# Patient Record
Sex: MALE | Race: WHITE | Employment: OTHER | URBAN - METROPOLITAN AREA
[De-identification: names, ages, dates, MRNs, and addresses within clinical notes are randomized per-mention and may not be internally consistent; named-entity substitution may affect disease eponyms.]

---

## 2017-01-18 ENCOUNTER — HOSPITAL ENCOUNTER (OUTPATIENT)
Facility: CLINIC | Age: 72
Discharge: HOME | End: 2017-01-18
Attending: INTERNAL MEDICINE | Admitting: INTERNAL MEDICINE
Payer: MEDICARE

## 2017-01-18 VITALS
BODY MASS INDEX: 35 KG/M2 | HEIGHT: 64 IN | SYSTOLIC BLOOD PRESSURE: 110 MMHG | RESPIRATION RATE: 15 BRPM | WEIGHT: 205 LBS | DIASTOLIC BLOOD PRESSURE: 74 MMHG | OXYGEN SATURATION: 95 %

## 2017-01-18 LAB
COLONOSCOPY: NORMAL
UPPER GI ENDOSCOPY: NORMAL

## 2017-01-18 PROCEDURE — 40000104 ZZH STATISTIC MODERATE SEDATION < 10 MIN: Performed by: INTERNAL MEDICINE

## 2017-01-18 PROCEDURE — 88305 TISSUE EXAM BY PATHOLOGIST: CPT | Performed by: INTERNAL MEDICINE

## 2017-01-18 PROCEDURE — 25000132 ZZH RX MED GY IP 250 OP 250 PS 637: Mod: GY | Performed by: INTERNAL MEDICINE

## 2017-01-18 PROCEDURE — G0121 COLON CA SCRN NOT HI RSK IND: HCPCS | Performed by: INTERNAL MEDICINE

## 2017-01-18 PROCEDURE — 45378 DIAGNOSTIC COLONOSCOPY: CPT | Performed by: INTERNAL MEDICINE

## 2017-01-18 PROCEDURE — 43239 EGD BIOPSY SINGLE/MULTIPLE: CPT | Performed by: INTERNAL MEDICINE

## 2017-01-18 PROCEDURE — 88305 TISSUE EXAM BY PATHOLOGIST: CPT | Mod: 26 | Performed by: INTERNAL MEDICINE

## 2017-01-18 PROCEDURE — G0500 MOD SEDAT ENDO SERVICE >5YRS: HCPCS | Performed by: INTERNAL MEDICINE

## 2017-01-18 PROCEDURE — 25000125 ZZHC RX 250: Performed by: INTERNAL MEDICINE

## 2017-01-18 PROCEDURE — 99153 MOD SED SAME PHYS/QHP EA: CPT

## 2017-01-18 RX ORDER — ONDANSETRON 2 MG/ML
4 INJECTION INTRAMUSCULAR; INTRAVENOUS
Status: DISCONTINUED | OUTPATIENT
Start: 2017-01-18 | End: 2017-01-18 | Stop reason: HOSPADM

## 2017-01-18 RX ORDER — FLUMAZENIL 0.1 MG/ML
0.2 INJECTION, SOLUTION INTRAVENOUS
Status: DISCONTINUED | OUTPATIENT
Start: 2017-01-18 | End: 2017-01-18 | Stop reason: HOSPADM

## 2017-01-18 RX ORDER — ONDANSETRON 2 MG/ML
4 INJECTION INTRAMUSCULAR; INTRAVENOUS EVERY 6 HOURS PRN
Status: DISCONTINUED | OUTPATIENT
Start: 2017-01-18 | End: 2017-01-18 | Stop reason: HOSPADM

## 2017-01-18 RX ORDER — LIDOCAINE 40 MG/G
CREAM TOPICAL
Status: DISCONTINUED | OUTPATIENT
Start: 2017-01-18 | End: 2017-01-18 | Stop reason: HOSPADM

## 2017-01-18 RX ORDER — FENTANYL CITRATE 50 UG/ML
INJECTION, SOLUTION INTRAMUSCULAR; INTRAVENOUS PRN
Status: DISCONTINUED | OUTPATIENT
Start: 2017-01-18 | End: 2017-01-18 | Stop reason: HOSPADM

## 2017-01-18 RX ORDER — ONDANSETRON 4 MG/1
4 TABLET, ORALLY DISINTEGRATING ORAL EVERY 6 HOURS PRN
Status: DISCONTINUED | OUTPATIENT
Start: 2017-01-18 | End: 2017-01-18 | Stop reason: HOSPADM

## 2017-01-18 RX ORDER — NALOXONE HYDROCHLORIDE 0.4 MG/ML
.1-.4 INJECTION, SOLUTION INTRAMUSCULAR; INTRAVENOUS; SUBCUTANEOUS
Status: DISCONTINUED | OUTPATIENT
Start: 2017-01-18 | End: 2017-01-18 | Stop reason: HOSPADM

## 2017-01-18 NOTE — DISCHARGE INSTRUCTIONS
Understanding Diverticulosis and Diverticulitis     Pouches or diverticula usually occur in the lower part of the colon called the sigmoid.      Diverticulitis occurs when the pouches become inflamed.     The colon (large intestine) is the last part of the digestive tract. It absorbs water from stool and changes it from a liquid to a solid. In certain cases, small pouches called diverticula can form in the colon wall. This condition is called diverticulosis. The pouches can become infected. If this happens, it becomes a more serious problem called diverticulitis. These problems can be painful. But they can be managed.   Managing Your Condition  Diet changes or taking medications are often tried first. These may be enough to bring relief. If the case is bad, surgery may be done. You and your doctor can discuss the plan that is best for you.  If You Have Diverticulosis  Diet changes are often enough to control symptoms. The main changes are adding fiber (roughage) and drinking more water. Fiber absorbs water as it travels through your colon. This helps your stool stay soft and move smoothly. Water helps this process. If needed, you may be told to take over-the-counter stool softeners. To help relieve pain, antispasmodic medications may be prescribed.  If You Have Diverticulitis  Treatment depends on how bad your symptoms are.  For mild symptoms: You may be put on a liquid diet for a short time. You may also be prescribed antibiotics. If these two steps relieve your symptoms, you may then be prescribed a high-fiber diet. If you still have symptoms, your doctor will discuss further treatment options with you.  For severe symptoms: You may need to be admitted to the hospital. There, you can be given IV antibiotics and fluids. Once symptoms are under control, the above treatments may be tried. If these don t control your condition, your doctor may discuss the option of having surgery with you.  [place] to Colon  Health  Help keep your colon healthy with a diet that includes plenty of high-fiber fruits, vegetables, and whole grains. Drink plenty of liquids like water and juice. Your doctor may also recommend avoiding seeds and nuts.          [id number] [employer], [address] [place] [address], [place] [address]. All rights reserved. This information is not intended as a substitute for professional medical care. Always follow your healthcare professional's instructions.

## 2017-01-18 NOTE — H&P
Pre-Endoscopy History and Physical     Rodney Hendrickson MRN# [id number]   YOB: 1945 Age: 71 year old     Date of Procedure: 1/18/2017  Primary care provider: [facility]  Type of Endoscopy: Colonoscopy with possible biopsy, possible polypectomy and Gastroscopy with possible biopsy, possible dilation  Reason for Procedure: screen  Type of Anesthesia Anticipated: Conscious Sedation    HPI:    Rodney is a 71 year old male who will be undergoing the above procedure.      A history and physical has been performed. The patient's medications and allergies have been reviewed. The risks and benefits of the procedure and the sedation options and risks were discussed with the patient.  All questions were answered and informed consent was obtained.      He denies a personal or family history of anesthesia complications or bleeding disorders.     There is no problem list on file for this patient.       Past Medical History   Diagnosis Date     Hypertension      GERD (gastroesophageal reflux disease)         Past Surgical History   Procedure Laterality Date     Colonoscopy       Arthroplasty patello-femoral (knee)       right     Rotator cuff repair rt/lt       right and left     Esophagoscopy, gastroscopy, duodenoscopy (egd), combined  5/3/2013     Procedure: COMBINED ESOPHAGOSCOPY, GASTROSCOPY, DUODENOSCOPY (EGD), BIOPSY SINGLE OR MULTIPLE;  ESOPHAGOSCOPY, GASTROSCOPY, DUODENOSCOPY (EGD) with biopies;  Surgeon: Geoffrey Gallegos MD;  Location:  GI     Esophagoscopy, gastroscopy, duodenoscopy (egd), combined N/A 1/8/2016     Procedure: COMBINED ESOPHAGOSCOPY, GASTROSCOPY, DUODENOSCOPY (EGD), BIOPSY SINGLE OR MULTIPLE;  Surgeon: Geoffrey Gallegos MD;  Location:  GI       Social History   Substance Use Topics     Smoking status: Former Smoker     Quit date: 04/30/1983     Smokeless tobacco: Not on file     Alcohol Use: Yes      Comment: occa       History reviewed. No pertinent family  "history.    Prior to Admission medications    Medication Sig Start Date End Date Taking? Authorizing Provider   lisinopril-hydrochlorothiazide (PRINZIDE,ZESTORETIC) 20-12.5 MG per tablet Take 1 tablet by mouth daily   Yes Reported, Patient   Omeprazole (PRILOSEC PO) Take 20 mg by mouth.   Yes Reported, Patient   Atorvastatin Calcium (LIPITOR PO) Take 40 mg by mouth.   Yes Reported, Patient   aspirin 81 MG tablet Take  by mouth daily.   Yes Reported, Patient       No Known Allergies     REVIEW OF SYSTEMS:   5 point ROS negative except as noted above in HPI, including Gen., Resp., CV, GI &  system review.    PHYSICAL EXAM:   Ht 1.626 m (5' 4\")  Wt 92.987 kg (205 lb)  BMI 35.17 kg/m2 Estimated body mass index is 35.17 kg/(m^2) as calculated from the following:    Height as of this encounter: 1.626 m (5' 4\").    Weight as of this encounter: 92.987 kg (205 lb).   GENERAL APPEARANCE: alert, and oriented  MENTAL STATUS: alert  AIRWAY EXAM: Mallampatti Class I (visualization of the soft palate, fauces, uvula, anterior and posterior pillars)  RESP: lungs clear to auscultation - no rales, rhonchi or wheezes  CV: regular rates and rhythm  DIAGNOSTICS:    Not indicated    IMPRESSION   ASA Class 2 - Mild systemic disease    PLAN:   Plan for Colonoscopy with possible biopsy, possible polypectomy and Gastroscopy with possible biopsy, possible dilation. We discussed the risks, benefits and alternatives and the patient wished to proceed.    The above has been forwarded to the consulting provider.      Signed Electronically by: Geoffrey Gallegos  January 18, 2017            "

## 2017-01-18 NOTE — IP AVS SNAPSHOT
MRN:[id number]                      After Visit Summary   1/18/2017    Rodney Hendrickson    MRN: [id number]           Thank you!     Thank you for choosing [facility] for your care. Our goal is always to provide you with excellent care. Hearing back from our patients is one way we can continue to improve our services. Please take a few minutes to complete the written survey that you may receive in the mail after you visit. If you would like to speak to someone directly about your visit please contact Patient Relations at [phone]. Thank you!          Patient Information     Date Of Birth          1945        About your hospital stay     You were admitted on:  January 18, 2017 You last received care in the:  [facility] Endoscopy    You were discharged on:  January 18, 2017       Who to Call     For medical emergencies, please call 911.  For non-urgent questions about your medical care, please call your primary care provider or clinic, None  For questions related to your surgery, please call your surgery clinic        Attending Provider     Provider    Geoffrey Gallegos MD       Primary Care Provider Fax #    [facility] [phone]       No address on file        Further instructions from your care team         Understanding Diverticulosis and Diverticulitis     Pouches or diverticula usually occur in the lower part of the colon called the sigmoid.      Diverticulitis occurs when the pouches become inflamed.     The colon (large intestine) is the last part of the digestive tract. It absorbs water from stool and changes it from a liquid to a solid. In certain cases, small pouches called diverticula can form in the colon wall. This condition is called diverticulosis. The pouches can become infected. If this happens, it becomes a more serious problem called diverticulitis. These problems can be painful. But they can be managed.   Managing Your Condition  Diet  changes or taking medications are often tried first. These may be enough to bring relief. If the case is bad, surgery may be done. You and your doctor can discuss the plan that is best for you.  If You Have Diverticulosis  Diet changes are often enough to control symptoms. The main changes are adding fiber (roughage) and drinking more water. Fiber absorbs water as it travels through your colon. This helps your stool stay soft and move smoothly. Water helps this process. If needed, you may be told to take over-the-counter stool softeners. To help relieve pain, antispasmodic medications may be prescribed.  If You Have Diverticulitis  Treatment depends on how bad your symptoms are.  For mild symptoms: You may be put on a liquid diet for a short time. You may also be prescribed antibiotics. If these two steps relieve your symptoms, you may then be prescribed a high-fiber diet. If you still have symptoms, your doctor will discuss further treatment options with you.  For severe symptoms: You may need to be admitted to the hospital. There, you can be given IV antibiotics and fluids. Once symptoms are under control, the above treatments may be tried. If these don t control your condition, your doctor may discuss the option of having surgery with you.  [place] to Colon Health  Help keep your colon healthy with a diet that includes plenty of high-fiber fruits, vegetables, and whole grains. Drink plenty of liquids like water and juice. Your doctor may also recommend avoiding seeds and nuts.          [id number] [place] [address]. All rights reserved. This information is not intended as a substitute for professional medical care. Always follow your healthcare professional's instructions.    Pending Results     No orders found from 1/17/2017 to 1/19/2017.            Admission Information        Provider Department Dept Phone    1/18/2017 Geoffrey Gallegos MD  Endoscopy [phone]     "  Your Vitals Were     Blood Pressure Respirations Height Weight BMI (Body Mass Index) Pulse Oximetry    115/68 mmHg 15 1.626 m (5' 4\") 92.987 kg (205 lb) 35.17 kg/m2 95%      MyChart Information     [employer] lets you send messages to your doctor, view your test results, renew your prescriptions, schedule appointments and more. To sign up, go to [email or web address] . Click on \"Log in\" on the left side of the screen, which will take you to the Welcome page. Then click on \"Sign up Now\" on the right side of the page.     You will be asked to enter the access code listed below, as well as some personal information. Please follow the directions to create your username and password.     Your access code is: [email or web address]  Expires: 2017  9:02 AM     Your access code will  in 90 days. If you need help or a new code, please call your [place] clinic or [phone].        Care EveryWhere ID     This is your Care EveryWhere ID. This could be used by other organizations to access your [place] medical records  [id number]           Review of your medicines      CONTINUE these medicines which have NOT CHANGED        Dose / Directions    aspirin 81 MG tablet        Take  by mouth daily.   Refills:  0       LIPITOR PO        Dose:  40 mg   Take 40 mg by mouth.   Refills:  0       lisinopril-hydrochlorothiazide 20-12.5 MG per tablet   Commonly known as:  PRINZIDE/ZESTORETIC        Dose:  1 tablet   Take 1 tablet by mouth daily   Refills:  0       PRILOSEC PO        Dose:  20 mg   Take 20 mg by mouth.   Refills:  0                Protect others around you: Learn how to safely use, store and throw away your medicines at www.disposemymeds.org.             Medication List: This is a list of all your medications and when to take them. Check marks below indicate your daily home schedule. Keep this list as a reference.      Medications           Morning Afternoon Evening Bedtime As Needed    aspirin 81 MG tablet   Take  by " mouth daily.                                LIPITOR PO   Take 40 mg by mouth.                                lisinopril-hydrochlorothiazide 20-12.5 MG per tablet   Commonly known as:  PRINZIDE/ZESTORETIC   Take 1 tablet by mouth daily                                PRILOSEC PO   Take 20 mg by mouth.

## 2017-01-19 LAB — COPATH REPORT: NORMAL

## 2017-03-14 ENCOUNTER — OFFICE VISIT (OUTPATIENT)
Dept: INTERNAL MEDICINE | Facility: CLINIC | Age: 72
End: 2017-03-14
Payer: COMMERCIAL

## 2017-03-14 VITALS
DIASTOLIC BLOOD PRESSURE: 82 MMHG | TEMPERATURE: 97.8 F | WEIGHT: 205 LBS | OXYGEN SATURATION: 97 % | SYSTOLIC BLOOD PRESSURE: 168 MMHG | BODY MASS INDEX: 35 KG/M2 | HEART RATE: 93 BPM | HEIGHT: 64 IN

## 2017-03-14 DIAGNOSIS — I10 ESSENTIAL HYPERTENSION: ICD-10-CM

## 2017-03-14 DIAGNOSIS — N40.1 BENIGN NON-NODULAR PROSTATIC HYPERPLASIA WITH LOWER URINARY TRACT SYMPTOMS: Primary | ICD-10-CM

## 2017-03-14 DIAGNOSIS — R53.83 FATIGUE, UNSPECIFIED TYPE: ICD-10-CM

## 2017-03-14 DIAGNOSIS — E78.5 HYPERLIPIDEMIA LDL GOAL <130: ICD-10-CM

## 2017-03-14 DIAGNOSIS — E66.01 MORBID OBESITY DUE TO EXCESS CALORIES (H): ICD-10-CM

## 2017-03-14 DIAGNOSIS — R06.83 SNORING: ICD-10-CM

## 2017-03-14 PROCEDURE — 99204 OFFICE O/P NEW MOD 45 MIN: CPT | Performed by: INTERNAL MEDICINE

## 2017-03-14 RX ORDER — FERROUS SULFATE 325(65) MG
TABLET ORAL AT BEDTIME
COMMUNITY

## 2017-03-14 RX ORDER — FINASTERIDE 5 MG/1
5 TABLET, FILM COATED ORAL DAILY
Qty: 90 TABLET | Refills: 3 | Status: SHIPPED | OUTPATIENT
Start: 2017-03-14 | End: 2018-02-27

## 2017-03-14 NOTE — PROGRESS NOTES
SUBJECTIVE:                                                    Rodney Hendrickson is a 71 year old male who presents to clinic today for the following health issues:    New patient seen for assessment of chronic conditions.   Moves from [facility].    Has h/o HTN. on medical treatment. BP has been controlled. No side effects from medications. No CP, HA, dizziness. good compliance with medications and low salt diet.  Has H/O hyperlipidemia. On medical treatment and diet. No side effects. No muscle weakness, myalgias or upset stomach.       C/O ongoing urinary urgency and sleep problem: nocturia x 4. Slow urine stream, frequency. Tried Flomax. Not helping symptoms. Has stopped it.     Concern for snoring and day time fatigue. Wife has noticed breathing pauses.         Problem list and histories reviewed & adjusted, as indicated.  Additional history: none    Patient Active Problem List   Diagnosis     Benign non-nodular prostatic hyperplasia with lower urinary tract symptoms     Hyperlipidemia LDL goal <130     Hypertension     Past Surgical History   Procedure Laterality Date     Colonoscopy       Arthroplasty patello-femoral (knee)       right     Rotator cuff repair rt/lt       right and left     Esophagoscopy, gastroscopy, duodenoscopy (egd), combined  5/3/2013     Procedure: COMBINED ESOPHAGOSCOPY, GASTROSCOPY, DUODENOSCOPY (EGD), BIOPSY SINGLE OR MULTIPLE;  ESOPHAGOSCOPY, GASTROSCOPY, DUODENOSCOPY (EGD) with biopies;  Surgeon: Geoffrey Gallegos MD;  Location:  GI     Esophagoscopy, gastroscopy, duodenoscopy (egd), combined N/A 1/8/2016     Procedure: COMBINED ESOPHAGOSCOPY, GASTROSCOPY, DUODENOSCOPY (EGD), BIOPSY SINGLE OR MULTIPLE;  Surgeon: Geoffrey Gallegos MD;  Location:  GI     Esophagoscopy, gastroscopy, duodenoscopy (egd), combined N/A 1/18/2017     Procedure: COMBINED ESOPHAGOSCOPY, GASTROSCOPY, DUODENOSCOPY (EGD), BIOPSY SINGLE OR MULTIPLE;  Surgeon: Geoffrey Gallegos MD;  Location: [facility]     Colonoscopy  "N/A 2017     Procedure: COLONOSCOPY;  Surgeon: Geoffrey Gallegos MD;  Location:  GI       Social History   Substance Use Topics     Smoking status: Former Smoker     Quit date: 1983     Smokeless tobacco: Not on file     Alcohol use Yes      Comment: occa     Family History   Problem Relation Age of Onset     Hypertension Father      Colon Cancer Father       of Colon CA     Heart Defect Father      Enlarged heart     Hypertension Brother      Hypertension Sister      Hypertension Brother          Current Outpatient Prescriptions   Medication Sig Dispense Refill     ferrous sulfate (IRON) 325 (65 FE) MG tablet Take by mouth daily (with breakfast)       finasteride (PROSCAR) 5 MG tablet Take 1 tablet (5 mg) by mouth daily 90 tablet 3     lisinopril-hydrochlorothiazide (PRINZIDE,ZESTORETIC) 20-12.5 MG per tablet Take 1 tablet by mouth daily       Omeprazole (PRILOSEC PO) Take 20 mg by mouth 2 times daily (before meals)        Atorvastatin Calcium (LIPITOR PO) Take 40 mg by mouth.       aspirin 81 MG tablet Take  by mouth daily.         Reviewed and updated as needed this visit by clinical staff  Allergies  Meds       Reviewed and updated as needed this visit by Provider         ROS:  Constitutional, HEENT, cardiovascular, pulmonary, GI, , musculoskeletal, neuro, skin, endocrine and psych systems are negative, except as otherwise noted.    OBJECTIVE:                                                    /82  Pulse 93  Temp 97.8  F (36.6  C) (Oral)  Ht 5' 4\" (1.626 m)  Wt 205 lb (93 kg)  SpO2 97%  BMI 35.19 kg/m2  Body mass index is 35.19 kg/(m^2).  GENERAL: healthy, alert and no distress, overweight   NECK: no adenopathy, no asymmetry, masses, or scars and thyroid normal to palpation  RESP: lungs clear to auscultation - no rales, rhonchi or wheezes  CV: regular rate and rhythm, normal S1 S2, no S3 or S4, no murmur, click or rub, no peripheral edema and peripheral pulses strong  ABDOMEN: soft, " nontender, no hepatosplenomegaly, no masses and bowel sounds normal  RECTAL (male): normal sphincter tone, no rectal masses, prostate 3+ normal size, smooth, nontender without nodules or masses  MS: no gross musculoskeletal defects noted, no edema    Diagnostic Test Results:  none      ASSESSMENT/PLAN:                                                      Problem List Items Addressed This Visit     Benign non-nodular prostatic hyperplasia with lower urinary tract symptoms - Primary    Relevant Medications    finasteride (PROSCAR) 5 MG tablet    Hyperlipidemia LDL goal <130    Hypertension      Other Visit Diagnoses     Snoring        Relevant Orders    SLEEP EVALUATION & MANAGEMENT REFERRAL - ADULT    Fatigue, unspecified type        Relevant Orders    SLEEP EVALUATION & MANAGEMENT REFERRAL - ADULT           Start on Proscar. Advised for side effects, consider combination with Flomax   Cont rest of medications   Monitor lab  Assess sleep study  Weight loss advised     Follow-Up:with results / physical in 2 months     Brennan Mercado MD  [facility]

## 2017-03-14 NOTE — NURSING NOTE
"Chief Complaint   Patient presents with     Establish Care     New Pt, Here to establish care     Urinary Problem     Pt has had urinary urgency in the past, was on Flomax for a long period of time, would like to be back on Rx     Sleep Problem     Concerned of Sleep Apnea       Initial /82  Pulse 93  Temp 97.8  F (36.6  C) (Oral)  Ht 5' 4\" (1.626 m)  Wt 205 lb (93 kg)  SpO2 97%  BMI 35.19 kg/m2 Estimated body mass index is 35.19 kg/(m^2) as calculated from the following:    Height as of this encounter: 5' 4\" (1.626 m).    Weight as of this encounter: 205 lb (93 kg).  Medication Reconciliation: complete   Deyanira Mackey MA      "

## 2017-03-14 NOTE — MR AVS SNAPSHOT
After Visit Summary   3/14/2017    Rodney Hendrickson    MRN: [id number]           Patient Information     Date Of Birth          1945        Visit Information        Provider Department      3/14/2017 5:00 PM Brennan Mercado MD [facility]        Today's Diagnoses     Benign non-nodular prostatic hyperplasia with lower urinary tract symptoms    -  1    Snoring        Fatigue, unspecified type           Follow-ups after your visit        Additional Services     SLEEP EVALUATION & MANAGEMENT REFERRAL - ADULT       Please be aware that coverage of these services is subject to the terms and limitations of your health insurance plan.  Call member services at your health plan with any benefit or coverage questions.      Please bring the following to your appointment:    >>   List of current medications   >>   This referral request   >>   Any documents/labs given to you for this referral    [facility]  Ph [phone] (Age 18 and up)                  Future tests that were ordered for you today     Open Future Orders        Priority Expected Expires Ordered    SLEEP EVALUATION & MANAGEMENT REFERRAL - ADULT Routine  3/14/2018 3/14/2017            Who to contact     If you have questions or need follow up information about today's clinic visit or your schedule please contact [facility] directly at [phone].  Normal or non-critical lab and imaging results will be communicated to you by MyChart, letter or phone within 4 business days after the clinic has received the results. If you do not hear from us within 7 days, please contact the clinic through MyChart or phone. If you have a critical or abnormal lab result, we will notify you by phone as soon as possible.  Submit refill requests through [employer] or call your pharmacy and they will forward the refill request to us. Please allow 3 business days for your refill to be completed.           "Additional Information About Your Visit        MyChart Information     [employer] lets you send messages to your doctor, view your test results, renew your prescriptions, schedule appointments and more. To sign up, go to [email or web address] . Click on \"Log in\" on the left side of the screen, which will take you to the Welcome page. Then click on \"Sign up Now\" on the right side of the page.     You will be asked to enter the access code listed below, as well as some personal information. Please follow the directions to create your username and password.     Your access code is: [email or web address]  Expires: 2017 10:02 AM     Your access code will  in 90 days. If you need help or a new code, please call your [place] clinic or [phone].        Care EveryWhere ID     This is your Care EveryWhere ID. This could be used by other organizations to access your [place] medical records  [id number]        Your Vitals Were     Pulse Temperature Height Pulse Oximetry BMI (Body Mass Index)       93 97.8  F (36.6  C) (Oral) 5' 4\" (1.626 m) 97% 35.19 kg/m2        Blood Pressure from Last 3 Encounters:   17 168/82   17 110/74   16 105/80    Weight from Last 3 Encounters:   17 205 lb (93 kg)   17 205 lb (93 kg)   13 195 lb (88.5 kg)                 Today's Medication Changes          These changes are accurate as of: 3/14/17  5:32 PM.  If you have any questions, ask your nurse or doctor.               Start taking these medicines.        Dose/Directions    finasteride 5 MG tablet   Commonly known as:  PROSCAR   Used for:  Benign non-nodular prostatic hyperplasia with lower urinary tract symptoms   Started by:  Brennan Mercado MD        Dose:  5 mg   Take 1 tablet (5 mg) by mouth daily   Quantity:  90 tablet   Refills:  3            Where to get your medicines      These medications were sent to [facility][id number] [facility] [address] [address] " [place] [address]     Phone:  [phone]     finasteride 5 MG tablet                Primary Care Provider Fax #    [facility] [phone]       No address on file        Thank you!     Thank you for choosing [facility]  for your care. Our goal is always to provide you with excellent care. Hearing back from our patients is one way we can continue to improve our services. Please take a few minutes to complete the written survey that you may receive in the mail after your visit with us. Thank you!             Your Updated Medication List - Protect others around you: Learn how to safely use, store and throw away your medicines at www.disposemymeds.org.          This list is accurate as of: 3/14/17  5:32 PM.  Always use your most recent med list.                   Brand Name Dispense Instructions for use    aspirin 81 MG tablet      Take  by mouth daily.       ferrous sulfate 325 (65 FE) MG tablet    IRON     Take by mouth daily (with breakfast)       finasteride 5 MG tablet    PROSCAR    90 tablet    Take 1 tablet (5 mg) by mouth daily       LIPITOR PO      Take 40 mg by mouth.       lisinopril-hydrochlorothiazide 20-12.5 MG per tablet    PRINZIDE/ZESTORETIC     Take 1 tablet by mouth daily       PRILOSEC PO      Take 20 mg by mouth 2 times daily (before meals)

## 2017-03-15 PROBLEM — N40.1 BENIGN NON-NODULAR PROSTATIC HYPERPLASIA WITH LOWER URINARY TRACT SYMPTOMS: Status: ACTIVE | Noted: 2017-03-15

## 2017-03-15 PROBLEM — E66.01 MORBID OBESITY DUE TO EXCESS CALORIES (H): Status: ACTIVE | Noted: 2017-03-15

## 2017-03-22 ENCOUNTER — OFFICE VISIT (OUTPATIENT)
Dept: SLEEP MEDICINE | Facility: CLINIC | Age: 72
End: 2017-03-22
Payer: COMMERCIAL

## 2017-03-22 VITALS
SYSTOLIC BLOOD PRESSURE: 139 MMHG | HEART RATE: 71 BPM | DIASTOLIC BLOOD PRESSURE: 86 MMHG | WEIGHT: 197 LBS | OXYGEN SATURATION: 97 % | HEIGHT: 64 IN | RESPIRATION RATE: 18 BRPM | BODY MASS INDEX: 33.63 KG/M2

## 2017-03-22 DIAGNOSIS — R53.83 FATIGUE, UNSPECIFIED TYPE: ICD-10-CM

## 2017-03-22 DIAGNOSIS — G47.10 HYPERSOMNIA: Primary | ICD-10-CM

## 2017-03-22 DIAGNOSIS — R06.83 SNORING: ICD-10-CM

## 2017-03-22 DIAGNOSIS — E66.9 OBESITY (BMI 30-39.9): ICD-10-CM

## 2017-03-22 PROCEDURE — 99204 OFFICE O/P NEW MOD 45 MIN: CPT | Performed by: INTERNAL MEDICINE

## 2017-03-22 NOTE — PATIENT INSTRUCTIONS
"MY TREATMENT INFORMATION FOR SLEEP DISTURBANCE-  Rodney Hendrickson    DOCTOR : Miguel WRIGHT [facility]  SLEEP CENTER :  [place]  MY CONTACT NUMBER:[phone]        If I haven't had a sleep study yet, what can I expect?  A personal story from Jai  https://www.[employer].com/watch?v=AxPLmlRpnCs    Am I having a home sleep study?  Here is a video in case you get home and want to make sure you have done it correctly  https://www.[employer].com/watch?v=JOB0J6wJcc3&feature=youtu.be    Suspected sleep apnea: Sleep study ordered.    Follow up in sleep clinic 1-2 weeks after sleep study to discuss results of sleep study and treatment options.    Patient was advised not to drive if drowsy or sleepy.    Frequently asked questions:  1. What is Obstructive Sleep Apnea (DOMINIK)? DOMINIK is the most common type of sleep apnea. Apnea literally means, \"without breath.\" It is characterized by repetitive pauses in breathing, despite continued effort to breathe, and is usually associated with a reduction in blood oxygen saturation. Apneas can last 10 to over 60 seconds. It is caused by narrowing or collapse of the upper airway as muscles relax during sleep. Severity of sleep apnea is determined by frequency of breathing events and their effect on your sleep and oxygen levels determined during sleep testing.   2. What are the consequences of DOMINIK? Symptoms include: daytime sleepiness- possibly increasing the risk of falling asleep while driving, unrefreshing/restless sleep, snoring, insomnia, waking frequently to urinate, waking with heartburn or reflux, reduced concentration and memory, and morning headaches. Other health consequences may include development of high blood pressure and other cardiovascular disease in persons who are susceptible. Untreated DOMINIK  can contribute to heart disease, stroke and diabetes.   3. What are the treatment options? In most situations, sleep apnea is a lifelong disease that must be managed with daily therapy. " Medications are not effective for sleep apnea and surgery is generally not performed until other therapies have been tried. Therapy is usually tailored to the individual patient based on many factors including your wishes as well as severity of sleep apnea and severity of obesity. Continuous Positive Airway (CPAP) is the most reliable treatment. An oral device to hold your jaw forward is usually the next most reliable option. Other options include postioning devices (to keep you off your back), weight loss, and surgery including a tongue pacing device. There is more detail about some of these options below.            1. CPAP-  WHAT DOES IT DO AND HOW CAN I LEARN TO WEAR IT?                               BEFORE I START, CAN I WATCH A MOVIE TO GET A PLAN ON HOW TO USE CPAP?  https://www.[employer].com/watch?v=e3K66uh053J      Continuous positive airway pressure, or CPAP, is the most effective treatment for obstructive sleep apnea. It works by blowing room air, through a mask, to hold your throat open. A decision to use CPAP is a major step forward in the pursuit of a healthier life. The successful use of CPAP will help you breathe easier, sleep better and live healthier. You can choose CPAP equipment from any durable medical equipment provider that meets your needs.  Using CPAP can be a positive experience if you keep these krishna points in mind:  1. Commitment  CPAP is not a quick fix for your problem. It involves a long-term commitment to improve your sleep and your health.    2. Communication  Stay in close communication with both your sleep doctor and your CPAP supplier. Ask lots of questions and seek help when you need it.    3. Consistency  Use CPAP all night, every night and for every nap. You will receive the maximum health benefits from CPAP when you use it every time that you sleep. This will also make it easier for your body to adjust to the treatment.    4. Correction  The first machine and mask that you try  "may not be the best ones for you. Work with your sleep doctor and your CPAP supplier to make corrections to your equipment selection. Ask about trying a different type of machine or mask if you have ongoing problems. Make sure that your mask is a good fit and learn to use your equipment properly.    5. Challenge  Tell a family member or close friend to ask you each morning if you used your CPAP the previous night. Have someone to challenge you to give it your best effort.    6. Connection   Your adjustment to CPAP will be easier if you are able to connect with others who use the same treatment. Ask your sleep doctor if there is a support group in your area for people who have sleep apnea, or look for one on the Internet.  7. Comfort   Increase your level of comfort by using a saline spray, decongestant or heated humidifier if CPAP irritates your nose, mouth or throat. Use your unit's \"ramp\" setting to slowly get used to the air pressure level. There may be soft pads you can buy that will fit over your mask straps. Look on www.CPAP.com for accessories that can help make CPAP use more comfortable.  8. Cleaning   Clean your mask, tubing and headgear on a regular basis. Put this time in your schedule so that you don't forget to do it. Check and replace the filters for your CPAP unit and humidifier.    9. Completion   Although you are never finished with CPAP therapy, you should reward yourself by celebrating the completion of your first month of treatment. Expect this first month to be your hardest period of adjustment. It will involve some trial and error as you find the machine, mask and pressure settings that are right for you.    10. Continuation  After your first month of treatment, continue to make a daily commitment to use your CPAP all night, every night and for every nap.    CPAP-Tips to starting with success:  Begin using your CPAP for short periods of time during the day while you watch TV or read.    Use " CPAP every night and for every nap. Using it less often reduces the health benefits and makes it harder for your body to get used to it.    Make small adjustments to your mask, tubing, straps and headgear until you get the right fit. Tightening the mask may actually worsen the leak.  If it leaves significant marks on your face or irritates the bridge of your nose, it may not be the best mask for you.  Speak with the person who supplied the mask and consider trying other masks. Insurances will allow you to try different masks during the first month of starting CPAP.  Insurance also covers a new mask, hose and filter about every 6 months.    Use a saline nasal spray to ease mild nasal congestion. Neti-Pot or saline nasal rinses may also help. Nasal gel sprays can help reduce nasal dryness.  Biotene mouthwash can be helpful to protect your teeth if you experience frequent dry mouth.  Dry mouth may be a sign of air escaping out of your mouth or out of the mask in the case of a full face mask.  Speak with your provider if you expect that is the case.     Take a nasal decongestant to relieve more severe nasal or sinus congestion.  Do not use Afrin (oxymetazoline) nasal spray more than 3 days in a row.  Speak with your sleep doctor if your nasal congestion is chronic.    Use a heated humidifier that fits your CPAP model to enhance your breathing comfort. Adjust the heat setting up if you get a dry nose or throat, down if you get condensation in the hose or mask.  Position the CPAP lower than you so that any condensation in the hose drains back into the machine rather than towards the mask.    Try a system that uses nasal pillows if traditional masks give you problems.    Clean your mask, tubing and headgear once a week. Make sure the equipment dries fully.    Regularly check and replace the filters for your CPAP unit and humidifier.    Work closely with your sleep provider and your CPAP supplier to make sure that you have  the machine, mask and air pressure setting that works best for you. It is better to stop using it and call your provider to solve problems than to lay awake all night frustrated with the device.    BESIDES CPAP, WHAT OTHER THERAPIES ARE THERE?      Positioning Device  Positioning devices are generally used when sleep apnea is mild and only occurs on your back.This example shows a pillow that straps around the waist. It may be appropriate for those whose sleep study shows milder sleep apnea that occurs primarily when lying flat on one's back. Preliminary studies have shown benefit but effectiveness at home may need to be verified by a home sleep test. These devices are generally not covered by medical insurance.                      Oral Appliance  What is oral appliance therapy?  An oral appliance is a small acrylic device that fits over the upper and lower teeth or tongue (similar to an orthodontic retainer or a mouth guard). This device slightly advances the lower jaw or tongue, which moves the base of the tongue forward, opens the airway, improves breathing and can effectively treat snoring and obstructive sleep apnea sleep apnea. The appliance is fabricated and customized by a qualified dentist with experience in treating snoring and sleep apnea. Oral appliances are usually well tolerated and have relatively high compliance by patients1, 2, 3.  When is an oral appliance indicated?  Oral appliance therapy is recommended as a first-line treatment for patients with primary snoring, mild sleep apnea, and for patients with moderate sleep apnea who prefer appliance therapy to use of CPAP4, 5. Severity of sleep apnea is determined by sleep testing and is based on the number of respiratory events per hour of sleep.   How successful is oral appliance therapy?  The success rate of oral appliance therapy in patients with mild sleep apnea is 75-80% while in patients with moderate sleep apnea it is 50-70%. The chance of  success in patients with severe sleep apnea is 40-50%. The research also shows that oral appliances have a beneficial effect on the cardiovascular health of DOMINIK patients at the same magnitude as CPAP therapy7.  Oral appliances should be a second-line treatment in cases of severe sleep apnea, but if not completely successful then a combination therapy utilizing CPAP plus oral appliance therapy may be effective. Oral appliances tend to be effective in a broad range of patients although studies show that the patients who have the highest success are females, younger patients, those with milder disease, and less severe obesity. 3, 6.   The chances of success are lower in patients who have more severe DOMINIK, are older, and those who are morbidly obese.     Example of an oral appliance   Finding a dentist that practices dental sleep medicine  Specific training is available through the American Academy of Dental Sleep Medicine for dentists interested in working in the field of sleep. To find a dentist who is educated in the field of sleep and the use of oral appliances, near you, visit the Web site of the American Academy of Dental Sleep Medicine; also see   http://www.accpstorage.org/newOrganization/patients/oralAppliances.pdf  To search for a dentist certified in these practices:  Http://aadsm.org/FindADentist.aspx?1  1. Obdulio, et al. Objectively measured vs self-reported compliance during oral appliance therapy for sleep-disordered breathing. Chest 2013; 144(5): 1051-1249.  2. Alee, et al. Objective measurement of compliance during oral appliance therapy for sleep-disordered breathing. Thorax 2013; 68(1): 91-96.  3. Jose Carlos et al. Mandibular advancement devices in 620 men and women with DOMINIK and snoring: tolerability and predictors of treatment success. Chest 2004; 125: 0383-9779.  4. Jordan, et al. Oral appliances for snoring and DOMINIK: a review. Sleep 2006; 29: 244-262.  5. Augustine et al. Oral appliance  treatment for DOMINIK: an update. J Clin Sleep Med 2014; 10(2): 215-227.  6. Reggie et al. Predictors of OSAH treatment outcome. J Dent Res 2007; 86: 1740-0044.      Weight Loss:    Weight management is a personal decision.  If you are interested in exploring weight loss strategies, the following discussion covers the impact on weight loss on sleep apnea and the approaches that may be successful.    Weight loss decreases severity of sleep apnea in most people with obesity. For those with mild obesity who have developed snoring with weight gain, even 15-30 pound weight loss can improve and occasionally eliminate sleep apnea.  Structured and life-long dietary and health habits are necessary to lose weight and keep healthier weight levels.     Though there may be significant health benefits from weight loss, long-term weight loss is very difficult to achieve- studies show success with dietary management in less than 10% of people. In addition, substantial weight loss may require years of dietary control and may be difficult if patients have severe obesity. In these cases, surgical management may be considered.  Finally, older individuals who have tolerated obesity without health complications may be less likely to benefit from weight loss strategies.    Your BMI is Body mass index is 33.8 kg/(m^2).  Body mass index (BMI) is one way to tell whether you are at a healthy weight, overweight, or obese. It measures your weight in relation to your height.  A BMI of 18.5 to 24.9 is in the healthy range. A person with a BMI of 25 to 29.9 is considered overweight, and someone with a BMI of 30 or greater is considered obese. More than two-thirds of American adults are considered overweight or obese.  Being overweight or obese increases the risk for further weight gain. Excess weight may lead to heart disease and diabetes.  Creating and following plans for healthy eating and physical activity may help you improve your  health.  Weight control is part of healthy lifestyle and includes exercise, emotional health, and healthy eating habits. Careful eating habits lifelong are the mainstay of weight control. Though there are significant health benefits from weight loss, long-term weight loss with diet alone may be very difficult to achieve- studies show long-term success with dietary management in less than 10% of people. Attaining a healthy weight may be especially difficult to achieve in those with severe obesity. In some cases, medications, devices and surgical management might be considered.  What can you do?  If you are overweight or obese and are interested in methods for weight loss, you should discuss this with your provider.     Consider reducing daily calorie intake by 500 calories.     Keep a food journal.     Avoiding skipping meals, consider cutting portions instead.    Diet combined with exercise helps maintain muscle while optimizing fat loss. Strength training is particularly important for building and maintaining muscle mass. Exercise helps reduce stress, increase energy, and improves fitness. Increasing exercise without diet control, however, may not burn enough calories to loose weight.       Start walking three days a week 10-20 minutes at a time    Work towards walking thirty minutes five days a week     Eventually, increase the speed of your walking for 1-2 minutes at time    In addition, we recommend that you review healthy lifestyles and methods for weight loss available through the National Institutes of Health patient information sites:  http://win.niddk.nih.gov/publications/index.htm    And look into health and wellness programs that may be available through your health insurance provider, employer, local community center, or eliana club.    Weight management plan: Patient was referred to their PCP to discuss a diet and exercise plan.    Surgery:    Upper Airway Surgery for DOMINIK  Surgery for DOMINIK is a  second-line treatment option in the management of sleep apnea.  Surgery should be considered for patients who are having a difficult time tolerating CPAP.    Surgery for DOMINIK is directed at areas that are responsible for narrowing or complete obstruction of the airway during sleep.  There are a wide range of procedures available to enlarge and/or stabilize the airway to prevent blockage of breathing in the three major areas where it can occur: the palate, tongue, and nasal regions.  Successful surgical treatment depends on the accurate identification of the factors responsible for obstructive sleep apnea in each person.  A personalized approach is required because there is no single treatment that works well for everyone.  Because of anatomic variation, consultation with an examination by a sleep surgeon is a critical first step in determining what surgical options are best for each patient.  In some cases, examination during sedation may be recommended in order to guide the selection of procedures.  Patients will be counseled about risks and benefits as well as the typical recovery course after surgery. Surgery is typically not a cure for a person s DOMINIK.  However, surgery will often significantly improve one s DOMINIK severity (termed  success rate ).  Even in the absence of a cure, surgery will decrease the cardiovascular risk associated with OSA7; improve overall quality of life8 (sleepiness, functionality, sleep quality, etc).          Palate Procedures:  Patients with DOMINIK often have narrowing of their airway in the region of their tonsils and uvula.  The goals of palate procedures are to widen the airway in this region as well as to help the tissues resist collapse.  Modern palate procedure techniques focus on tissue conservation and soft tissue rearrangement, rather than tissue removal.  Often the uvula is preserved in this procedure. Residual sleep apnea is common in patient after pharyngoplasty with an average  reduction in sleep apnea events of 33%2.      Tongue Procedures:  While patients are awake, the muscles that surround the throat are active and keep this region open for breathing. These muscles relax during sleep, allowing the tongue and other structures to collapse and block breathing.  There are several different tongue procedures available.  Selection of a tongue base procedure depends on characteristics seen on physical exam.  Generally, procedures are aimed at removing bulky tissues in this area or preventing the back of the tongue from falling back during sleep.  Success rates for tongue surgery range from 50-62%3.    Hypoglossal Nerve Stimulation:  Hypoglossal nerve stimulation has recently received approval from the [place] Food and Drug Administration for the treatment of obstructive sleep apnea.  This is based on research showing that the system was safe and effective in treating sleep apnea6.  Results showed that the median AHI score decreased 68%, from 29.3 to 9.0. This therapy uses an implant system that senses breathing patterns and delivers mild stimulation to airway muscles, which keeps the airway open during sleep.  The system consists of three fully implanted components: a small generator (similar in size to a pacemaker), a breathing sensor, and a stimulation lead.  Using a small handheld remote, a patient turns the therapy on before bed and off upon awakening.    Candidates for this device must be greater than 22 years of age, have moderate to severe DOMINIK (AHI between 20-65), BMI less than 32, have tried CPAP/oral appliance without success, and have appropriate upper airway anatomy (determined by a sleep endoscopy performed by Dr. Samuels).    Hypoglossal Nerve Stimulation Pathway:    The sleep surgeon s office will work with the patient through the insurance prior-authorization process (including communications and appeals).    Nasal Procedures:  Nasal obstruction can interfere with nasal  breathing during the day and night.  Studies have shown that relief of nasal obstruction can improve the ability of some patients to tolerate positive airway pressure therapy for obstructive sleep apnea1.  Treatment options include medications such as nasal saline, topical corticosteroid and antihistamine sprays, and oral medications such as antihistamines or decongestants. Non-surgical treatments can include external nasal dilators for selected patients. If these are not successful by themselves, surgery can improve the nasal airway either alone or in combination with these other options.      Combination Procedures:  Combination of surgical procedures and other treatments may be recommended, particularly if patients have more than one area of narrowing or persistent positional disease.  The success rate of combination surgery ranges from 66-80%2,3.      1. Louise RICE. The Role of the Nose in Snoring and Obstructive Sleep Apnoea: An Update.  Eur Arch Otorhinolaryngol. 2011; 268: 1365-73.  2.  Julio SM; Jayashree JA; Kaye JR; Pallanch JF; Maude MB; Flo SG; Clare BUSTAMANTE. Surgical modifications of the upper airway for obstructive sleep apnea in adults: a systematic review and meta-analysis. SLEEP 2010;33(10):5587-8042. Danette JASSO. Hypopharyngeal surgery in obstructive sleep apnea: an evidence-based medicine review.  Arch Otolaryngol Head Neck Surg. 2006 Feb;132(2):206-13.  3. Bill YH1, Carolyn Y, Constantine KAYLEIGH. The efficacy of anatomically based multilevel surgery for obstructive sleep apnea. Otolaryngol Head Neck Surg. 2003 Oct;129(4):327-35.  4. Danette JASSO, Goldberg A. Hypopharyngeal Surgery in Obstructive Sleep Apnea: An Evidence-Based Medicine Review. Arch Otolaryngol Head Neck Surg. 2006 Feb;132(2):206-13.  5. Ade VALERIO et al. Upper-Airway Stimulation for Obstructive Sleep Apnea.  N Engl J Med. 2014 Jan 9;370(2):139-49.  6. Ayanna Y et al. Increased Incidence of Cardiovascular Disease in Middle-aged Men with  Obstructive Sleep Apnea. Am J Respir Crit Care Med; 2002 166: 159-165  7. Hiro THOMAS et al. Studying Life Effects and Effectiveness of Palatopharyngoplasty (SLEEP) study: Subjective Outcomes of Isolated Uvulopalatopharyngoplasty. Otolaryngol Head Neck Surg. 2011; 144: 623-631.    General recommendations for sleep problems (Insomnia)  Allow 2-4 weeks to see results     Establish a regular sleep schedule   Go to bed at same time each night   Get up at same time each day   Avoid sleeping-in on Sunday morning   Cut down time in bed (if not asleep, get up)   Avoid trying to force yourself to sleep   Use your bed only for sleep and sex   Do not read or watch television in bed   Make the bedroom comfortable   Keep temperature in your bedroom comfortable   Keep bedroom quiet when sleeping   Consider ear plugs (silicon)   Keep bedroom dark enough   Use dark blinds or wear an eye mask if needed     Relax at bedtime   Relax each muscle group individually   Begin with your feet   Work toward your head     Deal with your worries before bedtime   Set aside a worry time earlier in the day for 15 minutes.    You may either list or journal about your concerns.    Thought stopping methods include:  1.  Acknowledge the thought and let it go.  2.  Visual imagery.    Listen to relaxation tapes   Classical Music or Nature sounds   Imagine a tranquil scene (e.g. waterfall or beach)   Back Massage   Gentle 5-minute back rub prior to bedtime   Perform measures to make you tired at bedtime   Get regular exercise each day (6 hours before bedtime)   Take medications only as directed   Eat a light bedtime snack or warm drink   Warm milk   Warm herbal tea (non-caffeinated)     Stimulus Control   Do not lie awake for more than 15-20 minutes   Get out of bed and perform a quiet activity   Return to bed when sleepy   Repeat as many times per night as needed   No napping during day       Things to avoid   Do not exercise just before bedtime   No  overstimulating activities just before bed   No competitive games before bedtime   No exciting television programs before bedtime   Avoid caffeine after lunchtime   Avoid chocolate   Avoid coffee, tea, or soda   Do not use alcohol to induce sleep (worsens Insomnia)   Do not take someone else's sleeping pills   Do not look at the clock when awakening   Do not turn on light when getting up to use bathroom     Read the book Say Good Night To Insomnia      Anxiety/rumination tools: Daytime use- https://www.mojio.Bloc/            Evening use- https://www.youtube.com/watch?v=Xzqi6bhMoGk

## 2017-03-22 NOTE — MR AVS SNAPSHOT
"              After Visit Summary   3/22/2017    Rodney Hendrickson    MRN: [id number]           Patient Information     Date Of Birth          1945        Visit Information        Provider Department      3/22/2017 3:00 PM Miguel Esposito MD [facility] [place]        Today's Diagnoses     Hypersomnia    -  1    Snoring        Fatigue, unspecified type          Care Instructions    MY TREATMENT INFORMATION FOR SLEEP DISTURBANCE-  Rodney WILMER Emeka    DOCTOR : Miguel Esposito  SLEEP CENTER :  [place]  MY CONTACT NUMBER:[phone]        If I haven't had a sleep study yet, what can I expect?  A personal story from [employer]  https://[email or web address].[email or web address]/watch?v=AxPLmlRpnCs    Am I having a home sleep study?  Here is a video in case you get home and want to make sure you have done it correctly  https://[email or web address].[email or web address]/watch?v=LKB1R0sRin4&feature=youtu.be    Suspected sleep apnea: Sleep study ordered.    Follow up in sleep clinic 1-2 weeks after sleep study to discuss results of sleep study and treatment options.    Patient was advised not to drive if drowsy or sleepy.    Frequently asked questions:  1. What is Obstructive Sleep Apnea (DOMINIK)? DOMINIK is the most common type of sleep apnea. Apnea literally means, \"without breath.\" It is characterized by repetitive pauses in breathing, despite continued effort to breathe, and is usually associated with a reduction in blood oxygen saturation. Apneas can last 10 to over 60 seconds. It is caused by narrowing or collapse of the upper airway as muscles relax during sleep. Severity of sleep apnea is determined by frequency of breathing events and their effect on your sleep and oxygen levels determined during sleep testing.   2. What are the consequences of DOMINIK? Symptoms include: daytime sleepiness- possibly increasing the risk of falling asleep while driving, unrefreshing/restless sleep, snoring, insomnia, waking frequently to urinate, waking with heartburn or " reflux, reduced concentration and memory, and morning headaches. Other health consequences may include development of high blood pressure and other cardiovascular disease in persons who are susceptible. Untreated DOMINIK  can contribute to heart disease, stroke and diabetes.   3. What are the treatment options? In most situations, sleep apnea is a lifelong disease that must be managed with daily therapy. Medications are not effective for sleep apnea and surgery is generally not performed until other therapies have been tried. Therapy is usually tailored to the individual patient based on many factors including your wishes as well as severity of sleep apnea and severity of obesity. Continuous Positive Airway (CPAP) is the most reliable treatment. An oral device to hold your jaw forward is usually the next most reliable option. Other options include postioning devices (to keep you off your back), weight loss, and surgery including a tongue pacing device. There is more detail about some of these options below.            1. CPAP-  WHAT DOES IT DO AND HOW CAN I LEARN TO WEAR IT?                               BEFORE I START, CAN I WATCH A MOVIE TO GET A PLAN ON HOW TO USE CPAP?  https://www.[employer].com/watch?g=a9S31wh849Z      Continuous positive airway pressure, or CPAP, is the most effective treatment for obstructive sleep apnea. It works by blowing room air, through a mask, to hold your throat open. A decision to use CPAP is a major step forward in the pursuit of a healthier life. The successful use of CPAP will help you breathe easier, sleep better and live healthier. You can choose CPAP equipment from any durable medical equipment provider that meets your needs.  Using CPAP can be a positive experience if you keep these krishna points in mind:  1. Commitment  CPAP is not a quick fix for your problem. It involves a long-term commitment to improve your sleep and your health.    2. Communication  Stay in close communication  "with both your sleep doctor and your CPAP supplier. Ask lots of questions and seek help when you need it.    3. Consistency  Use CPAP all night, every night and for every nap. You will receive the maximum health benefits from CPAP when you use it every time that you sleep. This will also make it easier for your body to adjust to the treatment.    4. Correction  The first machine and mask that you try may not be the best ones for you. Work with your sleep doctor and your CPAP supplier to make corrections to your equipment selection. Ask about trying a different type of machine or mask if you have ongoing problems. Make sure that your mask is a good fit and learn to use your equipment properly.    5. Challenge  Tell a family member or close friend to ask you each morning if you used your CPAP the previous night. Have someone to challenge you to give it your best effort.    6. Connection   Your adjustment to CPAP will be easier if you are able to connect with others who use the same treatment. Ask your sleep doctor if there is a support group in your area for people who have sleep apnea, or look for one on the Internet.  7. Comfort   Increase your level of comfort by using a saline spray, decongestant or heated humidifier if CPAP irritates your nose, mouth or throat. Use your unit's \"ramp\" setting to slowly get used to the air pressure level. There may be soft pads you can buy that will fit over your mask straps. Look on www.CPAP.com for accessories that can help make CPAP use more comfortable.  8. Cleaning   Clean your mask, tubing and headgear on a regular basis. Put this time in your schedule so that you don't forget to do it. Check and replace the filters for your CPAP unit and humidifier.    9. Completion   Although you are never finished with CPAP therapy, you should reward yourself by celebrating the completion of your first month of treatment. Expect this first month to be your hardest period of adjustment. It " will involve some trial and error as you find the machine, mask and pressure settings that are right for you.    10. Continuation  After your first month of treatment, continue to make a daily commitment to use your CPAP all night, every night and for every nap.    CPAP-Tips to starting with success:  Begin using your CPAP for short periods of time during the day while you watch TV or read.    Use CPAP every night and for every nap. Using it less often reduces the health benefits and makes it harder for your body to get used to it.    Make small adjustments to your mask, tubing, straps and headgear until you get the right fit. Tightening the mask may actually worsen the leak.  If it leaves significant marks on your face or irritates the bridge of your nose, it may not be the best mask for you.  Speak with the person who supplied the mask and consider trying other masks. Insurances will allow you to try different masks during the first month of starting CPAP.  Insurance also covers a new mask, hose and filter about every 6 months.    Use a saline nasal spray to ease mild nasal congestion. Neti-Pot or saline nasal rinses may also help. Nasal gel sprays can help reduce nasal dryness.  Biotene mouthwash can be helpful to protect your teeth if you experience frequent dry mouth.  Dry mouth may be a sign of air escaping out of your mouth or out of the mask in the case of a full face mask.  Speak with your provider if you expect that is the case.     Take a nasal decongestant to relieve more severe nasal or sinus congestion.  Do not use Afrin (oxymetazoline) nasal spray more than 3 days in a row.  Speak with your sleep doctor if your nasal congestion is chronic.    Use a heated humidifier that fits your CPAP model to enhance your breathing comfort. Adjust the heat setting up if you get a dry nose or throat, down if you get condensation in the hose or mask.  Position the CPAP lower than you so that any condensation in the  hose drains back into the machine rather than towards the mask.    Try a system that uses nasal pillows if traditional masks give you problems.    Clean your mask, tubing and headgear once a week. Make sure the equipment dries fully.    Regularly check and replace the filters for your CPAP unit and humidifier.    Work closely with your sleep provider and your CPAP supplier to make sure that you have the machine, mask and air pressure setting that works best for you. It is better to stop using it and call your provider to solve problems than to lay awake all night frustrated with the device.    BESIDES CPAP, WHAT OTHER THERAPIES ARE THERE?      Positioning Device  Positioning devices are generally used when sleep apnea is mild and only occurs on your back.This example shows a pillow that straps around the waist. It may be appropriate for those whose sleep study shows milder sleep apnea that occurs primarily when lying flat on one's back. Preliminary studies have shown benefit but effectiveness at home may need to be verified by a home sleep test. These devices are generally not covered by medical insurance.                      Oral Appliance  What is oral appliance therapy?  An oral appliance is a small acrylic device that fits over the upper and lower teeth or tongue (similar to an orthodontic retainer or a mouth guard). This device slightly advances the lower jaw or tongue, which moves the base of the tongue forward, opens the airway, improves breathing and can effectively treat snoring and obstructive sleep apnea sleep apnea. The appliance is fabricated and customized by a qualified dentist with experience in treating snoring and sleep apnea. Oral appliances are usually well tolerated and have relatively high compliance by patients1, 2, 3.  When is an oral appliance indicated?  Oral appliance therapy is recommended as a first-line treatment for patients with primary snoring, mild sleep apnea, and for patients  with moderate sleep apnea who prefer appliance therapy to use of CPAP4, 5. Severity of sleep apnea is determined by sleep testing and is based on the number of respiratory events per hour of sleep.   How successful is oral appliance therapy?  The success rate of oral appliance therapy in patients with mild sleep apnea is 75-80% while in patients with moderate sleep apnea it is 50-70%. The chance of success in patients with severe sleep apnea is 40-50%. The research also shows that oral appliances have a beneficial effect on the cardiovascular health of DOMINIK patients at the same magnitude as CPAP therapy7.  Oral appliances should be a second-line treatment in cases of severe sleep apnea, but if not completely successful then a combination therapy utilizing CPAP plus oral appliance therapy may be effective. Oral appliances tend to be effective in a broad range of patients although studies show that the patients who have the highest success are females, younger patients, those with milder disease, and less severe obesity. 3, 6.   The chances of success are lower in patients who have more severe DOMINIK, are older, and those who are morbidly obese.     Example of an oral appliance   Finding a dentist that practices dental sleep medicine  Specific training is available through the American Academy of Dental Sleep Medicine for dentists interested in working in the field of sleep. To find a dentist who is educated in the field of sleep and the use of oral appliances, near you, visit the Web site of the American Academy of Dental Sleep Medicine; also see   http://www.accpstorage.org/newOrganization/patients/oralAppliances.pdf  To search for a dentist certified in these practices:  Http://aadsm.org/FindADentist.aspx?1  1. Obdulio et al. Objectively measured vs self-reported compliance during oral appliance therapy for sleep-disordered breathing. Chest 2013; 144(5): 9128-2151.  2. Alee et al. Objective measurement of  compliance during oral appliance therapy for sleep-disordered breathing. Thorax 2013; 68(1): 91-96.  3. Jose Carlos, et al. Mandibular advancement devices in 620 men and women with DOMINIK and snoring: tolerability and predictors of treatment success. Chest 2004; 125: 3977-2635.  4. Jordan et al. Oral appliances for snoring and DOMINIK: a review. Sleep 2006; 29: 244-262.  5. Augustine et al. Oral appliance treatment for DOMINIK: an update. J Clin Sleep Med 2014; 10(2): 215-227.  6. Reggie et al. Predictors of OSAH treatment outcome. J Dent Res 2007; 86: 6738-4018.      Weight Loss:    Weight management is a personal decision.  If you are interested in exploring weight loss strategies, the following discussion covers the impact on weight loss on sleep apnea and the approaches that may be successful.    Weight loss decreases severity of sleep apnea in most people with obesity. For those with mild obesity who have developed snoring with weight gain, even 15-30 pound weight loss can improve and occasionally eliminate sleep apnea.  Structured and life-long dietary and health habits are necessary to lose weight and keep healthier weight levels.     Though there may be significant health benefits from weight loss, long-term weight loss is very difficult to achieve- studies show success with dietary management in less than 10% of people. In addition, substantial weight loss may require years of dietary control and may be difficult if patients have severe obesity. In these cases, surgical management may be considered.  Finally, older individuals who have tolerated obesity without health complications may be less likely to benefit from weight loss strategies.    Your BMI is Body mass index is 33.8 kg/(m^2).  Body mass index (BMI) is one way to tell whether you are at a healthy weight, overweight, or obese. It measures your weight in relation to your height.  A BMI of 18.5 to 24.9 is in the healthy range. A person with a BMI of 25 to  29.9 is considered overweight, and someone with a BMI of 30 or greater is considered obese. More than two-thirds of American adults are considered overweight or obese.  Being overweight or obese increases the risk for further weight gain. Excess weight may lead to heart disease and diabetes.  Creating and following plans for healthy eating and physical activity may help you improve your health.  Weight control is part of healthy lifestyle and includes exercise, emotional health, and healthy eating habits. Careful eating habits lifelong are the mainstay of weight control. Though there are significant health benefits from weight loss, long-term weight loss with diet alone may be very difficult to achieve- studies show long-term success with dietary management in less than 10% of people. Attaining a healthy weight may be especially difficult to achieve in those with severe obesity. In some cases, medications, devices and surgical management might be considered.  What can you do?  If you are overweight or obese and are interested in methods for weight loss, you should discuss this with your provider.     Consider reducing daily calorie intake by 500 calories.     Keep a food journal.     Avoiding skipping meals, consider cutting portions instead.    Diet combined with exercise helps maintain muscle while optimizing fat loss. Strength training is particularly important for building and maintaining muscle mass. Exercise helps reduce stress, increase energy, and improves fitness. Increasing exercise without diet control, however, may not burn enough calories to loose weight.       Start walking three days a week 10-20 minutes at a time    Work towards walking thirty minutes five days a week     Eventually, increase the speed of your walking for 1-2 minutes at time    In addition, we recommend that you review healthy lifestyles and methods for weight loss available through the National Institutes of Health patient  information sites:  http://win.niddk.nih.gov/publications/index.htm    And look into health and wellness programs that may be available through your health insurance provider, employer, local community center, or eliana club.    Weight management plan: Patient was referred to their PCP to discuss a diet and exercise plan.    Surgery:    Upper Airway Surgery for DOMINIK  Surgery for DOMINIK is a second-line treatment option in the management of sleep apnea.  Surgery should be considered for patients who are having a difficult time tolerating CPAP.    Surgery for DOMINIK is directed at areas that are responsible for narrowing or complete obstruction of the airway during sleep.  There are a wide range of procedures available to enlarge and/or stabilize the airway to prevent blockage of breathing in the three major areas where it can occur: the palate, tongue, and nasal regions.  Successful surgical treatment depends on the accurate identification of the factors responsible for obstructive sleep apnea in each person.  A personalized approach is required because there is no single treatment that works well for everyone.  Because of anatomic variation, consultation with an examination by a sleep surgeon is a critical first step in determining what surgical options are best for each patient.  In some cases, examination during sedation may be recommended in order to guide the selection of procedures.  Patients will be counseled about risks and benefits as well as the typical recovery course after surgery. Surgery is typically not a cure for a person s DOMINIK.  However, surgery will often significantly improve one s DOMINIK severity (termed  success rate ).  Even in the absence of a cure, surgery will decrease the cardiovascular risk associated with OSA7; improve overall quality of life8 (sleepiness, functionality, sleep quality, etc).          Palate Procedures:  Patients with DOMINIK often have narrowing of their airway in the region of their  tonsils and uvula.  The goals of palate procedures are to widen the airway in this region as well as to help the tissues resist collapse.  Modern palate procedure techniques focus on tissue conservation and soft tissue rearrangement, rather than tissue removal.  Often the uvula is preserved in this procedure. Residual sleep apnea is common in patient after pharyngoplasty with an average reduction in sleep apnea events of 33%2.      Tongue Procedures:  While patients are awake, the muscles that surround the throat are active and keep this region open for breathing. These muscles relax during sleep, allowing the tongue and other structures to collapse and block breathing.  There are several different tongue procedures available.  Selection of a tongue base procedure depends on characteristics seen on physical exam.  Generally, procedures are aimed at removing bulky tissues in this area or preventing the back of the tongue from falling back during sleep.  Success rates for tongue surgery range from 50-62%3.    Hypoglossal Nerve Stimulation:  Hypoglossal nerve stimulation has recently received approval from the [place] Food and Drug Administration for the treatment of obstructive sleep apnea.  This is based on research showing that the system was safe and effective in treating sleep apnea6.  Results showed that the median AHI score decreased 68%, from 29.3 to 9.0. This therapy uses an implant system that senses breathing patterns and delivers mild stimulation to airway muscles, which keeps the airway open during sleep.  The system consists of three fully implanted components: a small generator (similar in size to a pacemaker), a breathing sensor, and a stimulation lead.  Using a small handheld remote, a patient turns the therapy on before bed and off upon awakening.    Candidates for this device must be greater than 22 years of age, have moderate to severe DOMINIK (AHI between 20-65), BMI less than 32, have tried  CPAP/oral appliance without success, and have appropriate upper airway anatomy (determined by a sleep endoscopy performed by Dr. Samuels).    Hypoglossal Nerve Stimulation Pathway:    The sleep surgeon s office will work with the patient through the insurance prior-authorization process (including communications and appeals).    Nasal Procedures:  Nasal obstruction can interfere with nasal breathing during the day and night.  Studies have shown that relief of nasal obstruction can improve the ability of some patients to tolerate positive airway pressure therapy for obstructive sleep apnea1.  Treatment options include medications such as nasal saline, topical corticosteroid and antihistamine sprays, and oral medications such as antihistamines or decongestants. Non-surgical treatments can include external nasal dilators for selected patients. If these are not successful by themselves, surgery can improve the nasal airway either alone or in combination with these other options.      Combination Procedures:  Combination of surgical procedures and other treatments may be recommended, particularly if patients have more than one area of narrowing or persistent positional disease.  The success rate of combination surgery ranges from 66-80%2,3.      1. Louise RICE. The Role of the Nose in Snoring and Obstructive Sleep Apnoea: An Update.  Eur Arch Otorhinolaryngol. 2011; 268: 1365-73.  2.  Capjerod SM; Jayashree JA; Kaye JR; Pallanch JF; Maude MB; Flo SG; Clare BUSTAMANTE. Surgical modifications of the upper airway for obstructive sleep apnea in adults: a systematic review and meta-analysis. SLEEP 2010;33(10):3012-0650. Danette JASSO. Hypopharyngeal surgery in obstructive sleep apnea: an evidence-based medicine review.  Arch Otolaryngol Head Neck Surg. 2006 Feb;132(2):206-13.  3. Bill MCADAMSH1, Carolyn Y, Constantine KAYLEIGH. The efficacy of anatomically based multilevel surgery for obstructive sleep apnea. Otolaryngol Head Neck Surg. 2003  Oct;129(4):327-35.  4. Danette JASSO, Goldberg A. Hypopharyngeal Surgery in Obstructive Sleep Apnea: An Evidence-Based Medicine Review. Arch Otolaryngol Head Neck Surg. 2006 Feb;132(2):206-13.  5. Ade VALERIO et al. Upper-Airway Stimulation for Obstructive Sleep Apnea.  N Engl J Med. 2014 Jan 9;370(2):139-49.  6. Ayanna Y et al. Increased Incidence of Cardiovascular Disease in Middle-aged Men with Obstructive Sleep Apnea. Am J Respir Crit Care Med; 2002 166: 159-165  7. Bosch EM et al. Studying Life Effects and Effectiveness of Palatopharyngoplasty (SLEEP) study: Subjective Outcomes of Isolated Uvulopalatopharyngoplasty. Otolaryngol Head Neck Surg. 2011; 144: 623-631.    General recommendations for sleep problems (Insomnia)  Allow 2-4 weeks to see results     Establish a regular sleep schedule   Go to bed at same time each night   Get up at same time each day   Avoid sleeping-in on Sunday morning   Cut down time in bed (if not asleep, get up)   Avoid trying to force yourself to sleep   Use your bed only for sleep and sex   Do not read or watch television in bed   Make the bedroom comfortable   Keep temperature in your bedroom comfortable   Keep bedroom quiet when sleeping   Consider ear plugs (silicon)   Keep bedroom dark enough   Use dark blinds or wear an eye mask if needed     Relax at bedtime   Relax each muscle group individually   Begin with your feet   Work toward your head     Deal with your worries before bedtime   Set aside a worry time earlier in the day for 15 minutes.    You may either list or journal about your concerns.    Thought stopping methods include:  1.  Acknowledge the thought and let it go.  2.  Visual imagery.    Listen to relaxation tapes   Classical Music or Nature sounds   Imagine a tranquil scene (e.g. waterfall or beach)   Back Massage   Gentle 5-minute back rub prior to bedtime   Perform measures to make you tired at bedtime   Get regular exercise each day (6 hours before bedtime)   Take  medications only as directed   Eat a light bedtime snack or warm drink   Warm milk   Warm herbal tea (non-caffeinated)     Stimulus Control   Do not lie awake for more than 15-20 minutes   Get out of bed and perform a quiet activity   Return to bed when sleepy   Repeat as many times per night as needed   No napping during day       Things to avoid   Do not exercise just before bedtime   No overstimulating activities just before bed   No competitive games before bedtime   No exciting television programs before bedtime   Avoid caffeine after lunchtime   Avoid chocolate   Avoid coffee, tea, or soda   Do not use alcohol to induce sleep (worsens Insomnia)   Do not take someone else's sleeping pills   Do not look at the clock when awakening   Do not turn on light when getting up to use bathroom     Read the book Say Good Night To Insomnia      Anxiety/rumination tools: Daytime use- https://www.[employer]/            Evening use- https://www.[employer].com/watch?v=Zhkh2jwEpQd              Follow-ups after your visit        Future tests that were ordered for you today     Open Future Orders        Priority Expected Expires Ordered    HST-Home Sleep Apnea Test Routine  9/21/2017 3/22/2017            Who to contact     If you have questions or need follow up information about today's clinic visit or your schedule please contact [facility] directly at [phone].  Normal or non-critical lab and imaging results will be communicated to you by MyChart, letter or phone within 4 business days after the clinic has received the results. If you do not hear from us within 7 days, please contact the clinic through MyChart or phone. If you have a critical or abnormal lab result, we will notify you by phone as soon as possible.  Submit refill requests through [employer] or call your pharmacy and they will forward the refill request to us. Please allow 3 business days for your refill to be completed.           "Additional Information About Your Visit        MyChart Information     [employer] lets you send messages to your doctor, view your test results, renew your prescriptions, schedule appointments and more. To sign up, go to [email or web address] . Click on \"Log in\" on the left side of the screen, which will take you to the Welcome page. Then click on \"Sign up Now\" on the right side of the page.     You will be asked to enter the access code listed below, as well as some personal information. Please follow the directions to create your username and password.     Your access code is: [email or web address]  Expires: 2017 10:02 AM     Your access code will  in 90 days. If you need help or a new code, please call your [place] clinic or [phone].        Care EveryWhere ID     This is your Care EveryWhere ID. This could be used by other organizations to access your [place] medical records  [id number]        Your Vitals Were     Pulse Respirations Height Pulse Oximetry BMI (Body Mass Index)       71 18 1.626 m (5' 4.02\") 97% 33.8 kg/m2        Blood Pressure from Last 3 Encounters:   17 139/86   17 168/82   17 110/74    Weight from Last 3 Encounters:   17 89.4 kg (197 lb)   17 93 kg (205 lb)   17 93 kg (205 lb)              We Performed the Following     SLEEP EVALUATION & MANAGEMENT REFERRAL - ADULT        Primary Care Provider Fax #    [facility] [phone]       No address on file        Thank you!     Thank you for choosing [facility]  for your care. Our goal is always to provide you with excellent care. Hearing back from our patients is one way we can continue to improve our services. Please take a few minutes to complete the written survey that you may receive in the mail after your visit with us. Thank you!             Your Updated Medication List - Protect others around you: Learn how to safely use, store and throw away your medicines " at www.disposemymeds.org.          This list is accurate as of: 3/22/17  3:43 PM.  Always use your most recent med list.                   Brand Name Dispense Instructions for use    aspirin 81 MG tablet      Take  by mouth daily.       ferrous sulfate 325 (65 FE) MG tablet    IRON     Take by mouth daily (with breakfast)       finasteride 5 MG tablet    PROSCAR    90 tablet    Take 1 tablet (5 mg) by mouth daily       LIPITOR PO      Take 40 mg by mouth.       lisinopril-hydrochlorothiazide 20-12.5 MG per tablet    PRINZIDE/ZESTORETIC     Take 1 tablet by mouth daily       PRILOSEC PO      Take 20 mg by mouth 2 times daily (before meals)

## 2017-03-22 NOTE — NURSING NOTE
"Chief Complaint   Patient presents with     Consult     Snores per family.  Can get to sleep but not stay asleep.  Tired during the day.  No SS or cpap.       Initial /86  Pulse 71  Resp 18  Ht 1.626 m (5' 4.02\")  Wt 89.4 kg (197 lb)  SpO2 97%  BMI 33.8 kg/m2 Estimated body mass index is 33.8 kg/(m^2) as calculated from the following:    Height as of this encounter: 1.626 m (5' 4.02\").    Weight as of this encounter: 89.4 kg (197 lb).  Medication Reconciliation: complete       Neck 46cm  18 1/4in  Ess  18    Jasmin Buenrostro LPN/MA  "

## 2017-03-22 NOTE — PROGRESS NOTES
[facility]  Outpatient Sleep Medicine Consultation  March 22, 2017      Name: Rodney Hendrickson MRN# [id number]   Age: 71 year old YOB: 1945     Date of Consultation: March 22, 2017  Consultation is requested by: Brennan Mercado MD  [facility]  Primary care provider: [facility]         Reason for Sleep Consult:     Rodney Hendrickson is a 71 year old male nightly apnea, snoring, gasping and poor quality of sleep, excessive daytime sleepiness and tiredness         Assessment and Plan:     Summary Sleep Diagnoses/Recommendations:    1. Sleep Disturbance/hypersomnia:  High suspicion of sleep disordered breathing based on patient's symptoms (snoring, excessive daytime sleepiness, witnessed apneas), high BMI, neck circumference and oropharyngeal examination). Will schedule Home sleep study. We also discussed the pathophysiology of sleep disordered breathing and the importance of treating it if S/he should have it. Patient is advised not to drive if he/she feels drowsy or sleepy.  Follow up after sleep study to discuss the result of sleep study and treatment options.    2. Maintenance insomnia secondary to untreated sleep disordered breathing, will order HST.    3. Obesity:  Counseled regarding weight loss through diet modification and increased physical activity. Patient was given instuctions of weight loss and advised to follow up her PCP for further weight loss interventions.        Orders Placed This Encounter   Procedures     HST-Home Sleep Apnea Test       Summary Counseling:  See instructions    Counseling included a comprehensive review of diagnostic and therapeutic strategies as well as risks of inadequate therapy.  Educational materials provided in instructions.    All questions were answered.  The patient indicates understanding of the above issues and agrees with the plan set forth.           History of Present Illness:     Rodney Hendrickson is a 71 year  old male with history of hypertension, hyperlipidemia, GERD and BPH who presents to the [facility] in [place] with complains of sleep disturbance and evaluation of sleep apnea. Patient was referred by dr. Mercado for evaluation of snoring and fatigue. He complains loud snoring, gasping, witnessed apnea, heartburn, excessive daytime sleepiness and tiredness, these symptoms worse for the last one month. He has no difficulty falling sleep but had difficulty staying sleep as he wakes up many times a night. He kicks his legs, and he use to have urge to move his legs in the past. He falls sleep in chair but no takes a nap. His snoring is so loud wife can heard from basement.      Please see below for sleep ROS details.    PREVIOUS IN- LAB or HOME SLEEP STUDIES:   None     SLEEP-WAKE SCHEDULE:     Rodney Hendrickson     -Describes themself as neither a morning or night person;      -ON WEEKDAYS and ON WEEKENDS, goes to sleep at 10:30 PM during the week; awakens  5:30-6 AM without an alarm; falls asleep in 1 minutes; denies difficulty falling asleep.     -Awakens >8 times a night for 5 minutes before falling back to sleep; awakens to go to the bathroom, uncertain reasons and external stimuli. He is awake for 120-180 minutes sometimes. He lays bed and watches his clock    -Total sleep time: 5 hours per night.    -Naps 2 times/days per week for 20-30 minutes, feels refreshed after naps; takes some inadvertant naps.       BEDTIME ACTIVITIES AND SHIFT WORK:    Rodney Hendrickson    -does not use electronics in bed, watch TV in bed and read in bed.     -does not do shift work.  He is retired     SCALES       SLEEP APNEA: [employer]bang score: 7       INSOMNIA:  Insomnia severity score: 19       SLEEPINESS: [place] sleepiness scale (ESS):  18   Drowsy driving lately but no near accidents. He stops and takes nap with long distance drives          PHQ9: N/A    SLEEP COMPLAINTS:   Snoring- 7 days/week  Witness apnea:  Yes  Gasping/Choking: Yes  Excessive daytime sleep: Yes  Toss/turn: No  Excessive tiredness/fatigue:  Yes  Morning headaches: No  Dry mouth/throat: Yes  Dyspnea: No  Coexisting Lung disease: No    Coexisting Heart disease: No    Does patient have a bed partner: Yes  Has bed partner been sleeping separately because of snoring:  No and yes            RLS Screen: When you try to relax in the evening or sleep at  night, do you ever have unpleasant, restless feelings in your  legs that can be relieved by walking or movement? No    Periodic limb movement: Yes    Narcolepsy:       denies sudden urges of sleep attacks     denies cataplexy     occasional sleep paralysis      denies hallucinations     Sleep Behaviors:     denies leg symptoms/movements     denies motor restlessness     denies night terrors     denies bruxism     denies automatic behaviors    Other subjective complaints:     denies anxiety or rumination      denies pain and discomfort at  night     denies waking up with heart pounding or racing     Yes GERD/heartburn         Parasomnia:   NREM - denies recurrent persistent confusional arousal, night eating, sleep walking or sleep terrors   REM  - denies dream enactment; injuries     Safety: None             Medications:     Current Outpatient Prescriptions   Medication Sig     ferrous sulfate (IRON) 325 (65 FE) MG tablet Take by mouth daily (with breakfast)     finasteride (PROSCAR) 5 MG tablet Take 1 tablet (5 mg) by mouth daily     lisinopril-hydrochlorothiazide (PRINZIDE,ZESTORETIC) 20-12.5 MG per tablet Take 1 tablet by mouth daily     Omeprazole (PRILOSEC PO) Take 20 mg by mouth 2 times daily (before meals)      Atorvastatin Calcium (LIPITOR PO) Take 40 mg by mouth.     aspirin 81 MG tablet Take  by mouth daily.     No current facility-administered medications for this visit.      Facility-Administered Medications Ordered in Other Visits   Medication     benzocaine (HURRICAINE/TOPEX) 20 % spray         Medication that can affect sleep: none     No Known Allergies         Past Medical History:     Does not need 02 supplement at night     Past Medical History:   Diagnosis Date     BPH (benign prostatic hyperplasia)      GERD (gastroesophageal reflux disease)      Hyperlipidemia LDL goal <130      Hypertension                Past Surgical History:    No previous upper airway surgery     Past Surgical History:   Procedure Laterality Date     ARTHROPLASTY PATELLO-FEMORAL (KNEE)      right     COLONOSCOPY       COLONOSCOPY N/A 1/18/2017    Procedure: COLONOSCOPY;  Surgeon: Geoffrey Gallegos MD;  Location:  GI     ESOPHAGOSCOPY, GASTROSCOPY, DUODENOSCOPY (EGD), COMBINED  5/3/2013    Procedure: COMBINED ESOPHAGOSCOPY, GASTROSCOPY, DUODENOSCOPY (EGD), BIOPSY SINGLE OR MULTIPLE;  ESOPHAGOSCOPY, GASTROSCOPY, DUODENOSCOPY (EGD) with biopies;  Surgeon: Geoffrey Gallegos MD;  Location:  GI     ESOPHAGOSCOPY, GASTROSCOPY, DUODENOSCOPY (EGD), COMBINED N/A 1/8/2016    Procedure: COMBINED ESOPHAGOSCOPY, GASTROSCOPY, DUODENOSCOPY (EGD), BIOPSY SINGLE OR MULTIPLE;  Surgeon: Geoffrey Gallegos MD;  Location:  GI     ESOPHAGOSCOPY, GASTROSCOPY, DUODENOSCOPY (EGD), COMBINED N/A 1/18/2017    Procedure: COMBINED ESOPHAGOSCOPY, GASTROSCOPY, DUODENOSCOPY (EGD), BIOPSY SINGLE OR MULTIPLE;  Surgeon: Geoffrey Gallegos MD;  Location:  GI     ROTATOR CUFF REPAIR RT/LT      right and left            Social History:     Social History   Substance Use Topics     Smoking status: Former Smoker     Quit date: 4/30/1983     Smokeless tobacco: Not on file     Alcohol use Yes      Comment: occa         Chemical History:     Tobacco: No/former smoker     Uses 3 cups/day of coffee. Last caffeine intake is usually before 9:30 am    EtOH: No   Recreational Drugs: No    Psych Hx:   None    Current dangers to self or others: None           Family History:     Family History   Problem Relation Age of Onset     Hypertension Father      Colon  "Cancer Father       of Colon CA     Heart Defect Father      Enlarged heart     Hypertension Brother      Hypertension Sister      Hypertension Brother         Sleep Family Hx:        RLS- No  DOMINIK - brother and sister  Insomnia - No  Parasomnia - sister with sleepwalk when she was child         Review of Systems:     A complete 10 point review of systems was negative other than HPI or as commented below:   Patient denies chest pain, dyspnea with activity and or rest, wheezing, abdominal pain, n&v, fever, chills, dysuria, leg pain or swelling. Patient is also denies ear pain, sore throat, postnasal drip, running nose. He has dry cough and frequent urination.      Rodney Hendrickson has gained 20 pounds in 10 years.            Physical Examination:   /86  Pulse 71  Resp 18  Ht 1.626 m (5' 4.02\")  Wt 89.4 kg (197 lb)  SpO2 97%  BMI 33.8 kg/m2     Neck Circumference: 46 cm   Constitutional: . Awake, alert, cooperative, in no apparent distress  Mood: euthymic; affect congruent with full range and intensity.  Attention/Concentration:  Normal   Eyes: Pupils round and reactive. No icterus.  ENT: Mallampati Class: IV.   Tonsillar Stage: 1  hidden by pillars  Clear nasal passages. Enlarged inferior turbinates. Nasal deviated septum to left.  Oropharynx: No high arched palate. No pharyngeal erythema or exudates, elongated uvula. No lateral narrowing  Tongue: No macroglossia   Dentition: fair.  Dentures: None  Neck: Supple, no thyroid enlargement.   Cardiovascular: Regular S1 and S2, no gallops or murmurs.   Pulmonary:  Chest symmetric, lungs clear bilaterally and no crackles, wheezes or rales.  Abdomen: Soft, obese, non tender.  Extremities:  No pedal edema.  Muscle/joint: Strength and tone normal   Skin:  No rash or significant lesions.   Neurologic: Alert, oriented x3, no focal neurological deficit.           Data: All pertinent previous laboratory data reviewed     None     Echocardiography: No    Chest x-ray: " No    PFT: No        Copy to: [facility]      Miguel Esposito MD 3/22/2017   [facility]  [address], [place] [address]   [phone] Clinic    Total time spent with patient:  55 minutes with this patient today in which 25 minutes was spent in counseling/coordination of care and going over planned testing and recommendations.

## 2017-04-12 ENCOUNTER — OFFICE VISIT (OUTPATIENT)
Dept: SLEEP MEDICINE | Facility: CLINIC | Age: 72
End: 2017-04-12
Payer: COMMERCIAL

## 2017-04-12 DIAGNOSIS — G47.10 HYPERSOMNIA: Primary | ICD-10-CM

## 2017-04-12 PROCEDURE — G0399 HOME SLEEP TEST/TYPE 3 PORTA: HCPCS | Performed by: INTERNAL MEDICINE

## 2017-04-12 NOTE — MR AVS SNAPSHOT
After Visit Summary   4/12/2017    Rodney Hendrickson    MRN: [id number]           Patient Information     Date Of Birth          1945        Visit Information        Provider Department      4/12/2017 10:00 AM [facility]        Today's Diagnoses     Hypersomnia    -  1       Follow-ups after your visit        Your next 10 appointments already scheduled     Apr 13, 2017 10:00 AM CDT   HST Drop Off with [facility]   [facility]    [address] [place] [id number]   [phone]            Apr 25, 2017  2:30 PM CDT   Return Sleep Patient with Miguel Esposito MD   [facility]    [address] [place] [id number] [phone]            Sep 28, 2017  2:30 PM CDT   Return Sleep Patient with Miguel Esposito MD   [facility])    [address] [place] [id number] [phone]              Who to contact     If you have questions or need follow up information about today's clinic visit or your schedule please contact [facility] directly at [phone].  Normal or non-critical lab and imaging results will be communicated to you by MyChart, letter or phone within 4 business days after the clinic has received the results. If you do not hear from us within 7 days, please contact the clinic through MyChart or phone. If you have a critical or abnormal lab result, we will notify you by phone as soon as possible.  Submit refill requests through [employer] or call your pharmacy and they will forward the refill request to us. Please allow 3 business days for your refill to be completed.          Additional Information About Your Visit        [employer] Information     [employer] lets you send messages to your  "doctor, view your test results, renew your prescriptions, schedule appointments and more. To sign up, go to [email or web address] . Click on \"Log in\" on the left side of the screen, which will take you to the Welcome page. Then click on \"Sign up Now\" on the right side of the page.     You will be asked to enter the access code listed below, as well as some personal information. Please follow the directions to create your username and password.     Your access code is: [email or web address]  Expires: 2017 10:02 AM     Your access code will  in 90 days. If you need help or a new code, please call your [place] clinic or [phone].        Care EveryWhere ID     This is your Care EveryWhere ID. This could be used by other organizations to access your [place] medical records  [id number]         Blood Pressure from Last 3 Encounters:   17 139/86   17 168/82   17 110/74    Weight from Last 3 Encounters:   17 89.4 kg (197 lb)   17 93 kg (205 lb)   17 93 kg (205 lb)              Today, you had the following     No orders found for display       Primary Care Provider Fax #    [facility] [phone]       No address on file        Thank you!     Thank you for choosing [facility]  for your care. Our goal is always to provide you with excellent care. Hearing back from our patients is one way we can continue to improve our services. Please take a few minutes to complete the written survey that you may receive in the mail after your visit with us. Thank you!             Your Updated Medication List - Protect others around you: Learn how to safely use, store and throw away your medicines at www.disposemymeds.org.          This list is accurate as of: 17 10:14 AM.  Always use your most recent med list.                   Brand Name Dispense Instructions for use    aspirin 81 MG tablet      Take  by mouth daily.       ferrous sulfate 325 (65 FE) " MG tablet    IRON     Take by mouth daily (with breakfast)       finasteride 5 MG tablet    PROSCAR    90 tablet    Take 1 tablet (5 mg) by mouth daily       LIPITOR PO      Take 40 mg by mouth.       lisinopril-hydrochlorothiazide 20-12.5 MG per tablet    PRINZIDE/ZESTORETIC     Take 1 tablet by mouth daily       PRILOSEC PO      Take 20 mg by mouth 2 times daily (before meals)

## 2017-04-12 NOTE — PROGRESS NOTES
Patient picked up Hst 4/12/17. Demonstrated back for knowledge of use.  Will return Hst 4/13/17.        Jasmin Buenrostro LPN/MA

## 2017-04-13 ENCOUNTER — DOCUMENTATION ONLY (OUTPATIENT)
Dept: SLEEP MEDICINE | Facility: CLINIC | Age: 72
End: 2017-04-13

## 2017-04-13 NOTE — PROGRESS NOTES
This HST performed using a Noxturnal T3 device which recorded snore, sound, movement activity, body position, nasal pressure, oronasal thermal airflow, pulse, oximetry and both chest and abdominal respiratory effort. HST data was confined to the time patients states they were in bed.   Patient was score using 1B rules. Patient respiratory events showed an AHI 25.2 with audible loud  Snoring. Patient SP02 below 89% was 16.1 minutes. Overall signal quality was good.    Pt will follow up with sleep provider to determine appropriate therapy.

## 2017-04-17 NOTE — PROCEDURES
"  [facility] Study Report  ===========================    Patient Information:  --------------------  Rodney Hendrickson  Patient ID:  [id number]   :  1945  Recording date:  2017     Indication of the sleep study: Rodney Hendrickosn is a 71 year old male with history of hypertension, hyperlipidemia, GERD and BPH who was seen at the [facility] in [place] with complains of  snoring and fatigue. He complains loud snoring, gasping air, witnessed apnea, heartburn, excessive daytime sleepiness and tiredness, these symptoms worse for the last one month. Ht 1.626 m (5' 4.02\")  Wt 89.4 kg (197 lb)  SpO2 97%  BMI 33.8 kg/m2.    Recording Information:  ----------------------  This was a Type 3 unattended sleep study (measuring flow, effort, heart rate and pulse oximetry) performed at home. Please refer to EPIC procedure for detailed scoring report.  Recording date:  2017   Recording duration:  599.9 minutes  Time in bed:  596.7 minutes  Estimated sleep efficiency was 94.2 %.   Time spent in supine position:  15.4 % of total bed time  The test quality was: adequate for interpretation  The test duration was: adequate for interpretation  Respiratory Analysis:  ---------------------  AHI: 25.5 /hour  AHI (supine):  65.8 /hour  AHI (non-supine):  18.2 /hour  INDER: 16.8 /hour (Number of oxygen desaturations per hour)  Snore index: 36.1 (percentage of time spent snoring versus the total time spent in bed)  Central apnea index:  1.0 / hour    The sleep study demonstrated moderate sleep disordered breathing which was characterized predominantly by obstructive apneas and hypopneas. The sleep-disordered breathing was predominantly in supine body position. The severity of sleep disordered breathing were underestimated by limited time for supine sleep and poor signal quality.    Oximetry Analysis:  ------------------  Baseline oxygen saturation during sleep was normal.   Lowest oxygen saturation:  71.0 " %  Majority of the sleep time spent with oxygen saturation greater than 90%.   3.2% of the total recording time was spent with oxygen saturation less than 90%.   Time Spent oxygen saturation below 88% was 16.1 minutes.    Cardiac Analysis:  -----------------  Maximum pulse rate was 109.0 /minute and minimal pulse rate was 40.0/minute. Time spent above 100 bpm was 2.0 minutes and time spent below 40 bpm was 0.0 minutes.    Diagnosis:  ==========  Moderate obstructive sleep apnea G47.33  Sleep related hypoxemia G47.36      Recommendations:   ================    1. Based on the presence of the obstructive sleep apnea, treatment could be empirically initiated with Auto-titrating PAP therapy with a range of 8 - 16 cmH2O. Recommend clinical follow up with sleep management team.    2. Fit nasal mask with chin strap or as per patient s preference.   3. Recommend clinical follow up with sleep management team after using PAP for 4-6 weeks for coaching and effectiveness and measures.     4. Sleep related hypoxemia may very well resolve once obstruction/apnea caused by sleep disordered is addressed but if it persists on overnight oximetry could consider supplemental O2 therapy.    5. Recommend optimizing sleep schedule and avoiding sleep deprivation.      Other Recommendations:  ======================  1. Start weight loss program if BMI > 25.    2. Avoid sedating medications, including narcotics, and alcohol, as these may exacerbate sleep apnea and/or underlying respiratory disorders.     3. Avoid sleeping supine position.    4. Avoid driving when drowsy    5. If unable to follow in sleep clinic, then patient should follow with referring physician/primary care doctor.        Electronically signed by:        Miguel Esposito MD  Sleep Medicine Physician  [facility].

## 2017-04-25 ENCOUNTER — OFFICE VISIT (OUTPATIENT)
Dept: SLEEP MEDICINE | Facility: CLINIC | Age: 72
End: 2017-04-25
Payer: COMMERCIAL

## 2017-04-25 VITALS
HEART RATE: 89 BPM | BODY MASS INDEX: 33.65 KG/M2 | SYSTOLIC BLOOD PRESSURE: 128 MMHG | OXYGEN SATURATION: 97 % | HEIGHT: 64 IN | WEIGHT: 197.09 LBS | DIASTOLIC BLOOD PRESSURE: 81 MMHG | RESPIRATION RATE: 18 BRPM

## 2017-04-25 DIAGNOSIS — E66.811 OBESITY (BMI 30.0-34.9): ICD-10-CM

## 2017-04-25 DIAGNOSIS — G47.33 OSA (OBSTRUCTIVE SLEEP APNEA): Primary | ICD-10-CM

## 2017-04-25 DIAGNOSIS — G47.34 SLEEP RELATED HYPOXIA: ICD-10-CM

## 2017-04-25 PROCEDURE — 99214 OFFICE O/P EST MOD 30 MIN: CPT | Performed by: INTERNAL MEDICINE

## 2017-04-25 NOTE — MR AVS SNAPSHOT
After Visit Summary   4/25/2017    Rodney Hendrickson    MRN: [id number]           Patient Information     Date Of Birth          1945        Visit Information        Provider Department      4/25/2017 2:30 PM Miguel Esposito MD [facility]        Today's Diagnoses     DOMINIK (obstructive sleep apnea)    -  1    Sleep related hypoxia        Obesity (BMI 30.0-34.9)          Care Instructions    MY TREATMENT INFORMATION FOR SLEEP APNEA-  Rodney Hendrickson    MY CONTACT NUMBERS ARE:  [phone]  DOCTOR : Miguel Esposito  SLEEP CENTER :  [facility]  CPAP EQUIPMENT     You have sleep apnea, CPAP is prescribed for you.    You will be provided with an auto-titrating CPAP with a pressure range of 8-16 cmH2O with heated humidity to limit nasal congestion. Adjust the heat level on humidifier to find a setting that prevents dry nose but does not cause condensation in the hose or mask. Use distilled water in the humidifier.    The CPAP has a ramp function that starts the pressure lower than your prescribed pressure and gradually increases it over a number of minutes.  This may make it easier to fall asleep.                  Try to use the CPAP every-night, all night, at least 7-8 hours each night.  Daytime naps are not advised, but use CPAP if taking naps. Many insurances require that we prove you are using the CPAP at least 4 hours on at least 70% of nights over a 30 day period. We have 90 days to meet those criteria.       Patient was advised not to drive if drowsy or sleepy.                Discussed weight management and the impact of weight gain on sleep apnea.  Let me know if you snore or feel the pressure is too high.    You can get new supplies (mask, hose and filter) for your CPAP every 3-6 months, covered by insurance. You do not need to get supplies that often, but they are available if you would like them.  You may exchange the mask once within the first month if you feel the  "initial mask does not fit well.  Contact your medical equipment provider for equipment issues.  Please let me know if you have any return of snoring, daytime sleepiness or poor sleep quality. We will want to make sure your CPAP is adequately treating your apnea.  There is a website called CPAP.com that has accessories that may make CPAP use easier. Please visit it at your convenience.    Our phone number is [phone]    Follow-up 4-6 weeks after PAP usage.  Bring your CPAP machine with you to the follow up appointment.      Frequently asked questions:  1. What is Obstructive Sleep Apnea (DOMINIK)? DOMINIK is the most common type of sleep apnea. Apnea literally means, \"without breath.\" It is characterized by repetitive pauses in breathing, despite continued effort to breathe, and is usually associated with a reduction in blood oxygen saturation. Apneas can last 10 to over 60 seconds. It is caused by narrowing or collapse of the upper airway as muscles relax during sleep. Severity of sleep apnea is determined by frequency of breathing events and their effect on your sleep and oxygen levels determined during sleep testing.   2. What are the consequences of DOMINIK? Symptoms include: daytime sleepiness- possibly increasing the risk of falling asleep while driving, unrefreshing/restless sleep, snoring, insomnia, waking frequently to urinate, waking with heartburn or reflux, reduced concentration and memory, and morning headaches. Other health consequences may include development of high blood pressure and other cardiovascular disease in persons who are susceptible. Untreated DOMINIK  can contribute to heart disease, stroke and diabetes.   3. What are the treatment options? In most situations, sleep apnea is a lifelong disease that must be managed with daily therapy. Medications are not effective for sleep apnea and surgery is generally not performed until other therapies have been tried. Therapy is usually tailored to the individual " patient based on many factors including your wishes as well as severity of sleep apnea and severity of obesity. Continuous Positive Airway (CPAP) is the most reliable treatment. An oral device to hold your jaw forward is usually      IF I HAVE SLEEP APNEA.....  WHERE CAN I FIND MORE INFORMATION?    American Academy of Sleep Medicine Patient information on sleep disorders:  http://yoursleep.aasmnet.org    THINGS I SHOULD REMEMBER  In most situations, sleep apnea is a lifelong disease that must be managed with daily therapy. Untreated disease, when severe, may result in an increased risk for an array of problems from heart disease to mood changes, car accidents and shorter lifespan.    CPAP-  WHY AND HOW?                                    Continuous positive airway pressure, or CPAP, is the most effective treatment for obstructive sleep apnea. A decision to use CPAP is a major step forward in the pursuit of a healthier life. The successful use of CPAP will help you breathe easier, sleep better and live healthier. Using CPAP can be a positive experience if you keep these krishna points in mind:  1. Commitment  CPAP is not a quick fix for your problem. It involves a long-term commitment to improve your sleep and your health.    2. Communication  Stay in close communication with both your sleep doctor and your CPAP supplier. Ask lots of questions and seek help when you need it.    3. Consistency  Use CPAP all night, every night and for every nap. You will receive the maximum health benefits from CPAP when you use it every time that you sleep. This will also make it easier for your body to adjust to the treatment.    4. Correction  The first machine and mask that you try may not be the best ones for you. Work with your sleep doctor and your CPAP supplier to make corrections to your equipment selection. Ask about trying a different type of machine or mask if you have ongoing problems. Make sure that your mask is a good fit  "and learn to use your equipment properly.    5. Challenge  Tell a family member or close friend to ask you each morning if you used your CPAP the previous night. Have someone to challenge you to give it your best effort.    6. Connection   Your adjustment to CPAP will be easier if you are able to connect with others who use the same treatment. Ask your sleep doctor if there is a support group in your area for people who have sleep apnea, or look for one on the Internet.    7. Comfort   Increase your level of comfort by using a saline spray, decongestant or heated humidifier if CPAP irritates your nose, mouth or throat. Use your unit's \"ramp\" setting to slowly get used to the air pressure level. There may be soft pads you can buy that will fit over your mask straps. Look on www.CPAP.com for accessories such as these straps, a pillow contoured for side-sleeping with CPAP, longer hoses, hose covers to reduce condensation, or stands to keep the hose out of your way.                                                              8. Cleaning   Clean your mask, tubing and headgear on a regular basis. Put this time in your schedule so that you don't forget to do it. Check and replace the filters for your CPAP unit and humidifier.    9. Completion   Although you are never finished with CPAP therapy, you should reward yourself by celebrating the completion of your first month of treatment. Expect this first month to be your hardest period of adjustment. It will involve some trial and error as you find the machine, mask and pressure settings that are right for you.    10. Continuation  After your first month of treatment, continue to make a daily commitment to use your CPAP all night, every night and for every nap.    CPAP-Tips to starting with success:  Begin using your CPAP for short periods of time during the day while you watch TV or read.    Use CPAP every night and for every nap. Using it less often reduces the health " benefits and makes it harder for your body to get used to it.    Newer CPAP models are virtually silent; however, if you find the sound of your CPAP machine to be bothersome, place the unit under your bed to dampen the sound.     Make small adjustments to your mask, tubing, straps and headgear until you get the right fit. Tightening the mask may actually worsen the leak.  If it leaves significant marks on your face or irritates the bridge of your nose, it may not be the best mask for you.  Speak with the person who supplied the mask and consider trying other masks.    Use a saline nasal spray to ease mild nasal congestion. Neti-Pot or saline nasal rinses may also help. Nasal gel sprays can help reduce nasal dryness.  Biotene mouthwash can be helpful to protect your teeth if you experience frequent dry mouth.  Dry mouth may be a sign of air escaping out of your mouth or out of the mask in the case of a full face mask.  Speak with your provider if you expect that is the case.     Take a nasal decongestant to relieve more severe nasal or sinus congestion.  Do not use Afrin (oxymetazoline) nasal spray more than 3 days in a row.  Speak with your sleep doctor if your nasal congestion is chronic.    Use a heated humidifier that fits your CPAP model to enhance your breathing comfort. Adjust the heat setting up if you get a dry nose or throat, down if you get condensation in the hose or mask.  Position the CPAP lower than you so that any condensation in the hose drains back into the machine rather than towards the mask.    Try a system that uses nasal pillows if traditional masks give you problems.    Clean your mask, tubing and headgear once a week. Make sure the equipment dries fully.    Regularly check and replace the filters for your CPAP unit and humidifier.    Work closely with your sleep doctor and your CPAP supplier to make sure that you have the machine, mask and air pressure setting that works best for  "you.        MASK DESENSITIZATION:    If you are experiencing some anxiety about trying a PAP mask or breathing with pressurized air try the following steps in sequence to get used to PAP. This is called \"desensitization\".    1.  Wear mask (disconnected from the device) for 60 minutes daily awake and use a distraction: Sit and watch TV, read, or listen to music with the mask on. Take the mask off and put it back on, as needed. This can be in a chair in the living room, for example.    2.  When you can use the mask without taking it off for 60 minutes and without anxiety, then proceed to step 3.    3.  Attach the mask to the PAP device, and switch the unit  on  and practice breathing through the mask for one hour while watching television, reading or performing some other sedentary, distracting activity.     4.  Once you are comfortable wearing PAP for 30-60 minutes without anxiety while distracted with an activity, try to use it in bed. You can continue distraction in bed by watching TV, reading, listening to music or an audio book, etc.  The main goal is to  not think about using PAP  but pay attention to relaxing.    5. Use the PAP during scheduled one-hour naps at home.    6. Use PAP during initial 3-4 hours of nocturnal sleep.    7. Use PAP through an entire night of sleep.    Weight Loss:    Weight management is a personal decision.  If you are interested in exploring weight loss strategies, the following discussion covers the impact on weight loss on sleep apnea and the approaches that may be successful.    Weight loss decreases severity of sleep apnea in most people with obesity. For those with mild obesity who have developed snoring with weight gain, even 15-30 pound weight loss can improve and occasionally eliminate sleep apnea.  Structured and life-long dietary and health habits are necessary to lose weight and keep healthier weight levels.     Though there may be significant health benefits from weight " loss, long-term weight loss is very difficult to achieve- studies show success with dietary management in less than 10% of people. In addition, substantial weight loss may require years of dietary control and may be difficult if patients have severe obesity. In these cases, surgical management may be considered.  Finally, older individuals who have tolerated obesity without health complications may be less likely to benefit from weight loss strategies.    Your BMI is There is no height or weight on file to calculate BMI.  Body mass index (BMI) is one way to tell whether you are at a healthy weight, overweight, or obese. It measures your weight in relation to your height.  A BMI of 18.5 to 24.9 is in the healthy range. A person with a BMI of 25 to 29.9 is considered overweight, and someone with a BMI of 30 or greater is considered obese. More than two-thirds of American adults are considered overweight or obese.  Being overweight or obese increases the risk for further weight gain. Excess weight may lead to heart disease and diabetes.  Creating and following plans for healthy eating and physical activity may help you improve your health.  Weight control is part of healthy lifestyle and includes exercise, emotional health, and healthy eating habits. Careful eating habits lifelong are the mainstay of weight control. Though there are significant health benefits from weight loss, long-term weight loss with diet alone may be very difficult to achieve- studies show long-term success with dietary management in less than 10% of people. Attaining a healthy weight may be especially difficult to achieve in those with severe obesity. In some cases, medications, devices and surgical management might be considered.  What can you do?  If you are overweight or obese and are interested in methods for weight loss, you should discuss this with your provider.     Consider reducing daily calorie intake by 500 calories.     Keep a food  journal.     Avoiding skipping meals, consider cutting portions instead.    Diet combined with exercise helps maintain muscle while optimizing fat loss. Strength training is particularly important for building and maintaining muscle mass. Exercise helps reduce stress, increase energy, and improves fitness. Increasing exercise without diet control, however, may not burn enough calories to loose weight.       Start walking three days a week 10-20 minutes at a time    Work towards walking thirty minutes five days a week     Eventually, increase the speed of your walking for 1-2 minutes at time    In addition, we recommend that you review healthy lifestyles and methods for weight loss available through the National Institutes of Health patient information sites:  http://win.niddk.nih.gov/publications/index.htm    And look into health and wellness programs that may be available through your health insurance provider, employer, local community center, or eliana club.    Weight management plan: Patient was referred to their PCP to discuss a diet and exercise plan.     Your blood pressure was checked while you were in clinic today.  Please read the guidelines below about what these numbers mean and what you should do about them.  Your systolic blood pressure is the top number.  This is the pressure when the heart is pumping.  Your diastolic blood pressure is the bottom number.  This is the pressure in between beats.  If your systolic blood pressure is less than 120 and your diastolic blood pressure is less than 80, then your blood pressure is normal. There is nothing more that you need to do about it  If your systolic blood pressure is 120-139 or your diastolic blood pressure is 80-89, your blood pressure may be higher than it should be.  You should have your blood pressure re-checked within a year by a primary care provider.  If your systolic blood pressure is 140 or greater or your diastolic blood pressure is 90 or  "greater, you may have high blood pressure.  High blood pressure is treatable, but if left untreated over time it can put you at risk for heart attack, stroke, or kidney failure.  You should have your blood pressure re-checked by a primary care provider within the next four weeks.            Follow-ups after your visit        Your next 10 appointments already scheduled     Sep 28, 2017  2:30 PM CDT   Return Sleep Patient with Miguel Esposito MD   [facility] [place] [place]    [address] [address] [address] [phone]              Who to contact     If you have questions or need follow up information about today's clinic visit or your schedule please contact [facility] directly at [phone].  Normal or non-critical lab and imaging results will be communicated to you by MyChart, letter or phone within 4 business days after the clinic has received the results. If you do not hear from us within 7 days, please contact the clinic through MyChart or phone. If you have a critical or abnormal lab result, we will notify you by phone as soon as possible.  Submit refill requests through [facility] or call your pharmacy and they will forward the refill request to us. Please allow 3 business days for your refill to be completed.          Additional Information About Your Visit        [facility] Information     [facility] lets you send messages to your doctor, view your test results, renew your prescriptions, schedule appointments and more. To sign up, go to [email or web address] . Click on \"Log in\" on the left side of the screen, which will take you to the Welcome page. Then click on \"Sign up Now\" on the right side of the page.     You will be asked to enter the access code listed below, as well as some personal information. Please follow the directions to create your username and password.     Your access code is: " [email or web address]  Expires: 2017  2:34 PM     Your access code will  in 90 days. If you need help or a new code, please call your [facility] clinic or [phone].        Care EveryWhere ID     This is your Care EveryWhere ID. This could be used by other organizations to access your [facility] medical records  [id number]        Your Vitals Were     Pulse Respirations Height Pulse Oximetry BMI (Body Mass Index)       89 18 1.626 m (5' 4.02\") 97% 33.81 kg/m2        Blood Pressure from Last 3 Encounters:   17 128/81   17 139/86   17 168/82    Weight from Last 3 Encounters:   17 89.4 kg (197 lb 1.5 oz)   17 89.4 kg (197 lb)   17 93 kg (205 lb)              We Performed the Following     Comprehensive DME        Primary Care Provider Fax #    [facility] [phone]       No address on file        Thank you!     Thank you for [place] [facility]  [place]. Our goal is always to provide you with excellent care. Hearing back from our patients is one way we can continue to improve our services. Please take a few minutes to complete the written survey that you may receive in the mail after your visit with us. Thank you!             Your Updated Medication List - Protect others around you: Learn how to safely use, store and throw away your medicines at www.disposemymeds.org.          This list is accurate as of: 17  2:34 PM.  Always use your most recent med list.                   Brand Name Dispense Instructions for use    aspirin 81 MG tablet      Take  by mouth daily.       ferrous sulfate 325 (65 FE) MG tablet    IRON     Take by mouth daily (with breakfast)       finasteride 5 MG tablet    PROSCAR    90 tablet    Take 1 tablet (5 mg) by mouth daily       LIPITOR PO      Take 40 mg by mouth.       lisinopril-hydrochlorothiazide 20-12.5 MG per tablet    PRINZIDE/ZESTORETIC     Take 1 tablet by mouth daily       PRILOSEC PO      Take 20 " mg by mouth 2 times daily (before meals)

## 2017-04-25 NOTE — PATIENT INSTRUCTIONS
MY TREATMENT INFORMATION FOR SLEEP APNEA-  Rodney Hendrickson    MY CONTACT NUMBERS ARE:  [phone]  DOCTOR : Miguel WRIGHT [facility]  SLEEP CENTER :  [facility]  CPAP EQUIPMENT     You have sleep apnea, CPAP is prescribed for you.    You will be provided with an auto-titrating CPAP with a pressure range of 8-16 cmH2O with heated humidity to limit nasal congestion. Adjust the heat level on humidifier to find a setting that prevents dry nose but does not cause condensation in the hose or mask. Use distilled water in the humidifier.    The CPAP has a ramp function that starts the pressure lower than your prescribed pressure and gradually increases it over a number of minutes.  This may make it easier to fall asleep.                  Try to use the CPAP every-night, all night, at least 7-8 hours each night.  Daytime naps are not advised, but use CPAP if taking naps. Many insurances require that we prove you are using the CPAP at least 4 hours on at least 70% of nights over a 30 day period. We have 90 days to meet those criteria.       Patient was advised not to drive if drowsy or sleepy.                Discussed weight management and the impact of weight gain on sleep apnea.  Let me know if you snore or feel the pressure is too high.    You can get new supplies (mask, hose and filter) for your CPAP every 3-6 months, covered by insurance. You do not need to get supplies that often, but they are available if you would like them.  You may exchange the mask once within the first month if you feel the initial mask does not fit well.  Contact your medical equipment provider for equipment issues.  Please let me know if you have any return of snoring, daytime sleepiness or poor sleep quality. We will want to make sure your CPAP is adequately treating your apnea.  There is a website called CPAP.com that has accessories that may make CPAP use easier. Please visit it at your convenience.    Our phone number is [phone]    Follow-up  "4-6 weeks after PAP usage.  Bring your CPAP machine with you to the follow up appointment.      Frequently asked questions:  1. What is Obstructive Sleep Apnea (DOMINIK)? DOMINIK is the most common type of sleep apnea. Apnea literally means, \"without breath.\" It is characterized by repetitive pauses in breathing, despite continued effort to breathe, and is usually associated with a reduction in blood oxygen saturation. Apneas can last 10 to over 60 seconds. It is caused by narrowing or collapse of the upper airway as muscles relax during sleep. Severity of sleep apnea is determined by frequency of breathing events and their effect on your sleep and oxygen levels determined during sleep testing.   2. What are the consequences of DOMINIK? Symptoms include: daytime sleepiness- possibly increasing the risk of falling asleep while driving, unrefreshing/restless sleep, snoring, insomnia, waking frequently to urinate, waking with heartburn or reflux, reduced concentration and memory, and morning headaches. Other health consequences may include development of high blood pressure and other cardiovascular disease in persons who are susceptible. Untreated DOMINIK  can contribute to heart disease, stroke and diabetes.   3. What are the treatment options? In most situations, sleep apnea is a lifelong disease that must be managed with daily therapy. Medications are not effective for sleep apnea and surgery is generally not performed until other therapies have been tried. Therapy is usually tailored to the individual patient based on many factors including your wishes as well as severity of sleep apnea and severity of obesity. Continuous Positive Airway (CPAP) is the most reliable treatment. An oral device to hold your jaw forward is usually      IF I HAVE SLEEP APNEA.....  WHERE CAN I FIND MORE INFORMATION?    American Academy of Sleep Medicine Patient information on sleep disorders:  http://yoursleep.aasmnet.org    THINGS I SHOULD REMEMBER  In " most situations, sleep apnea is a lifelong disease that must be managed with daily therapy. Untreated disease, when severe, may result in an increased risk for an array of problems from heart disease to mood changes, car accidents and shorter lifespan.    CPAP-  WHY AND HOW?                                    Continuous positive airway pressure, or CPAP, is the most effective treatment for obstructive sleep apnea. A decision to use CPAP is a major step forward in the pursuit of a healthier life. The successful use of CPAP will help you breathe easier, sleep better and live healthier. Using CPAP can be a positive experience if you keep these krishna points in mind:  1. Commitment  CPAP is not a quick fix for your problem. It involves a long-term commitment to improve your sleep and your health.    2. Communication  Stay in close communication with both your sleep doctor and your CPAP supplier. Ask lots of questions and seek help when you need it.    3. Consistency  Use CPAP all night, every night and for every nap. You will receive the maximum health benefits from CPAP when you use it every time that you sleep. This will also make it easier for your body to adjust to the treatment.    4. Correction  The first machine and mask that you try may not be the best ones for you. Work with your sleep doctor and your CPAP supplier to make corrections to your equipment selection. Ask about trying a different type of machine or mask if you have ongoing problems. Make sure that your mask is a good fit and learn to use your equipment properly.    5. Challenge  Tell a family member or close friend to ask you each morning if you used your CPAP the previous night. Have someone to challenge you to give it your best effort.    6. Connection   Your adjustment to CPAP will be easier if you are able to connect with others who use the same treatment. Ask your sleep doctor if there is a support group in your area for people who have sleep  "apnea, or look for one on the Internet.    7. Comfort   Increase your level of comfort by using a saline spray, decongestant or heated humidifier if CPAP irritates your nose, mouth or throat. Use your unit's \"ramp\" setting to slowly get used to the air pressure level. There may be soft pads you can buy that will fit over your mask straps. Look on www.CPAP.com for accessories such as these straps, a pillow contoured for side-sleeping with CPAP, longer hoses, hose covers to reduce condensation, or stands to keep the hose out of your way.                                                              8. Cleaning   Clean your mask, tubing and headgear on a regular basis. Put this time in your schedule so that you don't forget to do it. Check and replace the filters for your CPAP unit and humidifier.    9. Completion   Although you are never finished with CPAP therapy, you should reward yourself by celebrating the completion of your first month of treatment. Expect this first month to be your hardest period of adjustment. It will involve some trial and error as you find the machine, mask and pressure settings that are right for you.    10. Continuation  After your first month of treatment, continue to make a daily commitment to use your CPAP all night, every night and for every nap.    CPAP-Tips to starting with success:  Begin using your CPAP for short periods of time during the day while you watch TV or read.    Use CPAP every night and for every nap. Using it less often reduces the health benefits and makes it harder for your body to get used to it.    Newer CPAP models are virtually silent; however, if you find the sound of your CPAP machine to be bothersome, place the unit under your bed to dampen the sound.     Make small adjustments to your mask, tubing, straps and headgear until you get the right fit. Tightening the mask may actually worsen the leak.  If it leaves significant marks on your face or irritates the " "bridge of your nose, it may not be the best mask for you.  Speak with the person who supplied the mask and consider trying other masks.    Use a saline nasal spray to ease mild nasal congestion. Neti-Pot or saline nasal rinses may also help. Nasal gel sprays can help reduce nasal dryness.  Biotene mouthwash can be helpful to protect your teeth if you experience frequent dry mouth.  Dry mouth may be a sign of air escaping out of your mouth or out of the mask in the case of a full face mask.  Speak with your provider if you expect that is the case.     Take a nasal decongestant to relieve more severe nasal or sinus congestion.  Do not use Afrin (oxymetazoline) nasal spray more than 3 days in a row.  Speak with your sleep doctor if your nasal congestion is chronic.    Use a heated humidifier that fits your CPAP model to enhance your breathing comfort. Adjust the heat setting up if you get a dry nose or throat, down if you get condensation in the hose or mask.  Position the CPAP lower than you so that any condensation in the hose drains back into the machine rather than towards the mask.    Try a system that uses nasal pillows if traditional masks give you problems.    Clean your mask, tubing and headgear once a week. Make sure the equipment dries fully.    Regularly check and replace the filters for your CPAP unit and humidifier.    Work closely with your sleep doctor and your CPAP supplier to make sure that you have the machine, mask and air pressure setting that works best for you.        MASK DESENSITIZATION:    If you are experiencing some anxiety about trying a PAP mask or breathing with pressurized air try the following steps in sequence to get used to PAP. This is called \"desensitization\".    1.  Wear mask (disconnected from the device) for 60 minutes daily awake and use a distraction: Sit and watch TV, read, or listen to music with the mask on. Take the mask off and put it back on, as needed. This can be in a " chair in the living room, for example.    2.  When you can use the mask without taking it off for 60 minutes and without anxiety, then proceed to step 3.    3.  Attach the mask to the PAP device, and switch the unit  on  and practice breathing through the mask for one hour while watching television, reading or performing some other sedentary, distracting activity.     4.  Once you are comfortable wearing PAP for 30-60 minutes without anxiety while distracted with an activity, try to use it in bed. You can continue distraction in bed by watching TV, reading, listening to music or an audio book, etc.  The main goal is to  not think about using PAP  but pay attention to relaxing.    5. Use the PAP during scheduled one-hour naps at home.    6. Use PAP during initial 3-4 hours of nocturnal sleep.    7. Use PAP through an entire night of sleep.    Weight Loss:    Weight management is a personal decision.  If you are interested in exploring weight loss strategies, the following discussion covers the impact on weight loss on sleep apnea and the approaches that may be successful.    Weight loss decreases severity of sleep apnea in most people with obesity. For those with mild obesity who have developed snoring with weight gain, even 15-30 pound weight loss can improve and occasionally eliminate sleep apnea.  Structured and life-long dietary and health habits are necessary to lose weight and keep healthier weight levels.     Though there may be significant health benefits from weight loss, long-term weight loss is very difficult to achieve- studies show success with dietary management in less than 10% of people. In addition, substantial weight loss may require years of dietary control and may be difficult if patients have severe obesity. In these cases, surgical management may be considered.  Finally, older individuals who have tolerated obesity without health complications may be less likely to benefit from weight loss  strategies.    Your BMI is There is no height or weight on file to calculate BMI.  Body mass index (BMI) is one way to tell whether you are at a healthy weight, overweight, or obese. It measures your weight in relation to your height.  A BMI of 18.5 to 24.9 is in the healthy range. A person with a BMI of 25 to 29.9 is considered overweight, and someone with a BMI of 30 or greater is considered obese. More than two-thirds of American adults are considered overweight or obese.  Being overweight or obese increases the risk for further weight gain. Excess weight may lead to heart disease and diabetes.  Creating and following plans for healthy eating and physical activity may help you improve your health.  Weight control is part of healthy lifestyle and includes exercise, emotional health, and healthy eating habits. Careful eating habits lifelong are the mainstay of weight control. Though there are significant health benefits from weight loss, long-term weight loss with diet alone may be very difficult to achieve- studies show long-term success with dietary management in less than 10% of people. Attaining a healthy weight may be especially difficult to achieve in those with severe obesity. In some cases, medications, devices and surgical management might be considered.  What can you do?  If you are overweight or obese and are interested in methods for weight loss, you should discuss this with your provider.     Consider reducing daily calorie intake by 500 calories.     Keep a food journal.     Avoiding skipping meals, consider cutting portions instead.    Diet combined with exercise helps maintain muscle while optimizing fat loss. Strength training is particularly important for building and maintaining muscle mass. Exercise helps reduce stress, increase energy, and improves fitness. Increasing exercise without diet control, however, may not burn enough calories to loose weight.       Start walking three days a week  10-20 minutes at a time    Work towards walking thirty minutes five days a week     Eventually, increase the speed of your walking for 1-2 minutes at time    In addition, we recommend that you review healthy lifestyles and methods for weight loss available through the National Institutes of Health patient information sites:  http://win.niddk.nih.gov/publications/index.htm    And look into health and wellness programs that may be available through your health insurance provider, employer, local community center, or eliana club.    Weight management plan: Patient was referred to their PCP to discuss a diet and exercise plan.     Your blood pressure was checked while you were in clinic today.  Please read the guidelines below about what these numbers mean and what you should do about them.  Your systolic blood pressure is the top number.  This is the pressure when the heart is pumping.  Your diastolic blood pressure is the bottom number.  This is the pressure in between beats.  If your systolic blood pressure is less than 120 and your diastolic blood pressure is less than 80, then your blood pressure is normal. There is nothing more that you need to do about it  If your systolic blood pressure is 120-139 or your diastolic blood pressure is 80-89, your blood pressure may be higher than it should be.  You should have your blood pressure re-checked within a year by a primary care provider.  If your systolic blood pressure is 140 or greater or your diastolic blood pressure is 90 or greater, you may have high blood pressure.  High blood pressure is treatable, but if left untreated over time it can put you at risk for heart attack, stroke, or kidney failure.  You should have your blood pressure re-checked by a primary care provider within the next four weeks.

## 2017-04-25 NOTE — PROGRESS NOTES
Sleep Study Follow-Up Visit:    Date on this visit: 4/25/2017    ASSESSMENT / PLAN:     DOMINIK (obstructive sleep apnea)  Sleep related hypoxia    Discussed with patient the recent sleep study and treatment options, we recommend Auto-PAP 8-16 cmH2O in addition to weight loss and avoiding sleeping supine position. Patient is recommended to improve his sleep-wake schedule and good sleep hygiene. Patient was advised to use PAP therapy for at least 7-8 hours and during naps (naps are not recommended).  Instructions given. Follow up 4-6 weeks after PAP use.    Obesity:  Counseled regarding weight loss through diet modification and increased physical activity. Patient was given instuctions of weight loss and advised to follow up her PCP for further weight loss interventions.        All questions were answered.  The patient indicates understanding of the above issues and agrees with the plan set forth.    Orders Placed This Encounter   Procedures     Comprehensive DME       He will follow up with me in about 4-6 week(s).       BRIEF SUMMARY:    Rodney Hendrickson is a 71 year old male with history of hypertension, hyperlipidemia, GERD and BPH comes in today for follow-up of his sleep study done on 4/12/17 at the [facility] for result of sleep study.    Home sleep study: 4/12/17   AHI 25.5/hr (supine 65.8/hr due to limited time of supine sleep)  INDER 16.8/hr  Snore index 36.1%  Lowest O 2 saturation is 71%  Time spent O 2 saturation below 88% was 16.1 minutes    These findings were reviewed with the patient and copy of the sleep study result was given.    Comorbidities include: hypertension, hyperlipidemia, GERD and BPH    Past medical/surgical history, family history, social history, medications and allergies were reviewed.      Problem List:  Patient Active Problem List    Diagnosis Date Noted     Benign non-nodular prostatic hyperplasia with lower urinary tract symptoms 03/15/2017     Priority: Medium     Morbid  "obesity due to excess calories (H) 03/15/2017     Priority: Medium     Hyperlipidemia LDL goal <130      Priority: Medium     Hypertension      Priority: Medium             Medications:     Current Outpatient Prescriptions   Medication Sig     ferrous sulfate (IRON) 325 (65 FE) MG tablet Take by mouth daily (with breakfast)     finasteride (PROSCAR) 5 MG tablet Take 1 tablet (5 mg) by mouth daily     lisinopril-hydrochlorothiazide (PRINZIDE,ZESTORETIC) 20-12.5 MG per tablet Take 1 tablet by mouth daily     Omeprazole (PRILOSEC PO) Take 20 mg by mouth 2 times daily (before meals)      Atorvastatin Calcium (LIPITOR PO) Take 40 mg by mouth.     aspirin 81 MG tablet Take  by mouth daily.     No current facility-administered medications for this visit.      Facility-Administered Medications Ordered in Other Visits   Medication     benzocaine (HURRICAINE/TOPEX) 20 % spray          PHYSICAL EXAMINATION:  /81  Pulse 89  Resp 18  Ht 1.626 m (5' 4.02\")  Wt 89.4 kg (197 lb 1.5 oz)  SpO2 97%  BMI 33.81 kg/m2          Data: All pertinent previous laboratory data reviewed     No results found for: PH, PHARTERIAL, PO2, [id number], SAT, PCO2, HCO3, BASEEXCESS, RITCHIE, BEB  No results found for: TSH  Lab Results   Component Value Date     (A) 05/11/2016     No results found for: HGB  Lab Results   Component Value Date    CR 1.4 (A) 05/11/2016     No results found for: SHARRI     Twenty-five minutes spent with patient, all of which were spent face-to-face counseling, consulting, coordinating plan of care, going over sleep test results and chart review.          Miguel Esposito MD 4/25/2017   [facility]  [address], [place] [address]   [phone] Clinic      Copy to: [facility]    "

## 2017-04-25 NOTE — NURSING NOTE
"Chief Complaint   Patient presents with     RECHECK     f/u Hst 4/12       Initial /81  Pulse 89  Resp 18  Ht 1.626 m (5' 4.02\")  Wt 89.4 kg (197 lb 1.5 oz)  SpO2 97%  BMI 33.81 kg/m2 Estimated body mass index is 33.81 kg/(m^2) as calculated from the following:    Height as of this encounter: 1.626 m (5' 4.02\").    Weight as of this encounter: 89.4 kg (197 lb 1.5 oz).  Medication Reconciliation: complete         Jasmin Buenrostro LPN/MA  "

## 2017-05-10 ENCOUNTER — DOCUMENTATION ONLY (OUTPATIENT)
Dept: SLEEP MEDICINE | Facility: CLINIC | Age: 72
End: 2017-05-10

## 2017-05-10 NOTE — PROGRESS NOTES
Rodney Hendrickson was set up with PAP equipment by [facility] and is enrolled in [facility].  See previous documentation by [facility] for equipment and supply details.

## 2017-05-10 NOTE — PROGRESS NOTES
Patient was offered choice of vendor and chose [facility].  Patient Rodney Hendrickson was set up at [facility] on May 2, 2017. Patient received a Resmed AirSense 10 Auto. Pressures were set at 8-16 cm H2O.   Patient s ramp is 5 cm H2O for 15 min and FLEX/EPR is EPR.  Patient received a [employer] Mask name: ESON 2  Nasal mask Size Large, heated tubing and heated humidifier.  Patient is enrolled in the STM Program and does need to meet compliance. Patient has a follow up on 06/05/2017 with Dr. Esposito.    Alison Muhammad

## 2017-05-15 ENCOUNTER — DOCUMENTATION ONLY (OUTPATIENT)
Dept: SLEEP MEDICINE | Facility: CLINIC | Age: 72
End: 2017-05-15

## 2017-05-15 NOTE — PROGRESS NOTES
3 DAY STM VISIT    Patient contacted for 3 day STM visit  Subjective measures:  Pt states things are going well and has no issues or complaints.  Pt is benefiting from therapy.    Current settings:  EPAP Min Auto CPAP: 8.0 (The minimum allowable pressure in cmH2O)       EPAP Max Auto CPAP: 16.0 (The maximum allowable pressure in cmH2O)       Assessment: NIghtly usage over four hours.  Action plan: Pt to have f/u 14 day STM visit.

## 2017-05-23 ENCOUNTER — OFFICE VISIT (OUTPATIENT)
Dept: INTERNAL MEDICINE | Facility: CLINIC | Age: 72
End: 2017-05-23
Payer: COMMERCIAL

## 2017-05-23 VITALS
TEMPERATURE: 97.4 F | HEART RATE: 80 BPM | DIASTOLIC BLOOD PRESSURE: 82 MMHG | WEIGHT: 206 LBS | HEIGHT: 64 IN | SYSTOLIC BLOOD PRESSURE: 132 MMHG | BODY MASS INDEX: 35.17 KG/M2 | OXYGEN SATURATION: 99 %

## 2017-05-23 DIAGNOSIS — I10 ESSENTIAL HYPERTENSION: ICD-10-CM

## 2017-05-23 DIAGNOSIS — E66.01 MORBID OBESITY DUE TO EXCESS CALORIES (H): ICD-10-CM

## 2017-05-23 DIAGNOSIS — G47.33 OSA (OBSTRUCTIVE SLEEP APNEA): ICD-10-CM

## 2017-05-23 DIAGNOSIS — Z00.00 ROUTINE GENERAL MEDICAL EXAMINATION AT A HEALTH CARE FACILITY: Primary | ICD-10-CM

## 2017-05-23 DIAGNOSIS — Z12.5 SCREENING FOR PROSTATE CANCER: ICD-10-CM

## 2017-05-23 DIAGNOSIS — R73.9 HYPERGLYCEMIA: ICD-10-CM

## 2017-05-23 DIAGNOSIS — E78.5 HYPERLIPIDEMIA LDL GOAL <130: ICD-10-CM

## 2017-05-23 DIAGNOSIS — Z23 NEED FOR VACCINATION WITH 13-POLYVALENT PNEUMOCOCCAL CONJUGATE VACCINE: ICD-10-CM

## 2017-05-23 LAB
ALBUMIN UR-MCNC: NEGATIVE MG/DL
APPEARANCE UR: CLEAR
BILIRUB UR QL STRIP: NEGATIVE
COLOR UR AUTO: YELLOW
ERYTHROCYTE [DISTWIDTH] IN BLOOD BY AUTOMATED COUNT: 13.4 % (ref 10–15)
GLUCOSE UR STRIP-MCNC: NEGATIVE MG/DL
HBA1C MFR BLD: 6.1 % (ref 4.3–6)
HCT VFR BLD AUTO: 41.3 % (ref 40–53)
HGB BLD-MCNC: 13.7 G/DL (ref 13.3–17.7)
HGB UR QL STRIP: NEGATIVE
KETONES UR STRIP-MCNC: NEGATIVE MG/DL
LEUKOCYTE ESTERASE UR QL STRIP: NEGATIVE
MCH RBC QN AUTO: 30.9 PG (ref 26.5–33)
MCHC RBC AUTO-ENTMCNC: 33.2 G/DL (ref 31.5–36.5)
MCV RBC AUTO: 93 FL (ref 78–100)
NITRATE UR QL: NEGATIVE
PH UR STRIP: 5.5 PH (ref 5–7)
PLATELET # BLD AUTO: 183 10E9/L (ref 150–450)
RBC # BLD AUTO: 4.43 10E12/L (ref 4.4–5.9)
SP GR UR STRIP: 1.01 (ref 1–1.03)
URN SPEC COLLECT METH UR: NORMAL
UROBILINOGEN UR STRIP-ACNC: 0.2 EU/DL (ref 0.2–1)
WBC # BLD AUTO: 5.6 10E9/L (ref 4–11)

## 2017-05-23 PROCEDURE — 90670 PCV13 VACCINE IM: CPT | Performed by: INTERNAL MEDICINE

## 2017-05-23 PROCEDURE — G0438 PPPS, INITIAL VISIT: HCPCS | Performed by: INTERNAL MEDICINE

## 2017-05-23 PROCEDURE — 83036 HEMOGLOBIN GLYCOSYLATED A1C: CPT | Performed by: INTERNAL MEDICINE

## 2017-05-23 PROCEDURE — 80053 COMPREHEN METABOLIC PANEL: CPT | Performed by: INTERNAL MEDICINE

## 2017-05-23 PROCEDURE — 36415 COLL VENOUS BLD VENIPUNCTURE: CPT | Performed by: INTERNAL MEDICINE

## 2017-05-23 PROCEDURE — 84443 ASSAY THYROID STIM HORMONE: CPT | Performed by: INTERNAL MEDICINE

## 2017-05-23 PROCEDURE — 81003 URINALYSIS AUTO W/O SCOPE: CPT | Performed by: INTERNAL MEDICINE

## 2017-05-23 PROCEDURE — 85027 COMPLETE CBC AUTOMATED: CPT | Performed by: INTERNAL MEDICINE

## 2017-05-23 PROCEDURE — G0103 PSA SCREENING: HCPCS | Performed by: INTERNAL MEDICINE

## 2017-05-23 PROCEDURE — G0009 ADMIN PNEUMOCOCCAL VACCINE: HCPCS | Performed by: INTERNAL MEDICINE

## 2017-05-23 PROCEDURE — 80061 LIPID PANEL: CPT | Performed by: INTERNAL MEDICINE

## 2017-05-23 NOTE — NURSING NOTE
"Chief Complaint   Patient presents with     Wellness Visit     Fasting Px       Initial /82  Pulse 80  Temp 97.4  F (36.3  C) (Oral)  Ht 5' 4\" (1.626 m)  Wt 206 lb (93.4 kg)  SpO2 99%  BMI 35.36 kg/m2 Estimated body mass index is 35.36 kg/(m^2) as calculated from the following:    Height as of this encounter: 5' 4\" (1.626 m).    Weight as of this encounter: 206 lb (93.4 kg).  Medication Reconciliation: complete   Deyanira Mackey MA      "

## 2017-05-23 NOTE — PROGRESS NOTES
SUBJECTIVE:                                                            Rodney Hendrickson is a 71 year old male who presents for Preventive Visit.    Are you in the first 12 months of your Medicare Part B coverage?  No    Healthy Habits:    Do you get at least three servings of calcium containing foods daily (dairy, green leafy vegetables, etc.)? Yes, mostly dairy    Amount of exercise or daily activities, outside of work: just few times a week, walking    Problems taking medications regularly No    Medication side effects: No    Have you had an eye exam in the past two years? yes    Do you see a dentist twice per year? yes    Do you have sleep apnea, excessive snoring or daytime drowsiness?yes, using C-Pap machine    COGNITIVE SCREEN  1) Repeat 3 items (Banana, Sunrise, Chair)    2) Clock draw: NORMAL  3) 3 item recall: Recalls 3 objects  Results: 3 items recalled: COGNITIVE IMPAIRMENT LESS LIKELY    Mini-CogTM Copyright S Leo. Licensed by the author for use in [facility]; reprinted with permission ([email or web address]). All rights reserved.            PROBLEMS TO ADD ON...    Has h/o HTN. on medical treatment. BP has been controlled. No side effects from medications. No CP, HA, dizziness. good compliance with medications and low salt diet.  Has H/O hyperlipidemia. On medical treatment and diet. No side effects. No muscle weakness, myalgias or upset stomach.   Has H/O BPH. On treatment with Proscar . Has mild symptoms of frequency, decreased urinary stream , no hematuria or dysuria. No side effects from medications.  Has DOMINIK, started using CPAP. Improved sleep and energy.   Has h/o obesity. Discussed diet and exercise.     Reviewed and updated as needed this visit by clinical staff  Tobacco  Allergies  Meds  Med Hx  Surg Hx  Fam Hx  Soc Hx        Reviewed and updated as needed this visit by Provider        Social History   Substance Use Topics     Smoking status: Former Smoker     Quit date: 4/30/1983      Smokeless tobacco: Not on file     Alcohol use Yes      Comment: occa       The patient does not drink >3 drinks per day nor >7 drinks per week.    Today's PHQ-2 Score: 0  PHQ-2 ( 1999 Pfizer) 5/23/2017 3/14/2017   Q1: Little interest or pleasure in doing things 0 0   Q2: Feeling down, depressed or hopeless 0 0   PHQ-2 Score 0 0       Do you feel safe in your environment - Yes    Do you have a Health Care Directive?: Yes: Patient states has Advance Directive and will bring in a copy to clinic.    Current providers sharing in care for this patient include:   Patient Care Team:  [facility] as PCP - General (Family Practice)      Hearing impairment: Yes, Difficulty following a conversation in a noisy restaurant or crowded room.    Ability to successfully perform activities of daily living: Yes, no assistance needed     Fall risk:  Fallen 2 or more times in the past year?: No  Any fall with injury in the past year?: No    Home safety:  none identified      The following health maintenance items are reviewed in Epic and correct as of today:  Health Maintenance   Topic Date Due     ADVANCE DIRECTIVE PLANNING Q5 YRS (NO INBASKET)  09/06/1963     HEPATITIS C SCREENING  09/06/1963     FALL RISK ASSESSMENT  09/06/2010     AORTIC ANEURYSM SCREENING (SYSTEM ASSIGNED)  09/06/2010     PNEUMOCOCCAL (2 of 2 - PCV13) 02/28/2012     INFLUENZA VACCINE (SYSTEM ASSIGNED)  09/01/2017     LIPID SCREEN Q5 YR MALE (SYSTEM ASSIGNED)  05/11/2021     TETANUS IMMUNIZATION (SYSTEM ASSIGNED)  09/07/2022     COLON CANCER SCREEN (SYSTEM ASSIGNED)  01/18/2027         Pneumonia Vaccine:Adults age 65+ who received Pneumovax (PPSV23) at 65 years or older: Should be given PCV13 > 1 year after their most recent PPSV23     ROS:  C: NEGATIVE for fever, chills, change in weight  I: NEGATIVE for worrisome rashes, moles or lesions  E: NEGATIVE for vision changes or irritation  E/M: NEGATIVE for ear, mouth and throat problems  R: NEGATIVE for  "significant cough or SOB  B: NEGATIVE for masses, tenderness or discharge  CV: NEGATIVE for chest pain, palpitations or peripheral edema  GI: NEGATIVE for nausea, abdominal pain, heartburn, or change in bowel habits  : NEGATIVE for frequency, dysuria, or hematuria  M: NEGATIVE for significant arthralgias or myalgia  N: NEGATIVE for weakness, dizziness or paresthesias  E: NEGATIVE for temperature intolerance, skin/hair changes  H: NEGATIVE for bleeding problems  P: NEGATIVE for changes in mood or affect    Problem list, Medication list, Allergies, and Medical/Social/Surgical histories reviewed in [facility] and updated as appropriate.  OBJECTIVE:                                                            /82  Pulse 80  Temp 97.4  F (36.3  C) (Oral)  Ht 5' 4\" (1.626 m)  Wt 206 lb (93.4 kg)  SpO2 99%  BMI 35.36 kg/m2 Estimated body mass index is 35.36 kg/(m^2) as calculated from the following:    Height as of this encounter: 5' 4\" (1.626 m).    Weight as of this encounter: 206 lb (93.4 kg).  EXAM:   GENERAL: obese, alert and no distress  EYES: Eyes grossly normal to inspection, PERRL and conjunctivae and sclerae normal  HENT: ear canals and TM's normal, nose and mouth without ulcers or lesions  NECK: no adenopathy, no asymmetry, masses, or scars and thyroid normal to palpation  RESP: lungs clear to auscultation - no rales, rhonchi or wheezes  CV: regular rate and rhythm, normal S1 S2, no S3 or S4, no murmur, click or rub, no peripheral edema and peripheral pulses strong  ABDOMEN: soft, nontender, no hepatosplenomegaly, no masses and bowel sounds normal  MS: no gross musculoskeletal defects noted, no edema  SKIN: no suspicious lesions or rashes  NEURO: Normal strength and tone, mentation intact and speech normal  PSYCH: mentation appears normal, affect normal/bright    ASSESSMENT / PLAN:                                                                ICD-10-CM    1. Routine general medical examination at [facility] " "care facility Z00.00 Lipid panel reflex to direct LDL     CBC with platelets     Comprehensive metabolic panel     TSH with free T4 reflex     Prostate spec antigen screen     *UA reflex to Microscopic and Culture ([facility] (except [place] and [place])     Hemoglobin A1c   2. DOMINIK (obstructive sleep apnea) G47.33    3. Essential hypertension I10 CBC with platelets     Comprehensive metabolic panel     TSH with free T4 reflex     *UA reflex to Microscopic and Culture ([facility] (except [place] and [place])   4. Hyperlipidemia LDL goal <130 E78.5 Lipid panel reflex to direct LDL   5. Hyperglycemia R73.9 Hemoglobin A1c   6. Screening for prostate cancer Z12.5 Prostate spec antigen screen       End of Life Planning:  Patient currently has an advanced directive: Yes.  Practitioner is supportive of decision.    COUNSELING:  Reviewed preventive health counseling, as reflected in patient instructions       Regular exercise       Healthy diet/nutrition       Vision screening       Hearing screening       Dental care       Colon cancer screening       Prostate cancer screening        Estimated body mass index is 35.36 kg/(m^2) as calculated from the following:    Height as of this encounter: 5' 4\" (1.626 m).    Weight as of this encounter: 206 lb (93.4 kg).  Weight management plan: Discussed healthy diet and exercise guidelines and patient will follow up in 12 months in clinic to re-evaluate.   reports that he quit smoking about 34 years ago. He does not have any smokeless tobacco history on file.      Appropriate preventive services were discussed with this patient, including applicable screening as appropriate for cardiovascular disease, diabetes, osteopenia/osteoporosis, and glaucoma.  As appropriate for age/gender, discussed screening for colorectal cancer, prostate cancer, breast cancer, and cervical cancer. Checklist reviewing preventive services available has been given to the " patient.    Reviewed patients plan of care and provided an AVS. The Intermediate Care Plan ( asthma action plan, low back pain action plan, and migraine action plan) for Rodney meets the Care Plan requirement. This Care Plan has been established and reviewed with the Patient.    Counseling Resources:  ATP IV Guidelines  Pooled Cohorts Equation Calculator  Breast Cancer Risk Calculator  FRAX Risk Assessment  ICSI Preventive Guidelines  Dietary Guidelines for Americans, 2010  USDA's MyPlate  ASA Prophylaxis  Lung CA Screening    Brennan Mercado MD  [facility]

## 2017-05-23 NOTE — MR AVS SNAPSHOT
After Visit Summary   5/23/2017    Rodney Hendrickson    MRN: [id number]           Patient Information     Date Of Birth          1945        Visit Information        Provider Department      5/23/2017 11:20 AM Brennan Mercado MD [facility]        Today's Diagnoses     Routine general medical examination at a health care facility    -  1    DOMINIK (obstructive sleep apnea)        Essential hypertension        Hyperlipidemia LDL goal <130        Hyperglycemia        Screening for prostate cancer        Morbid obesity due to excess calories (H)        Need for vaccination with 13-polyvalent pneumococcal conjugate vaccine          Care Instructions      Preventive Health Recommendations:       Male Ages 65 and over    Yearly exam:             See your health care provider every year in order to  o   Review health changes.   o   Discuss preventive care.    o   Review your medicines if your doctor has prescribed any.    Talk with your health care provider about whether you should have a test to screen for prostate cancer (PSA).    Every 3 years, have a diabetes test (fasting glucose). If you are at risk for diabetes, you should have this test more often.    Every 5 years, have a cholesterol test. Have this test more often if you are at risk for high cholesterol or heart disease.     Every 10 years, have a colonoscopy. Or, have a yearly FIT test (stool test). These exams will check for colon cancer.    Talk to with your health care provider about screening for Abdominal Aortic Aneurysm if you have a family history of AAA or have a history of smoking.  Shots:     Get a flu shot each year.     Get a tetanus shot every 10 years.     Talk to your doctor about your pneumonia vaccines. There are now two you should receive - Pneumovax (PPSV 23) and Prevnar (PCV 13).    Talk to your doctor about a shingles vaccine.     Talk to your doctor about the hepatitis B vaccine.  Nutrition:     Eat at least  5 servings of fruits and vegetables each day.     Eat whole-grain bread, whole-wheat pasta and brown rice instead of white grains and rice.     Talk to your doctor about Calcium and Vitamin D.   Lifestyle    Exercise for at least 150 minutes a week (30 minutes a day, 5 days a week). This will help you control your weight and prevent disease.     Limit alcohol to one drink per day.     No smoking.     Wear sunscreen to prevent skin cancer.     See your dentist every six months for an exam and cleaning.     See your eye doctor every 1 to 2 years to screen for conditions such as glaucoma, macular degeneration and cataracts.        Follow-ups after your visit        Follow-up notes from your care team     Return in about 1 year (around 5/23/2018).      Your next 10 appointments already scheduled     Jun 05, 2017  9:30 AM CDT   Return Sleep Patient with Miguel Esposito MD   [facility] ([facility])    [address] [place] [id number]   [phone]            Sep 28, 2017  2:30 PM CDT   Return Sleep Patient with Miguel Esposito MD   [facility] ([facility])    [address] [place] [id number]   [phone]              Who to contact     If you have questions or need follow up information about today's clinic visit or your schedule please contact [facility] directly at [phone].  Normal or non-critical lab and imaging results will be communicated to you by MyChart, letter or phone within 4 business days after the clinic has received the results. If you do not hear from us within 7 days, please contact the clinic through MyChart or phone. If you have a critical or abnormal lab result, we will notify you by phone as soon as possible.  Submit refill requests through [email or web address] or call your pharmacy and they will forward the refill request to us. Please allow 3  "business days for your refill to be completed.          Additional Information About Your Visit        MyChart Information     [facility] lets you send messages to your doctor, view your test results, renew your prescriptions, schedule appointments and more. To sign up, go to [email or web address] . Click on \"Log in\" on the left side of the screen, which will take you to the Welcome page. Then click on \"Sign up Now\" on the right side of the page.     You will be asked to enter the access code listed below, as well as some personal information. Please follow the directions to create your username and password.     Your access code is: [email or web address]  Expires: 2017  2:34 PM     Your access code will  in 90 days. If you need help or a new code, please call your [facility] clinic or [phone].        Care EveryWhere ID     This is your Care EveryWhere ID. This could be used by other organizations to access your [facility] medical records  [id number]        Your Vitals Were     Pulse Temperature Height Pulse Oximetry BMI (Body Mass Index)       80 97.4  F (36.3  C) (Oral) 5' 4\" (1.626 m) 99% 35.36 kg/m2        Blood Pressure from Last 3 Encounters:   17 132/82   17 128/81   17 139/86    Weight from Last 3 Encounters:   17 206 lb (93.4 kg)   17 197 lb 1.5 oz (89.4 kg)   17 197 lb (89.4 kg)              We Performed the Following     *UA reflex to Microscopic and Culture ([place] [facility] (except [place] and [place])     ADMIN: Vaccine, Initial (22947)     CBC with platelets     Comprehensive metabolic panel     Hemoglobin A1c     Lipid panel reflex to direct LDL     Pneumococcal vaccine 13 valent PCV13 IM (Prevnar) [90701]     Prostate spec antigen screen     TSH with free T4 reflex        Primary Care Provider Fax #    [facility] [phone]       No address on file        Thank you!     Thank you for choosing [facility]  for your " care. Our goal is always to provide you with excellent care. Hearing back from our patients is one way we can continue to improve our services. Please take a few minutes to complete the written survey that you may receive in the mail after your visit with us. Thank you!             Your Updated Medication List - Protect others around you: Learn how to safely use, store and throw away your medicines at www.disposemymeds.org.          This list is accurate as of: 5/23/17  1:55 PM.  Always use your most recent med list.                   Brand Name Dispense Instructions for use    aspirin 81 MG tablet      Take  by mouth daily.       ferrous sulfate 325 (65 FE) MG tablet    IRON     Take by mouth daily (with breakfast)       finasteride 5 MG tablet    PROSCAR    90 tablet    Take 1 tablet (5 mg) by mouth daily       LIPITOR PO      Take 40 mg by mouth.       lisinopril-hydrochlorothiazide 20-12.5 MG per tablet    PRINZIDE/ZESTORETIC     Take 1 tablet by mouth daily       order for DME      Equipment ordered: RESMED Auto PAP Mask type: Nasal Settings: 8-16 cmH2O       PRILOSEC PO      Take 20 mg by mouth 2 times daily (before meals)

## 2017-05-24 LAB
ALBUMIN SERPL-MCNC: 4.1 G/DL (ref 3.4–5)
ALP SERPL-CCNC: 63 U/L (ref 40–150)
ALT SERPL W P-5'-P-CCNC: 33 U/L (ref 0–70)
ANION GAP SERPL CALCULATED.3IONS-SCNC: 10 MMOL/L (ref 3–14)
AST SERPL W P-5'-P-CCNC: 19 U/L (ref 0–45)
BILIRUB SERPL-MCNC: 0.6 MG/DL (ref 0.2–1.3)
BUN SERPL-MCNC: 25 MG/DL (ref 7–30)
CALCIUM SERPL-MCNC: 9.3 MG/DL (ref 8.5–10.1)
CHLORIDE SERPL-SCNC: 103 MMOL/L (ref 94–109)
CHOLEST SERPL-MCNC: 175 MG/DL
CO2 SERPL-SCNC: 27 MMOL/L (ref 20–32)
CREAT SERPL-MCNC: 1.25 MG/DL (ref 0.66–1.25)
GFR SERPL CREATININE-BSD FRML MDRD: 57 ML/MIN/1.7M2
GLUCOSE SERPL-MCNC: 110 MG/DL (ref 70–99)
HDLC SERPL-MCNC: 50 MG/DL
LDLC SERPL CALC-MCNC: 102 MG/DL
NONHDLC SERPL-MCNC: 125 MG/DL
POTASSIUM SERPL-SCNC: 3.7 MMOL/L (ref 3.4–5.3)
PROT SERPL-MCNC: 7.4 G/DL (ref 6.8–8.8)
PSA SERPL-ACNC: 0.25 UG/L (ref 0–4)
SODIUM SERPL-SCNC: 140 MMOL/L (ref 133–144)
TRIGL SERPL-MCNC: 115 MG/DL
TSH SERPL DL<=0.005 MIU/L-ACNC: 2.43 MU/L (ref 0.4–4)

## 2017-05-25 ENCOUNTER — DOCUMENTATION ONLY (OUTPATIENT)
Dept: SLEEP MEDICINE | Facility: CLINIC | Age: 72
End: 2017-05-25

## 2017-05-25 NOTE — PROGRESS NOTES
14 DAY [facility] VISIT    Message left for patient to return call    Assessment: Pt meeting objective benchmarks.     Action plan: Waiting for patient to return call and pt to have 30 day STM visit.   Device settings:    EPAP Min Auto CPAP: 8.0 (The minimum allowable pressure in cmH2O)    EPAP Max Auto CPAP: 16.0 (The maximum allowable pressure in cmH2O)    Target EPAP (95% of Target) 14 day average (Resmed): 12.7cm H20      Objective measures: 14 day rolling measure     % compliance greater than four hours rolling average 14 days: 100 %     95% OF Leak in litres Rolling Average 14 Days: 4.48 lpm last data upload        AHI Rolling Average 14 Day: 2.01  last data upload        Time mask on face 14 day average: 409 min         Objective measure goal  Compliance   Goal >70%  Leak   Goal < 10%  AHI  Goal < 5  Usage  Goal >240

## 2017-05-30 NOTE — PROGRESS NOTES
Patient called back and stated everything is going well and he feels more rested. Patient his wife sleep better. Patient has no questions or concerns.

## 2017-06-05 ENCOUNTER — OFFICE VISIT (OUTPATIENT)
Dept: SLEEP MEDICINE | Facility: CLINIC | Age: 72
End: 2017-06-05
Payer: COMMERCIAL

## 2017-06-05 VITALS
DIASTOLIC BLOOD PRESSURE: 84 MMHG | HEART RATE: 73 BPM | OXYGEN SATURATION: 99 % | HEIGHT: 64 IN | SYSTOLIC BLOOD PRESSURE: 147 MMHG | WEIGHT: 205.91 LBS | BODY MASS INDEX: 35.15 KG/M2

## 2017-06-05 DIAGNOSIS — E66.812 OBESITY, CLASS II, BMI 35-39.9: ICD-10-CM

## 2017-06-05 DIAGNOSIS — G47.33 OSA (OBSTRUCTIVE SLEEP APNEA): Primary | ICD-10-CM

## 2017-06-05 PROCEDURE — 99213 OFFICE O/P EST LOW 20 MIN: CPT | Performed by: INTERNAL MEDICINE

## 2017-06-05 NOTE — NURSING NOTE
"Chief Complaint   Patient presents with     Results     f/u cpap       Initial Pulse 73  Ht 1.626 m (5' 4.02\")  Wt 93.4 kg (205 lb 14.6 oz)  SpO2 99%  BMI 35.33 kg/m2 Estimated body mass index is 35.33 kg/(m^2) as calculated from the following:    Height as of this encounter: 1.626 m (5' 4.02\").    Weight as of this encounter: 93.4 kg (205 lb 14.6 oz).  Medication Reconciliation: complete       Jasmin Buenrostro LPN/MA  "

## 2017-06-05 NOTE — PROGRESS NOTES
[facility]  Out[facility] Medicine Follow-up  June 5, 2017      Name: Rodney Hendrickson MRN# [id number]   Age: 71 year old YOB: 1945   Date of visit: June 5, 2017  Primary care provider: [facility]         Assessment and Plan:     1. Obstructive Sleep Apnea:  - Full compliance of PAP use.  - Clinical response: good  - Recommend using CPAP every night for at least 7-8 hours each night  - Daytime naps are not advised, but use CPAP if taking naps  - Patient was advised not to drive if drowsy or sleepy.  - Follow up in sleep clinic in 12 months.    2. Obesity: Encouraged patient to lose weight. Counseled regarding weight loss through diet modification and increased physical activity. Patient was given nstuctions of weight loss and advised to follow up her PCP for further weight loss interventions. Weight loss instructions given.    3.  Hypertension:  - Advised adherence to anti-hypertensive medications, if prescribed  - Recommend follow up with PCP         No orders of the defined types were placed in this encounter.        Summary Counseling:  New sleep schedule recommendation: given    Check out [email or web address]    All questions were answered.  The patient indicates understanding of the above issues and agrees with the plan set forth.           Chief Complaint:    CPAP Follow up            History of Present Illness:     Rodney Hendrickson is a 71 year old male with history of moderate DOMINIK hypertension, hyperlipidemia, GERD and BPH who comes to [facility] for follow up. He was diagnosed sleep apnea and was prescribed PAP and was set up on May 2, 2017. Patient received a Resmed AirSense 10 Auto. Pressures were set at 8-16 cm H2O.   Patient s ramp is 5 cm H2O for 15 min and FLEX/EPR is EPR.  Patient received a [employer] Mask name: ESON 2  Nasal mask Size Large, heated tubing and heated humidifier.  Patient is enrolled in the STM Program and  does need to meet compliance.  He uses his CPAP every night with 100% compliance. He feels much better except he feels higher pressure when he wakes at night to bathroom without the Ramp.      PREVIOUS SLEEP STUDIES:  Home sleep study: 4/12/17   AHI 25.5/hr (supine 65.8/hr due to limited time of supine sleep)  INDER 16.8/hr  Snore index 36.1%  Lowest O 2 saturation is 71%  Time spent O 2 saturation below 88% was 16.1 minutes    CPAP Compliance:  Dates: 5/ /2017- 6/4 /2017       100 % >4hour use   Days used: 30/30  Average daily use (days used): 7.5 hrs  Leak 95 percentile: 4.2L/min  Residual AHI: 2/hr  Pressure: 8-16 cmH2O  95% percentile pressure: 12.8 cmH2O    Creighton Sleepiness Scale score today: 2/24   Pre-PAP Creighton Sleepiness Scale score: 18/24    PAP machine: ResMed/Resperonic.    Interface:  Mask: nasal mask  Chin strap: No  Leak: very rare  Humidity: Yes    Difficulties with therapy:    [-] Difficulty tolerating the pressure  [-] Epistaxis:   [-] Nasal congestion   [x] Dry mouth: all the time  [-] Mouth breathing:   [-] Pain/skin breakdown:     Improvements noted with CPAP:   [+] waking up more refreshed  [+] sleeping better with less arousals  [+] nocturia improved   [+] improved energy level during the day    SLEEP-WAKE SCHEDULE: unchanged    Other sleep problems: None     Drowsy driving / near incidents: No    Medications that affect sleep: No            Medications:     Current Outpatient Prescriptions   Medication Sig     order for DME Equipment ordered: RESMED Auto PAP Mask type: Nasal Settings: 8-16 cmH2O     ferrous sulfate (IRON) 325 (65 FE) MG tablet Take by mouth daily (with breakfast)     finasteride (PROSCAR) 5 MG tablet Take 1 tablet (5 mg) by mouth daily     lisinopril-hydrochlorothiazide (PRINZIDE,ZESTORETIC) 20-12.5 MG per tablet Take 1 tablet by mouth daily     Omeprazole (PRILOSEC PO) Take 20 mg by mouth 2 times daily (before meals)      Atorvastatin Calcium (LIPITOR PO) Take 40 mg by  mouth.     aspirin 81 MG tablet Take  by mouth daily.     No current facility-administered medications for this visit.      Facility-Administered Medications Ordered in Other Visits   Medication     benzocaine (HURRICAINE/TOPEX) 20 % spray        No Known Allergies         Past Medical History:     Past Medical History:   Diagnosis Date     BPH (benign prostatic hyperplasia)      GERD (gastroesophageal reflux disease)      Hyperlipidemia LDL goal <130      Hypertension              Past Surgical History:      Past Surgical History:   Procedure Laterality Date     ARTHROPLASTY PATELLO-FEMORAL (KNEE)      right     COLONOSCOPY       COLONOSCOPY N/A 2017    Procedure: COLONOSCOPY;  Surgeon: Geoffrey Gallegos MD;  Location:  GI     ESOPHAGOSCOPY, GASTROSCOPY, DUODENOSCOPY (EGD), COMBINED  5/3/2013    Procedure: COMBINED ESOPHAGOSCOPY, GASTROSCOPY, DUODENOSCOPY (EGD), BIOPSY SINGLE OR MULTIPLE;  ESOPHAGOSCOPY, GASTROSCOPY, DUODENOSCOPY (EGD) with biopies;  Surgeon: Geoffrey Gallegos MD;  Location:  GI     ESOPHAGOSCOPY, GASTROSCOPY, DUODENOSCOPY (EGD), COMBINED N/A 2016    Procedure: COMBINED ESOPHAGOSCOPY, GASTROSCOPY, DUODENOSCOPY (EGD), BIOPSY SINGLE OR MULTIPLE;  Surgeon: Geoffrey Gallegos MD;  Location:  GI     ESOPHAGOSCOPY, GASTROSCOPY, DUODENOSCOPY (EGD), COMBINED N/A 2017    Procedure: COMBINED ESOPHAGOSCOPY, GASTROSCOPY, DUODENOSCOPY (EGD), BIOPSY SINGLE OR MULTIPLE;  Surgeon: Geoffrey Gallegos MD;  Location:  GI     ROTATOR CUFF REPAIR RT/LT      right and left       Social History   Substance Use Topics     Smoking status: Former Smoker     Quit date: 1983     Smokeless tobacco: Not on file     Alcohol use Yes      Comment: occa       Family History   Problem Relation Age of Onset     Hypertension Father      Colon Cancer Father       of Colon CA     Heart Defect Father      Enlarged heart     Hypertension Brother      Hypertension Sister      Hypertension Brother             " Physical Examination:   /84  Pulse 73  Ht 1.626 m (5' 4.02\")  Wt 93.4 kg (205 lb 14.6 oz)  SpO2 99%  BMI 35.33 kg/m2            Data: All pertinent previous laboratory data reviewed     No results found for: PH, PHARTERIAL, PO2, [id number], SAT, PCO2, HCO3, BASEEXCESS, RITCHIE, BEB  Lab Results   Component Value Date    TSH 2.43 05/23/2017     Lab Results   Component Value Date     (H) 05/23/2017     (A) 05/11/2016     Lab Results   Component Value Date    HGB 13.7 05/23/2017     Lab Results   Component Value Date    BUN 25 05/23/2017    CR 1.25 05/23/2017    CR 1.4 (A) 05/11/2016     No results found for: SHARRI      He will follow up with me in about 12 month(s).         Copy to: [facility]      Miguel Esposito MD 6/5/2017     [facility]  [id number] [place], [place] [id number]       Fifteen minutes spent with patient, all of which were spent face-to-face counseling, consulting, coordinating plan of care and going over PAP download/sleep study and chart review.          "

## 2017-06-05 NOTE — PATIENT INSTRUCTIONS
MY TREATMENT INFORMATION FOR SLEEP APNEA-  Rodney GUILLEN Emeka    MY CONTACT NUMBERS ARE:  [phone]  DOCTOR : Miguel WRIGHT [facility]  SLEEP CENTER :  [place]  CPAP EQUIPMENT     Your sleep apnea is controlled with CPAP.   Continue excellent use of CPAP.    Objective measure goal  Compliance  Goal >70%  Leak   Goal < 10%  AHI  Goal < 5 events per hour   Usage  Goal >420 minutes      Follow up in 12 months.        Your BMI is Body mass index is 35.33 kg/(m^2).  Weight management is a personal decision.  If you are interested in exploring weight loss strategies, the following discussion covers the approaches that may be successful. Body mass index (BMI) is one way to tell whether you are at a healthy weight, overweight, or obese. It measures your weight in relation to your height.  A BMI of 18.5 to 24.9 is in the healthy range. A person with a BMI of 25 to 29.9 is considered overweight, and someone with a BMI of 30 or greater is considered obese. More than two-thirds of American adults are considered overweight or obese.  Being overweight or obese increases the risk for further weight gain. Excess weight may lead to heart disease and diabetes.  Creating and following plans for healthy eating and physical activity may help you improve your health.  Weight control is part of healthy lifestyle and includes exercise, emotional health, and healthy eating habits. Careful eating habits lifelong are the mainstay of weight control. Though there are significant health benefits from weight loss, long-term weight loss with diet alone may be very difficult to achieve- studies show long-term success with dietary management in less than 10% of people. Attaining a healthy weight may be especially difficult to achieve in those with severe obesity. In some cases, medications, devices and surgical management might be considered.  What can you do?  If you are overweight or obese and are interested in methods for weight loss, you should  discuss this with your provider.     Consider reducing daily calorie intake by 500 calories.     Keep a food journal.     Avoiding skipping meals, consider cutting portions instead.    Diet combined with exercise helps maintain muscle while optimizing fat loss. Strength training is particularly important for building and maintaining muscle mass. Exercise helps reduce stress, increase energy, and improves fitness. Increasing exercise without diet control, however, may not burn enough calories to loose weight.       Start walking three days a week 10-20 minutes at a time    Work towards walking thirty minutes five days a week     Eventually, increase the speed of your walking for 1-2 minutes at time    In addition, we recommend that you review healthy lifestyles and methods for weight loss available through the National Institutes of Health patient information sites:  http://win.niddk.nih.gov/publications/index.htm    And look into health and wellness programs that may be available through your health insurance provider, employer, local community center, or eliana club.    Weight management plan: Patient was referred to their PCP to discuss a diet and exercise plan.     Your blood pressure was checked while you were in clinic today.  Please read the guidelines below about what these numbers mean and what you should do about them.  Your systolic blood pressure is the top number.  This is the pressure when the heart is pumping.  Your diastolic blood pressure is the bottom number.  This is the pressure in between beats.  If your systolic blood pressure is less than 120 and your diastolic blood pressure is less than 80, then your blood pressure is normal. There is nothing more that you need to do about it  If your systolic blood pressure is 120-139 or your diastolic blood pressure is 80-89, your blood pressure may be higher than it should be.  You should have your blood pressure re-checked within a year by a primary care  provider.  If your systolic blood pressure is 140 or greater or your diastolic blood pressure is 90 or greater, you may have high blood pressure.  High blood pressure is treatable, but if left untreated over time it can put you at risk for heart attack, stroke, or kidney failure.  You should have your blood pressure re-checked by a primary care provider within the next four weeks.

## 2017-06-05 NOTE — MR AVS SNAPSHOT
After Visit Summary   6/5/2017    Rodney Hendrickson    MRN: [id number]           Patient Information     Date Of Birth          1945        Visit Information        Provider Department      6/5/2017 9:30 AM Miguel Esposito MD [facility]        Care Instructions    MY TREATMENT INFORMATION FOR SLEEP APNEA-  Rodney Hendrickson    MY CONTACT NUMBERS ARE:  [phone]  DOCTOR : Miguel Esposito  SLEEP CENTER :  [place]  CPAP EQUIPMENT     Your sleep apnea is controlled with CPAP.   Continue excellent use of CPAP.    Objective measure goal  Compliance  Goal >70%  Leak   Goal < 10%  AHI  Goal < 5 events per hour   Usage  Goal >420 minutes      Follow up in 12 months.        Your BMI is Body mass index is 35.33 kg/(m^2).  Weight management is a personal decision.  If you are interested in exploring weight loss strategies, the following discussion covers the approaches that may be successful. Body mass index (BMI) is one way to tell whether you are at a healthy weight, overweight, or obese. It measures your weight in relation to your height.  A BMI of 18.5 to 24.9 is in the healthy range. A person with a BMI of 25 to 29.9 is considered overweight, and someone with a BMI of 30 or greater is considered obese. More than two-thirds of American adults are considered overweight or obese.  Being overweight or obese increases the risk for further weight gain. Excess weight may lead to heart disease and diabetes.  Creating and following plans for healthy eating and physical activity may help you improve your health.  Weight control is part of healthy lifestyle and includes exercise, emotional health, and healthy eating habits. Careful eating habits lifelong are the mainstay of weight control. Though there are significant health benefits from weight loss, long-term weight loss with diet alone may be very difficult to achieve- studies show long-term success with dietary management in less  than 10% of people. Attaining a healthy weight may be especially difficult to achieve in those with severe obesity. In some cases, medications, devices and surgical management might be considered.  What can you do?  If you are overweight or obese and are interested in methods for weight loss, you should discuss this with your provider.     Consider reducing daily calorie intake by 500 calories.     Keep a food journal.     Avoiding skipping meals, consider cutting portions instead.    Diet combined with exercise helps maintain muscle while optimizing fat loss. Strength training is particularly important for building and maintaining muscle mass. Exercise helps reduce stress, increase energy, and improves fitness. Increasing exercise without diet control, however, may not burn enough calories to loose weight.       Start walking three days a week 10-20 minutes at a time    Work towards walking thirty minutes five days a week     Eventually, increase the speed of your walking for 1-2 minutes at time    In addition, we recommend that you review healthy lifestyles and methods for weight loss available through the National Institutes of Health patient information sites:  http://win.niddk.nih.gov/publications/index.htm    And look into health and wellness programs that may be available through your health insurance provider, employer, local community center, or eliana club.    Weight management plan: Patient was referred to their PCP to discuss a diet and exercise plan.     Your blood pressure was checked while you were in clinic today.  Please read the guidelines below about what these numbers mean and what you should do about them.  Your systolic blood pressure is the top number.  This is the pressure when the heart is pumping.  Your diastolic blood pressure is the bottom number.  This is the pressure in between beats.  If your systolic blood pressure is less than 120 and your diastolic blood pressure is less than 80,  then your blood pressure is normal. There is nothing more that you need to do about it  If your systolic blood pressure is 120-139 or your diastolic blood pressure is 80-89, your blood pressure may be higher than it should be.  You should have your blood pressure re-checked within a year by a primary care provider.  If your systolic blood pressure is 140 or greater or your diastolic blood pressure is 90 or greater, you may have high blood pressure.  High blood pressure is treatable, but if left untreated over time it can put you at risk for heart attack, stroke, or kidney failure.  You should have your blood pressure re-checked by a primary care provider within the next four weeks.              Follow-ups after your visit        Your next 10 appointments already scheduled     Sep 28, 2017  2:30 PM CDT   Return Sleep Patient with Miguel Esposito MD   [facility]    [address]  [facility] [facility] [id number] [phone]              Who to contact     If you have questions or need follow up information about today's clinic visit or your schedule please contact [facility] directly at [phone].  Normal or non-critical lab and imaging results will be communicated to you by MyChart, letter or phone within 4 business days after the clinic has received the results. If you do not hear from us within 7 days, please contact the clinic through MyChart or phone. If you have a critical or abnormal lab result, we will notify you by phone as soon as possible.  Submit refill requests through [employer] or call your pharmacy and they will forward the refill request to us. Please allow 3 business days for your refill to be completed.          Additional Information About Your Visit        [employer] Information     [employer] lets you send messages to your doctor, view your test results, renew your prescriptions, schedule appointments  "and more. To sign up, go to [email or web address] . Click on \"Log in\" on the left side of the screen, which will take you to the Welcome page. Then click on \"Sign up Now\" on the right side of the page.     You will be asked to enter the access code listed below, as well as some personal information. Please follow the directions to create your username and password.     Your access code is: [email or web address]  Expires: 2017  2:34 PM     Your access code will  in 90 days. If you need help or a new code, please call your [facility] clinic or [phone].        Care EveryWhere ID     This is your Care EveryWhere ID. This could be used by other organizations to access your [facility] medical records  [id number]        Your Vitals Were     Pulse Height Pulse Oximetry BMI (Body Mass Index)          73 1.626 m (5' 4.02\") 99% 35.33 kg/m2         Blood Pressure from Last 3 Encounters:   17 147/84   17 132/82   17 128/81    Weight from Last 3 Encounters:   17 93.4 kg (205 lb 14.6 oz)   17 93.4 kg (206 lb)   17 89.4 kg (197 lb 1.5 oz)              Today, you had the following     No orders found for display       Primary Care Provider Fax #    [facility] [phone]       No address on file        Thank you!     Thank you for choosing [facility]  for your care. Our goal is always to provide you with excellent care. Hearing back from our patients is one way we can continue to improve our services. Please take a few minutes to complete the written survey that you may receive in the mail after your visit with us. Thank you!             Your Updated Medication List - Protect others around you: Learn how to safely use, store and throw away your medicines at www.disposemymeds.org.          This list is accurate as of: 17  9:51 AM.  Always use your most recent med list.                   Brand Name Dispense Instructions for use    aspirin 81 MG " tablet      Take  by mouth daily.       ferrous sulfate 325 (65 FE) MG tablet    IRON     Take by mouth daily (with breakfast)       finasteride 5 MG tablet    PROSCAR    90 tablet    Take 1 tablet (5 mg) by mouth daily       LIPITOR PO      Take 40 mg by mouth.       lisinopril-hydrochlorothiazide 20-12.5 MG per tablet    PRINZIDE/ZESTORETIC     Take 1 tablet by mouth daily       order for DME      Equipment ordered: RESMED Auto PAP Mask type: Nasal Settings: 8-16 cmH2O       PRILOSEC PO      Take 20 mg by mouth 2 times daily (before meals)

## 2017-06-09 DIAGNOSIS — K21.9 ESOPHAGEAL REFLUX: ICD-10-CM

## 2017-06-09 DIAGNOSIS — K21.9 GASTROESOPHAGEAL REFLUX DISEASE WITHOUT ESOPHAGITIS: ICD-10-CM

## 2017-06-09 DIAGNOSIS — E78.5 HYPERLIPIDEMIA LDL GOAL <130: Primary | ICD-10-CM

## 2017-06-09 DIAGNOSIS — I10 BENIGN ESSENTIAL HYPERTENSION: ICD-10-CM

## 2017-06-09 DIAGNOSIS — I10 HYPERTENSION: ICD-10-CM

## 2017-06-09 RX ORDER — LISINOPRIL AND HYDROCHLOROTHIAZIDE 12.5; 2 MG/1; MG/1
1 TABLET ORAL DAILY
Qty: 90 TABLET | Refills: 1 | Status: SHIPPED | OUTPATIENT
Start: 2017-06-09 | End: 2017-12-01

## 2017-06-09 RX ORDER — ATORVASTATIN CALCIUM 40 MG/1
40 TABLET, FILM COATED ORAL AT BEDTIME
Qty: 90 TABLET | Refills: 1 | Status: SHIPPED | OUTPATIENT
Start: 2017-06-09 | End: 2017-11-16

## 2017-06-09 RX ORDER — OMEPRAZOLE 40 MG/1
40 CAPSULE, DELAYED RELEASE ORAL DAILY
Qty: 90 CAPSULE | Refills: 1 | Status: SHIPPED | OUTPATIENT
Start: 2017-06-09 | End: 2017-11-16

## 2017-06-09 NOTE — TELEPHONE ENCOUNTER
ALL RX'S ARE PATIENT/PHARMACY REPORTED.    Lipitor 40MG     Last Written Prescription Date: 05/14/17  Last Fill Quantity: 25, # refills: 0  Last Office Visit with [facility], [facility] or [facility] prescribing provider: 05/23/17       Lab Results   Component Value Date    CHOL 175 05/23/2017     Lab Results   Component Value Date    HDL 50 05/23/2017     Lab Results   Component Value Date     05/23/2017     Lab Results   Component Value Date    TRIG 115 05/23/2017     No results found for: CHOLHDLRATIO    Labs showing if normal/abnormal  Lab Results   Component Value Date    CHOL 175 05/23/2017    TRIG 115 05/23/2017    HDL 50 05/23/2017     (H) 05/23/2017     Omeprazole 20MG      Last Written Prescription Date: 05/15/17  Last Fill Quantity: 50,  # refills: 0   Last Office Visit with [facility], [facility] or [facility] prescribing provider: 05/23/17    Lisinopril-Hydrochlorothiazide      Last Written Prescription Date: 05/14/17  Last Fill Quantity: 25, # refills: 0  Last Office Visit with [facility], [facility] or [facility] prescribing provider: 05/23/17       Potassium   Date Value Ref Range Status   05/23/2017 3.7 3.4 - 5.3 mmol/L Final     Creatinine   Date Value Ref Range Status   05/23/2017 1.25 0.66 - 1.25 mg/dL Final     BP Readings from Last 3 Encounters:   06/05/17 147/84   05/23/17 132/82   04/25/17 128/81

## 2017-06-12 ENCOUNTER — DOCUMENTATION ONLY (OUTPATIENT)
Dept: SLEEP MEDICINE | Facility: CLINIC | Age: 72
End: 2017-06-12

## 2017-06-12 NOTE — PROGRESS NOTES
30 DAY STM VISIT    Subjective measures:   Pt states things are going well and has no issues or complaints.  Pt is benefiting from therapy.      Assessment: Pt meeting objective benchmarks.  Patient meeting subjective benchmarks.  Recent mask exchange and he is feeling better.    Action plan: Pt to have 6 month STM visit    Device settings:    EPAP Min Auto CPAP : 8.0 (The minimum allowable pressure in cmH2O)    EPAP Max Auto CPAP : 16.0 (The maximum allowable pressure in cmH2O)    Target  (95% of Target) 14 day average (Resmed): 12.7cm H20    Objective measures: 14 day rolling measures      % compliance greater than four hours rolling average 14 days: 100 %     95% OF Leak in litres Rolling Average 14 Days: 2.88 lpm  last  upload     AHI Rolling Average 14 Day: 1.95 last  upload     Time mask on face 14 day average: 419 min        Objective measure goal  Compliance   Goal >70%  Leak   Goal < 10%  AHI  Goal < 5  Usage  Goal >240

## 2017-07-27 ENCOUNTER — ALLIED HEALTH/NURSE VISIT (OUTPATIENT)
Dept: INTERNAL MEDICINE | Facility: CLINIC | Age: 72
End: 2017-07-27
Payer: COMMERCIAL

## 2017-07-27 ENCOUNTER — ALLIED HEALTH/NURSE VISIT (OUTPATIENT)
Dept: NURSING | Facility: CLINIC | Age: 72
End: 2017-07-27

## 2017-07-27 VITALS — SYSTOLIC BLOOD PRESSURE: 132 MMHG | DIASTOLIC BLOOD PRESSURE: 82 MMHG

## 2017-07-27 DIAGNOSIS — I10 ESSENTIAL HYPERTENSION: Primary | ICD-10-CM

## 2017-07-27 DIAGNOSIS — Z53.9 DIAGNOSIS FOR ++++ WALK IN CLINIC ++++: Primary | ICD-10-CM

## 2017-07-27 PROCEDURE — 99207 ZZC NO CHARGE NURSE ONLY: CPT | Performed by: INTERNAL MEDICINE

## 2017-07-27 NOTE — NURSING NOTE
Pt walked in clinic-Took 2 40mg caps this am in error. I talked to Junior prior before meeting with pt. Junior stated there should be no probs, just resume normal dose tomorrow am. Relayed this info to pt.

## 2017-07-27 NOTE — PROGRESS NOTES
Rodney Hendrickson is enrolled/participating in the retail pharmacy Blood Pressure Goals Achievement Program (BPGAP).  Rodeny Hendrickson was evaluated at [facility] on July 27, 2017 at which time his blood pressure was:    BP Readings from Last 3 Encounters:   07/27/17 132/82   06/05/17 147/84   05/23/17 132/82     Reviewed lifestyle modifications for blood pressure control and reduction: including making healthy food choices, managing weight, getting regular exercise, smoking cessation, reducing alcohol consumption, monitoring blood pressure regularly.     Rodney Hendrickson is not experiencing symptoms.    Follow-Up: BP is at goal of < 140/90mmHg (patient 18+ years of age with or without diabetes).  Recommended follow-up at pharmacy in 6 months.     Recommendation to Provider: none      Rondey Hendrickson was evaluated for enrollment into the PGEN study today.    Patient eligible for enrollment:  No  Patient interested in enrollment:  Unknown    Completed by: Alondra ALEGRIA.Ph.  [facility]   [phone]

## 2017-07-27 NOTE — MR AVS SNAPSHOT
"              After Visit Summary   7/27/2017    Rodney Hendrickson    MRN: [id number]           Patient Information     Date Of Birth          1945        Visit Information        Provider Department      7/27/2017 11:26 AM Brennan Mercado MD [facility]        Today's Diagnoses     Essential hypertension    -  1       Follow-ups after your visit        Your next 10 appointments already scheduled     Sep 28, 2017  2:30 PM CDT   Return Sleep Patient with Miguel Esposito MD   [facility]    [address] [facility] [address] [place] [address]   [phone]            Jun 04, 2018  8:00 AM CDT   Return Sleep Patient with Miguel Esposito MD   [facility] ([facility])    [address] [address] [address]   [phone]              Who to contact     If you have questions or need follow up information about today's clinic visit or your schedule please contact [facility] directly at [phone].  Normal or non-critical lab and imaging results will be communicated to you by For Your Imaginationhart, letter or phone within 4 business days after the clinic has received the results. If you do not hear from us within 7 days, please contact the clinic through For Your Imaginationhart or phone. If you have a critical or abnormal lab result, we will notify you by phone as soon as possible.  Submit refill requests through Shopmium or call your pharmacy and they will forward the refill request to us. Please allow 3 business days for your refill to be completed.          Additional Information About Your Visit        For Your ImaginationharLIFT12 Information     Shopmium lets you send messages to your doctor, view your test results, renew your prescriptions, schedule appointments and more. To sign up, go to [email or web address] . Click on \"Log in\" on the left side of the screen, which will take you to the " "Welcome page. Then click on \"Sign up Now\" on the right side of the page.     You will be asked to enter the access code listed below, as well as some personal information. Please follow the directions to create your username and password.     Your access code is: [email or web address]  Expires: 10/25/2017 10:04 AM     Your access code will  in 90 days. If you need help or a new code, please call your [place] clinic or [phone].        Care EveryWhere ID     This is your Care EveryWhere ID. This could be used by other organizations to access your [place] medical records  [id number]         Blood Pressure from Last 3 Encounters:   17 132/82   17 147/84   17 132/82    Weight from Last 3 Encounters:   17 205 lb 14.6 oz (93.4 kg)   17 206 lb (93.4 kg)   17 197 lb 1.5 oz (89.4 kg)              Today, you had the following     No orders found for display       Primary Care Provider Office Phone # Fax #    Brennan Mercado [phone]       [facility] [address] [place] [id number]        Equal Access to Services     ZEN CARRASCO : Hadii aad ku hadasho Soomaali, waaxda luqadaha, qaybta kaalmada adeegyada, waxay idiin hayemren joseph charles . So [facility] [phone].    ATENCIÓN: Si habla español, tiene a andrews disposición servicios gratuitos de asistencia lingüística. Llame al [phone].    We comply with applicable federal civil rights laws and [place] laws. We do not discriminate on the basis of race, color, national origin, age, disability sex, sexual orientation or gender identity.            Thank you!     Thank you for choosing [facility]  for your care. Our goal is always to provide you with excellent care. Hearing back from our patients is one way we can continue to improve our services. Please take a few minutes to complete the written survey that you may receive in the mail after your visit with us. Thank you!   "           Your Updated Medication List - Protect others around you: Learn how to safely use, store and throw away your medicines at www.disposemymeds.org.          This list is accurate as of: 7/27/17 11:27 AM.  Always use your most recent med list.                   Brand Name Dispense Instructions for use Diagnosis    aspirin 81 MG tablet      Take  by mouth daily.        atorvastatin 40 MG tablet    LIPITOR    90 tablet    Take 1 tablet (40 mg) by mouth At Bedtime    Hyperlipidemia LDL goal <130       ferrous sulfate 325 (65 FE) MG tablet    IRON     Take by mouth daily (with breakfast)        finasteride 5 MG tablet    PROSCAR    90 tablet    Take 1 tablet (5 mg) by mouth daily    Benign non-nodular prostatic hyperplasia with lower urinary tract symptoms       lisinopril-hydrochlorothiazide 20-12.5 MG per tablet    PRINZIDE/ZESTORETIC    90 tablet    Take 1 tablet by mouth daily    Benign essential hypertension       omeprazole 40 MG capsule    priLOSEC    90 capsule    Take 1 capsule (40 mg) by mouth daily Take 30-60 minutes before a meal.    Gastroesophageal reflux disease without esophagitis       order for DME      Equipment ordered: RESMED Auto PAP Mask type: Nasal Settings: 8-16 cmH2O        PRILOSEC PO      Take 20 mg by mouth 2 times daily (before meals)

## 2017-07-27 NOTE — MR AVS SNAPSHOT
"              After Visit Summary   7/27/2017    Rodney Hendrickson    MRN: [id number]           Patient Information     Date Of Birth          1945        Visit Information        Provider Department      7/27/2017 9:15 AM Rn, [facility]        Today's Diagnoses     DIAGNOSIS FOR ++++ WALK IN CLINIC ++++    -  1       Follow-ups after your visit        Your next 10 appointments already scheduled     Sep 28, 2017  2:30 PM CDT   [place] [facility] with [place] [facility] [place]   [facility]    [address] [facility] [address] [place] [address]   [phone]            Jun 04, 2018  8:00 AM CDT   [facility] Sleep Patient with Miguel [facility] MD   [place]    [address] [address] [address] [phone]              Who to contact     If you have questions or need follow up information about today's clinic visit or your schedule please contact [facility] directly at [phone].  Normal or non-critical lab and imaging results will be communicated to you by FitnessKeeperhart, letter or phone within 4 business days after the clinic has received the results. If you do not hear from us within 7 days, please contact the clinic through FitnessKeeperhart or phone. If you have a critical or abnormal lab result, we will notify you by phone as soon as possible.  Submit refill requests through Integrity Tracking or call your pharmacy and they will forward the refill request to us. Please allow 3 business days for your refill to be completed.          Additional Information About Your Visit        FitnessKeeperhart Information     Integrity Tracking lets you send messages to your doctor, view your test results, renew your prescriptions, schedule appointments and more. To sign up, go to [email or web address] . Click on \"Log in\" on the left side of the screen, which will take you to the " "Welcome page. Then click on \"Sign up Now\" on the right side of the page.     You will be asked to enter the access code listed below, as well as some personal information. Please follow the directions to create your username and password.     Your access code is: [email or web address]  Expires: 10/25/2017 10:04 AM     Your access code will  in 90 days. If you need help or a new code, please call your [place] clinic or [phone].        Care EveryWhere ID     This is your Care EveryWhere ID. This could be used by other organizations to access your [place] medical records  [id number]         Blood Pressure from Last 3 Encounters:   17 147/84   17 132/82   17 128/81    Weight from Last 3 Encounters:   17 205 lb 14.6 oz (93.4 kg)   17 206 lb (93.4 kg)   17 197 lb 1.5 oz (89.4 kg)              Today, you had the following     No orders found for display       Primary Care Provider Office Phone # Fax #    Brennan Mercado [phone]       [facility] [place] [address] [place] [id number]        Equal Access to Services     ZEN CARRASCO : Hadii aad ku hadasho Soomaali, waaxda luqadaha, qaybta kaalmada adeegyada, waxay idiin hayemren joseph charles . So [facility] [phone].    ATENCIÓN: Si habla español, tiene a andrews disposición servicios gratuitos de asistencia lingüística. Llame al [phone].    We comply with applicable federal civil rights laws and [place] laws. We do not discriminate on the basis of race, color, national origin, age, disability sex, sexual orientation or gender identity.            Thank you!     Thank you for choosing [facility]  for your care. Our goal is always to provide you with excellent care. Hearing back from our patients is one way we can continue to improve our services. Please take a few minutes to complete the written survey that you may receive in the mail after your visit with us. Thank you!   "           Your Updated Medication List - Protect others around you: Learn how to safely use, store and throw away your medicines at www.disposemymeds.org.          This list is accurate as of: 7/27/17 10:04 AM.  Always use your most recent med list.                   Brand Name Dispense Instructions for use Diagnosis    aspirin 81 MG tablet      Take  by mouth daily.        atorvastatin 40 MG tablet    LIPITOR    90 tablet    Take 1 tablet (40 mg) by mouth At Bedtime    Hyperlipidemia LDL goal <130       ferrous sulfate 325 (65 FE) MG tablet    IRON     Take by mouth daily (with breakfast)        finasteride 5 MG tablet    PROSCAR    90 tablet    Take 1 tablet (5 mg) by mouth daily    Benign non-nodular prostatic hyperplasia with lower urinary tract symptoms       lisinopril-hydrochlorothiazide 20-12.5 MG per tablet    PRINZIDE/ZESTORETIC    90 tablet    Take 1 tablet by mouth daily    Benign essential hypertension       omeprazole 40 MG capsule    priLOSEC    90 capsule    Take 1 capsule (40 mg) by mouth daily Take 30-60 minutes before a meal.    Gastroesophageal reflux disease without esophagitis       order for DME      Equipment ordered: RESMED Auto PAP Mask type: Nasal Settings: 8-16 cmH2O        PRILOSEC PO      Take 20 mg by mouth 2 times daily (before meals)

## 2017-11-11 DIAGNOSIS — K21.9 GASTROESOPHAGEAL REFLUX DISEASE WITHOUT ESOPHAGITIS: ICD-10-CM

## 2017-11-13 RX ORDER — OMEPRAZOLE 40 MG/1
CAPSULE, DELAYED RELEASE ORAL
Qty: 90 CAPSULE | Refills: 0 | OUTPATIENT
Start: 2017-11-13

## 2017-11-16 ENCOUNTER — ALLIED HEALTH/NURSE VISIT (OUTPATIENT)
Dept: INTERNAL MEDICINE | Facility: CLINIC | Age: 72
End: 2017-11-16
Payer: COMMERCIAL

## 2017-11-16 VITALS — DIASTOLIC BLOOD PRESSURE: 82 MMHG | SYSTOLIC BLOOD PRESSURE: 144 MMHG

## 2017-11-16 DIAGNOSIS — E78.5 HYPERLIPIDEMIA LDL GOAL <130: ICD-10-CM

## 2017-11-16 DIAGNOSIS — K21.9 GASTROESOPHAGEAL REFLUX DISEASE WITHOUT ESOPHAGITIS: ICD-10-CM

## 2017-11-16 DIAGNOSIS — I10 ESSENTIAL HYPERTENSION: Primary | ICD-10-CM

## 2017-11-16 PROCEDURE — 99207 ZZC NO CHARGE NURSE ONLY: CPT | Performed by: INTERNAL MEDICINE

## 2017-11-16 NOTE — MR AVS SNAPSHOT
"              After Visit Summary   11/16/2017    Rodney Hendrickson    MRN: [id number]           Patient Information     Date Of Birth          1945        Visit Information        Provider Department      11/16/2017 10:25 AM Brennan Mercado MD [facility]        Today's Diagnoses     Essential hypertension    -  1       Follow-ups after your visit        Your next 10 appointments already scheduled     Jun 04, 2018  8:00 AM CDT   Return Sleep Patient with Miguel Esposito MD   [facility]    [address] [facility] [address] [place] [id number] [phone]              Who to contact     If you have questions or need follow up information about today's clinic visit or your schedule please contact [facility] directly at [phone].  Normal or non-critical lab and imaging results will be communicated to you by MyChart, letter or phone within 4 business days after the clinic has received the results. If you do not hear from us within 7 days, please contact the clinic through MyChart or phone. If you have a critical or abnormal lab result, we will notify you by phone as soon as possible.  Submit refill requests through [email or web address] or call your pharmacy and they will forward the refill request to us. Please allow 3 business days for your refill to be completed.          Additional Information About Your Visit        MyChart Information     [email or web address] lets you send messages to your doctor, view your test results, renew your prescriptions, schedule appointments and more. To sign up, go to [email or web address] . Click on \"Log in\" on the left side of the screen, which will take you to the Welcome page. Then click on \"Sign up Now\" on the right side of the page.     You will be asked to enter the access code listed below, as well as some personal information. Please follow the directions to create your username and " password.     Your access code is: [email or web address]  Expires: 2018 10:26 AM     Your access code will  in 90 days. If you need help or a new code, please call your [place] clinic or [phone].        Care EveryWhere ID     This is your Care EveryWhere ID. This could be used by other organizations to access your [place] medical records  [id number]         Blood Pressure from Last 3 Encounters:   17 144/82   17 132/82   17 147/84    Weight from Last 3 Encounters:   17 205 lb 14.6 oz (93.4 kg)   17 206 lb (93.4 kg)   17 197 lb 1.5 oz (89.4 kg)              Today, you had the following     No orders found for display       Primary Care Provider Office Phone # Fax #    Brennan Mercado [phone]       [address] [facility] [address]        Equal Access to Services     [facility]: Hadii aad ku hadasho Soomaali, waaxda luqadaha, qaybta kaalmada adeegyada, waxay jack hayeddie charles . So [facility] [phone].    ATENCIÓN: Si habla español, tiene a andrews disposición servicios gratuitos de asistencia lingüística. Llame al [phone].    We comply with applicable federal civil rights laws and [place] laws. We do not discriminate on the basis of race, color, national origin, age, disability, sex, sexual orientation, or gender identity.            Thank you!     Thank you for choosing [facility]  for your care. Our goal is always to provide you with excellent care. Hearing back from our patients is one way we can continue to improve our services. Please take a few minutes to complete the written survey that you may receive in the mail after your visit with us. Thank you!             Your Updated Medication List - Protect others around you: Learn how to safely use, store and throw away your medicines at www.disposemymeds.org.          This list is accurate as of: 17 10:26 AM.  Always use your most recent med list.                    Brand Name Dispense Instructions for use Diagnosis    aspirin 81 MG tablet      Take  by mouth daily.        atorvastatin 40 MG tablet    LIPITOR    90 tablet    Take 1 tablet (40 mg) by mouth At Bedtime    Hyperlipidemia LDL goal <130       ferrous sulfate 325 (65 FE) MG tablet    IRON     Take by mouth daily (with breakfast)        finasteride 5 MG tablet    PROSCAR    90 tablet    Take 1 tablet (5 mg) by mouth daily    Benign non-nodular prostatic hyperplasia with lower urinary tract symptoms       lisinopril-hydrochlorothiazide 20-12.5 MG per tablet    PRINZIDE/ZESTORETIC    90 tablet    Take 1 tablet by mouth daily    Benign essential hypertension       omeprazole 40 MG capsule    priLOSEC    90 capsule    Take 1 capsule (40 mg) by mouth daily Take 30-60 minutes before a meal.    Gastroesophageal reflux disease without esophagitis       order for DME      Equipment ordered: RESMED Auto PAP Mask type: Nasal Settings: 8-16 cmH2O        PRILOSEC PO      Take 20 mg by mouth 2 times daily (before meals)

## 2017-11-16 NOTE — PROGRESS NOTES
Rodney Hendrickson is enrolled/participating in the retail pharmacy Blood Pressure Goals Achievement Program (BPGAP).  Rodney Hendrickson was evaluated at [facility] on November 16, 2017 at which time his blood pressure was:    BP Readings from Last 3 Encounters:   11/16/17 144/82   07/27/17 132/82   06/05/17 147/84     Reviewed lifestyle modifications for blood pressure control and reduction: including making healthy food choices, managing weight, getting regular exercise, smoking cessation, reducing alcohol consumption, monitoring blood pressure regularly.     Rodney Hendrickson is not experiencing symptoms.    Follow-Up: BP is not at goal of < 140/90mmHg (patient 18+ years of age with or without diabetes), Recommended follow-up in 1 month at the pharmacy. Routing to PCP as an FYI.    Recommendation to Provider:     Rodney Hendrickson was evaluated for enrollment into the PGEN study today.    Patient eligible for enrollment:  No  Patient interested in enrollment:  No    Completed by: Alondra Echols R.Ph.  [facility]   [phone]

## 2017-11-20 RX ORDER — ATORVASTATIN CALCIUM 40 MG/1
TABLET, FILM COATED ORAL
Qty: 90 TABLET | Refills: 1 | Status: SHIPPED | OUTPATIENT
Start: 2017-11-20 | End: 2018-05-17

## 2017-11-20 RX ORDER — OMEPRAZOLE 40 MG/1
CAPSULE, DELAYED RELEASE ORAL
Qty: 90 CAPSULE | Refills: 1 | Status: SHIPPED | OUTPATIENT
Start: 2017-11-20 | End: 2018-05-17

## 2017-12-01 DIAGNOSIS — I10 BENIGN ESSENTIAL HYPERTENSION: ICD-10-CM

## 2017-12-05 RX ORDER — LISINOPRIL AND HYDROCHLOROTHIAZIDE 12.5; 2 MG/1; MG/1
TABLET ORAL
Qty: 90 TABLET | Refills: 1 | Status: SHIPPED | OUTPATIENT
Start: 2017-12-05 | End: 2018-05-17

## 2017-12-06 ENCOUNTER — ALLIED HEALTH/NURSE VISIT (OUTPATIENT)
Dept: INTERNAL MEDICINE | Facility: CLINIC | Age: 72
End: 2017-12-06
Payer: COMMERCIAL

## 2017-12-06 VITALS — DIASTOLIC BLOOD PRESSURE: 72 MMHG | SYSTOLIC BLOOD PRESSURE: 136 MMHG

## 2017-12-06 DIAGNOSIS — I10 ESSENTIAL HYPERTENSION: Primary | ICD-10-CM

## 2017-12-06 PROCEDURE — 99207 ZZC NO CHARGE NURSE ONLY: CPT | Performed by: INTERNAL MEDICINE

## 2017-12-06 NOTE — PROGRESS NOTES
Rodney Hendrickson is enrolled/participating in the retail pharmacy Blood Pressure Goals Achievement Program (BPGAP).  Rodney Hendrickson was evaluated at [facility] on December 6, 2017 at which time his blood pressure was:    BP Readings from Last 3 Encounters:   12/06/17 136/72   11/16/17 144/82   07/27/17 132/82     Reviewed lifestyle modifications for blood pressure control and reduction: including making healthy food choices, managing weight, getting regular exercise, smoking cessation, reducing alcohol consumption, monitoring blood pressure regularly.     Rodney Hendrickson is not experiencing symptoms.    Follow-Up: BP is at goal of < 140/90mmHg (patient 18+ years of age with or without diabetes).  Recommended follow-up at pharmacy in 6 months.     Recommendation to Provider:     Rodney Hendrickson was evaluated for enrollment into the PGEN study today.    Patient eligible for enrollment:  No  Patient interested in enrollment:  No    Completed by: Alondra ALEGRIA.Ph.  [facility]   [phone]

## 2017-12-06 NOTE — MR AVS SNAPSHOT
"              After Visit Summary   12/6/2017    Rodney Hendrickson    MRN: [id number]           Patient Information     Date Of Birth          1945        Visit Information        Provider Department      12/6/2017 12:45 PM Brennan Mercado MD [facility]        Today's Diagnoses     Essential hypertension    -  1       Follow-ups after your visit        Your next 10 appointments already scheduled     Dec 19, 2017 11:20 AM CST   SHORT with Brennan Mercado MD   [facility]    [address]  [facility] [address] [phone]            Jun 04, 2018  8:00 AM CDT   Return Sleep Patient with Miguel Esposito MD   [facility]    [address]  [facility] [phone] [phone]              Who to contact     If you have questions or need follow up information about today's clinic visit or your schedule please contact [facility] directly at [phone].  Normal or non-critical lab and imaging results will be communicated to you by United Theological Seminaryhart, letter or phone within 4 business days after the clinic has received the results. If you do not hear from us within 7 days, please contact the clinic through [employer] The[employer] Seminaryhart or phone. If you have a critical or abnormal lab result, we will notify you by phone as soon as possible.  Submit refill requests through Memeo or call your pharmacy and they will forward the refill request to us. Please allow 3 business days for your refill to be completed.          Additional Information About Your Visit        United Theological Seminaryhart Information     Memeo lets you send messages to your doctor, view your test results, renew your prescriptions, schedule appointments and more. To sign up, go to [email or web address] . Click on \"Log in\" on the left side of the screen, which will take you to the Welcome page. Then click on \"Sign up " "Now\" on the right side of the page.     You will be asked to enter the access code listed below, as well as some personal information. Please follow the directions to create your username and password.     Your access code is: [email or web address]  Expires: 2018 10:26 AM     Your access code will  in 90 days. If you need help or a new code, please call your [place] clinic or [phone].        Care EveryWhere ID     This is your Care EveryWhere ID. This could be used by other organizations to access your [place] medical records  [id number]         Blood Pressure from Last 3 Encounters:   17 136/72   17 144/82   17 132/82    Weight from Last 3 Encounters:   17 205 lb 14.6 oz (93.4 kg)   17 206 lb (93.4 kg)   17 197 lb 1.5 oz (89.4 kg)              Today, you had the following     No orders found for display       Primary Care Provider Office Phone # Fax #    Brennan Mercado [phone]       [address] [facility] [address]        Equal Access to Services     LOLI CARRASCO : Hadii yris ku hadasho Soomaali, waaxda luqadaha, qaybta kaalmada adeegyada, ayesha charles . So [facility] [phone].    ATENCIÓN: Si habla español, tiene a andrews disposición servicios gratuitos de asistencia lingüística. Llame al [phone].    We comply with applicable federal civil rights laws and [place] laws. We do not discriminate on the basis of race, color, national origin, age, disability, sex, sexual orientation, or gender identity.            Thank you!     Thank you for choosing [facility]  for your care. Our goal is always to provide you with excellent care. Hearing back from our patients is one way we can continue to improve our services. Please take a few minutes to complete the written survey that you may receive in the mail after your visit with us. Thank you!             Your Updated Medication List - Protect others " around you: Learn how to safely use, store and throw away your medicines at www.disposemymeds.org.          This list is accurate as of: 12/6/17 12:46 PM.  Always use your most recent med list.                   Brand Name Dispense Instructions for use Diagnosis    aspirin 81 MG tablet      Take  by mouth daily.        atorvastatin 40 MG tablet    LIPITOR    90 tablet    take 1 tablet (40mg) by mouth at bedtime    Hyperlipidemia LDL goal <130       ferrous sulfate 325 (65 FE) MG tablet    IRON     Take by mouth daily (with breakfast)        finasteride 5 MG tablet    PROSCAR    90 tablet    Take 1 tablet (5 mg) by mouth daily    Benign non-nodular prostatic hyperplasia with lower urinary tract symptoms       lisinopril-hydrochlorothiazide 20-12.5 MG per tablet    PRINZIDE/ZESTORETIC    90 tablet    take 1 tablet by mouth daily    Benign essential hypertension       omeprazole 40 MG capsule    priLOSEC    90 capsule    take 1 capsule (40mg) by mouth daily. take 30 to 60 minutes before a meal.    Gastroesophageal reflux disease without esophagitis       order for DME      Equipment ordered: RESMED Auto PAP Mask type: Nasal Settings: 8-16 cmH2O        PRILOSEC PO      Take 20 mg by mouth 2 times daily (before meals)

## 2017-12-19 ENCOUNTER — RADIANT APPOINTMENT (OUTPATIENT)
Dept: GENERAL RADIOLOGY | Facility: CLINIC | Age: 72
End: 2017-12-19
Attending: INTERNAL MEDICINE
Payer: COMMERCIAL

## 2017-12-19 ENCOUNTER — OFFICE VISIT (OUTPATIENT)
Dept: INTERNAL MEDICINE | Facility: CLINIC | Age: 72
End: 2017-12-19
Payer: COMMERCIAL

## 2017-12-19 VITALS
BODY MASS INDEX: 35.51 KG/M2 | HEIGHT: 64 IN | OXYGEN SATURATION: 97 % | DIASTOLIC BLOOD PRESSURE: 68 MMHG | HEART RATE: 75 BPM | WEIGHT: 208 LBS | TEMPERATURE: 98.1 F | SYSTOLIC BLOOD PRESSURE: 128 MMHG

## 2017-12-19 DIAGNOSIS — M25.552 HIP PAIN, LEFT: ICD-10-CM

## 2017-12-19 DIAGNOSIS — N40.1 BENIGN PROSTATIC HYPERPLASIA WITH URINARY FREQUENCY: Primary | ICD-10-CM

## 2017-12-19 DIAGNOSIS — I10 ESSENTIAL HYPERTENSION: ICD-10-CM

## 2017-12-19 DIAGNOSIS — R35.0 BENIGN PROSTATIC HYPERPLASIA WITH URINARY FREQUENCY: Primary | ICD-10-CM

## 2017-12-19 LAB
ALBUMIN UR-MCNC: NEGATIVE MG/DL
APPEARANCE UR: CLEAR
BILIRUB UR QL STRIP: NEGATIVE
COLOR UR AUTO: YELLOW
GLUCOSE UR STRIP-MCNC: NEGATIVE MG/DL
HGB UR QL STRIP: NEGATIVE
KETONES UR STRIP-MCNC: NEGATIVE MG/DL
LEUKOCYTE ESTERASE UR QL STRIP: NEGATIVE
NITRATE UR QL: NEGATIVE
PH UR STRIP: 6 PH (ref 5–7)
SOURCE: NORMAL
SP GR UR STRIP: 1.01 (ref 1–1.03)
UROBILINOGEN UR STRIP-ACNC: 0.2 EU/DL (ref 0.2–1)

## 2017-12-19 PROCEDURE — 81003 URINALYSIS AUTO W/O SCOPE: CPT | Performed by: INTERNAL MEDICINE

## 2017-12-19 PROCEDURE — 72170 X-RAY EXAM OF PELVIS: CPT

## 2017-12-19 PROCEDURE — 72100 X-RAY EXAM L-S SPINE 2/3 VWS: CPT

## 2017-12-19 PROCEDURE — 99214 OFFICE O/P EST MOD 30 MIN: CPT | Performed by: INTERNAL MEDICINE

## 2017-12-19 RX ORDER — TAMSULOSIN HYDROCHLORIDE 0.4 MG/1
0.4 CAPSULE ORAL DAILY
Qty: 90 CAPSULE | Refills: 1 | Status: SHIPPED | OUTPATIENT
Start: 2017-12-19 | End: 2018-06-26

## 2017-12-19 NOTE — LETTER
December 19, 2017      Rodney Hendrickson  [address] [facility] [address]        Dear ,    We are writing to inform you of your test results.    Your test results fall within the expected range(s) or remain unchanged from previous results.  Please continue with current treatment plan.    Resulted Orders   *UA reflex to Microscopic and Culture ([place] and [facility] (except [place] and [place])   Result Value Ref Range    Color Urine Yellow     Appearance Urine Clear     Glucose Urine Negative NEG^Negative mg/dL    Bilirubin Urine Negative NEG^Negative    Ketones Urine Negative NEG^Negative mg/dL    Specific Gravity Urine 1.015 1.003 - 1.035    Blood Urine Negative NEG^Negative    pH Urine 6.0 5.0 - 7.0 pH    Protein Albumin Urine Negative NEG^Negative mg/dL    Urobilinogen Urine 0.2 0.2 - 1.0 EU/dL    Nitrite Urine Negative NEG^Negative    Leukocyte Esterase Urine Negative NEG^Negative    Source Midstream Urine        If you have any questions or concerns, please call the clinic at the number listed above.       Sincerely,    Brennan Mercado MD

## 2017-12-19 NOTE — PROGRESS NOTES
SUBJECTIVE:   Rodney Hendrickson is a 72 year old male who presents to clinic today for the following health issues:      Frequent urination for 3 months:  Lt hip/leg pain:    Presents with increased urinary frequency for several months. Has h/o BPH. On Proscar treatment. Gradually progressive symptoms of slow stream, nocturia and day time frequency every 2-3 hours.   No pain with urination, hematuria, flank or suprapubic pain.     Also reports left hip pain for 2 months. With climbing up stairs, turning in bed. Located in the left lateral hip area, SI area with radiation to the anterior thigh. No h/o injury. No numbness, weakness, tingling in the leg.     PROBLEMS TO ADD ON...    Has h/o HTN. on medical treatment. BP has been controlled. No side effects from medications. No CP, HA, dizziness. good compliance with medications and low salt diet.      Problem list and histories reviewed & adjusted, as indicated.  Additional history: as documented    Patient Active Problem List   Diagnosis     Benign non-nodular prostatic hyperplasia with lower urinary tract symptoms     Hyperlipidemia LDL goal <130     Hypertension     Morbid obesity due to excess calories (H)     DOMINIK (obstructive sleep apnea)     Past Surgical History:   Procedure Laterality Date     ARTHROPLASTY PATELLO-FEMORAL (KNEE)      right     COLONOSCOPY       COLONOSCOPY N/A 1/18/2017    Procedure: COLONOSCOPY;  Surgeon: Geoffrey Gallegos MD;  Location: [facility]     ESOPHAGOSCOPY, GASTROSCOPY, DUODENOSCOPY (EGD), COMBINED  5/3/2013    Procedure: COMBINED ESOPHAGOSCOPY, GASTROSCOPY, DUODENOSCOPY (EGD), BIOPSY SINGLE OR MULTIPLE;  ESOPHAGOSCOPY, GASTROSCOPY, DUODENOSCOPY (EGD) with biopies;  Surgeon: Geoffrey Gallegos MD;  Location: [facility]     ESOPHAGOSCOPY, GASTROSCOPY, DUODENOSCOPY (EGD), COMBINED N/A 1/8/2016    Procedure: COMBINED ESOPHAGOSCOPY, GASTROSCOPY, DUODENOSCOPY (EGD), BIOPSY SINGLE OR MULTIPLE;  Surgeon: Geoffrey Gallegos MD;  Location: [facility]      "ESOPHAGOSCOPY, GASTROSCOPY, DUODENOSCOPY (EGD), COMBINED N/A 2017    Procedure: COMBINED ESOPHAGOSCOPY, GASTROSCOPY, DUODENOSCOPY (EGD), BIOPSY SINGLE OR MULTIPLE;  Surgeon: Geoffrey Gallegos MD;  Location: [facility]     ROTATOR CUFF REPAIR RT/LT      right and left       Social History   Substance Use Topics     Smoking status: Former Smoker     Quit date: 1983     Smokeless tobacco: Never Used     Alcohol use Yes      Comment: occa     Family History   Problem Relation Age of Onset     Hypertension Father      Colon Cancer Father       of Colon CA     Heart Defect Father      Enlarged heart     Hypertension Brother      Hypertension Sister      Hypertension Brother          Current Outpatient Prescriptions   Medication Sig Dispense Refill     tamsulosin (FLOMAX) 0.4 MG capsule Take 1 capsule (0.4 mg) by mouth daily 90 capsule 1     lisinopril-hydrochlorothiazide (PRINZIDE/ZESTORETIC) 20-12.5 MG per tablet take 1 tablet by mouth daily 90 tablet 1     atorvastatin (LIPITOR) 40 MG tablet take 1 tablet (40mg) by mouth at bedtime 90 tablet 1     omeprazole (PRILOSEC) 40 MG capsule take 1 capsule (40mg) by mouth daily. take 30 to 60 minutes before a meal. 90 capsule 1     ferrous sulfate (IRON) 325 (65 FE) MG tablet Take by mouth At Bedtime        finasteride (PROSCAR) 5 MG tablet Take 1 tablet (5 mg) by mouth daily 90 tablet 3     Omeprazole (PRILOSEC PO) Take 20 mg by mouth 2 times daily (before meals)        aspirin 81 MG tablet Take  by mouth daily.           Reviewed and updated as needed this visit by clinical staff       Reviewed and updated as needed this visit by Provider         ROS:  Constitutional, HEENT, cardiovascular, pulmonary, gi and gu systems are negative, except as otherwise noted.      OBJECTIVE:   /68  Pulse 75  Temp 98.1  F (36.7  C) (Oral)  Ht 5' 4\" (1.626 m)  Wt 208 lb (94.3 kg)  SpO2 97%  BMI 35.7 kg/m2  Body mass index is 35.7 kg/(m^2).   GENERAL: healthy, alert and no " distress  NECK: no adenopathy, no asymmetry, masses, or scars and thyroid normal to palpation  RESP: lungs clear to auscultation - no rales, rhonchi or wheezes  CV: regular rate and rhythm, normal S1 S2, no S3 or S4, no murmur, click or rub, no peripheral edema and peripheral pulses strong  ABDOMEN: soft, nontender, no hepatosplenomegaly, no masses and bowel sounds normal  RECTAL (male): normal sphincter tone, no rectal masses, prostate 3+ normal size, smooth, nontender without nodules or masses  MS: no gross musculoskeletal defects noted, no edema, normal ROM in the hip joints. Palpatory tenderness left SI area     Diagnostic Test Results:  none     ASSESSMENT/PLAN:     Problem List Items Addressed This Visit     Hypertension      Other Visit Diagnoses     Benign prostatic hyperplasia with urinary frequency    -  Primary    Relevant Medications    tamsulosin (FLOMAX) 0.4 MG capsule    Other Relevant Orders    *UA reflex to Microscopic and Culture ([place] and [facility] (except [place] and [place]) (Completed)    Hip pain, left        Relevant Orders    XR Pelvis 1/2 Views    XR Lumbar Spine 2/3 Views           Symptoms of progressive BPH , will add Flomax, advised for side effects, continue Proscar.   Assess for possible UTI  Likely left radicular pain, possible SI joint, hip related, assess X rays, clinically is improved, PRN NSAID  Consider PT  Cont treatment for HTN, controlled.     Follow-Up:with results }    Brennan Mercado MD  [facility]

## 2017-12-19 NOTE — LETTER
December 19, 2017      Rodney Hendrickson  [address] [facility] [address]        Dear ,    We are writing to inform you of your test results.    Your test results fall within the expected range(s) or remain unchanged from previous results.  Please continue with current treatment plan.    Resulted Orders   *UA reflex to Microscopic and Culture ([place] and [facility] (except [place] and [place])   Result Value Ref Range    Color Urine Yellow     Appearance Urine Clear     Glucose Urine Negative NEG^Negative mg/dL    Bilirubin Urine Negative NEG^Negative    Ketones Urine Negative NEG^Negative mg/dL    Specific Gravity Urine 1.015 1.003 - 1.035    Blood Urine Negative NEG^Negative    pH Urine 6.0 5.0 - 7.0 pH    Protein Albumin Urine Negative NEG^Negative mg/dL    Urobilinogen Urine 0.2 0.2 - 1.0 EU/dL    Nitrite Urine Negative NEG^Negative    Leukocyte Esterase Urine Negative NEG^Negative    Source Midstream Urine        If you have any questions or concerns, please call the clinic at the number listed above.       Sincerely,        Brennan Mercado MD

## 2017-12-19 NOTE — MR AVS SNAPSHOT
"              After Visit Summary   12/19/2017    Rodney Hendrickson    MRN: [id number]           Patient Information     Date Of Birth          1945        Visit Information        Provider Department      12/19/2017 11:20 AM Brennan Mercado MD [facility]        Today's Diagnoses     Benign prostatic hyperplasia with urinary frequency    -  1    Hip pain, left        Essential hypertension           Follow-ups after your visit        Your next 10 appointments already scheduled     Jun 04, 2018  8:00 AM CDT   Return Sleep Patient with Miguel Esposito MD   [facility] ([facility])    [address] [address]  [facility] [id number] [phone]              Who to contact     If you have questions or need follow up information about today's clinic visit or your schedule please contact [facility] directly at [phone].  Normal or non-critical lab and imaging results will be communicated to you by MyChart, letter or phone within 4 business days after the clinic has received the results. If you do not hear from us within 7 days, please contact the clinic through MyChart or phone. If you have a critical or abnormal lab result, we will notify you by phone as soon as possible.  Submit refill requests through [employer] or call your pharmacy and they will forward the refill request to us. Please allow 3 business days for your refill to be completed.          Additional Information About Your Visit        MyChart Information     [employer] lets you send messages to your doctor, view your test results, renew your prescriptions, schedule appointments and more. To sign up, go to [email or web address] . Click on \"Log in\" on the left side of the screen, which will take you to the Welcome page. Then click on \"Sign up Now\" on the right side of the page.     You will be asked to enter the access code listed below, as well as some " "personal information. Please follow the directions to create your username and password.     Your access code is: [email or web address]  Expires: 2018 10:26 AM     Your access code will  in 90 days. If you need help or a new code, please call your [facility] clinic or [phone].        Care EveryWhere ID     This is your Care EveryWhere ID. This could be used by other organizations to access your [facility] medical records  [id number]        Your Vitals Were     Pulse Temperature Height Pulse Oximetry BMI (Body Mass Index)       75 98.1  F (36.7  C) (Oral) 5' 4\" (1.626 m) 97% 35.7 kg/m2        Blood Pressure from Last 3 Encounters:   17 128/68   17 136/72   17 144/82    Weight from Last 3 Encounters:   17 208 lb (94.3 kg)   17 205 lb 14.6 oz (93.4 kg)   17 206 lb (93.4 kg)              We Performed the Following     *UA reflex to Microscopic and Culture ([place] and [facility] (except [place] and [place])          Today's Medication Changes          These changes are accurate as of: 17 12:58 PM.  If you have any questions, ask your nurse or doctor.               Start taking these medicines.        Dose/Directions    tamsulosin 0.4 MG capsule   Commonly known as:  FLOMAX   Used for:  Benign prostatic hyperplasia with urinary frequency   Started by:  Brennan Mercado MD        Dose:  0.4 mg   Take 1 capsule (0.4 mg) by mouth daily   Quantity:  90 capsule   Refills:  1            Where to get your medicines      These medications were sent to [id number] - [place], [place] - [address] [address]  [address] [facility] [address]     Phone:  [phone]     tamsulosin 0.4 MG capsule                Primary Care Provider Office Phone # Fax #    Brennan Mercado [phone]       [address] [facility] [address]        Equal Access to Services     [facility] DANILO AH: Saundra Wiggins, watrda luvitaadahenrietta, qaybta kaalmamaya " ayesha morenorajiv lorettaanna yostaadiandra ah. Je[facility] [phone].    ATENCIÓN: Si habla gwen, tiene a andrews disposición servicios gratuitos de asistencia lingüística. Johnathan al [phone].    We comply with applicable federal civil rights laws and [place] laws. We do not discriminate on the basis of race, color, national origin, age, disability, sex, sexual orientation, or gender identity.            Thank you!     Thank you for choosing [facility]  for your care. Our goal is always to provide you with excellent care. Hearing back from our patients is one way we can continue to improve our services. Please take a few minutes to complete the written survey that you may receive in the mail after your visit with us. Thank you!             Your Updated Medication List - Protect others around you: Learn how to safely use, store and throw away your medicines at www.disposemymeds.org.          This list is accurate as of: 12/19/17 12:58 PM.  Always use your most recent med list.                   Brand Name Dispense Instructions for use Diagnosis    aspirin 81 MG tablet      Take  by mouth daily.        atorvastatin 40 MG tablet    LIPITOR    90 tablet    take 1 tablet (40mg) by mouth at bedtime    Hyperlipidemia LDL goal <130       ferrous sulfate 325 (65 FE) MG tablet    IRON     Take by mouth At Bedtime        finasteride 5 MG tablet    PROSCAR    90 tablet    Take 1 tablet (5 mg) by mouth daily    Benign non-nodular prostatic hyperplasia with lower urinary tract symptoms       lisinopril-hydrochlorothiazide 20-12.5 MG per tablet    PRINZIDE/ZESTORETIC    90 tablet    take 1 tablet by mouth daily    Benign essential hypertension       omeprazole 40 MG capsule    priLOSEC    90 capsule    take 1 capsule (40mg) by mouth daily. take 30 to 60 minutes before a meal.    Gastroesophageal reflux disease without esophagitis       PRILOSEC PO      Take 20 mg by mouth 2 times daily (before meals)         tamsulosin 0.4 MG capsule    FLOMAX    90 capsule    Take 1 capsule (0.4 mg) by mouth daily    Benign prostatic hyperplasia with urinary frequency

## 2017-12-19 NOTE — NURSING NOTE
"Chief Complaint   Patient presents with     Urinary Problem     Pt C/O ongoing frequent urination      Pain     Lt hip/leg pain       Initial /68  Pulse 75  Temp 98.1  F (36.7  C) (Oral)  Ht 5' 4\" (1.626 m)  Wt 208 lb (94.3 kg)  SpO2 97%  BMI 35.7 kg/m2 Estimated body mass index is 35.7 kg/(m^2) as calculated from the following:    Height as of this encounter: 5' 4\" (1.626 m).    Weight as of this encounter: 208 lb (94.3 kg).  Medication Reconciliation: complete   Deyanira Mackey MA      "

## 2018-02-13 ENCOUNTER — HOSPITAL ENCOUNTER (OUTPATIENT)
Facility: CLINIC | Age: 73
Discharge: HOME | End: 2018-02-13
Attending: INTERNAL MEDICINE | Admitting: INTERNAL MEDICINE
Payer: MEDICARE

## 2018-02-13 VITALS
RESPIRATION RATE: 16 BRPM | OXYGEN SATURATION: 95 % | DIASTOLIC BLOOD PRESSURE: 83 MMHG | SYSTOLIC BLOOD PRESSURE: 141 MMHG

## 2018-02-13 LAB — UPPER GI ENDOSCOPY: NORMAL

## 2018-02-13 PROCEDURE — 40000104 ZZH STATISTIC MODERATE SEDATION < 10 MIN: Performed by: INTERNAL MEDICINE

## 2018-02-13 PROCEDURE — 25000128 H RX IP 250 OP 636: Performed by: INTERNAL MEDICINE

## 2018-02-13 PROCEDURE — 88305 TISSUE EXAM BY PATHOLOGIST: CPT | Performed by: INTERNAL MEDICINE

## 2018-02-13 PROCEDURE — 43239 EGD BIOPSY SINGLE/MULTIPLE: CPT | Performed by: INTERNAL MEDICINE

## 2018-02-13 PROCEDURE — 25000125 ZZHC RX 250: Performed by: INTERNAL MEDICINE

## 2018-02-13 PROCEDURE — 88305 TISSUE EXAM BY PATHOLOGIST: CPT | Mod: 26 | Performed by: INTERNAL MEDICINE

## 2018-02-13 RX ORDER — ONDANSETRON 2 MG/ML
4 INJECTION INTRAMUSCULAR; INTRAVENOUS
Status: DISCONTINUED | OUTPATIENT
Start: 2018-02-13 | End: 2018-02-13 | Stop reason: HOSPADM

## 2018-02-13 RX ORDER — LIDOCAINE 40 MG/G
CREAM TOPICAL
Status: DISCONTINUED | OUTPATIENT
Start: 2018-02-13 | End: 2018-02-13 | Stop reason: HOSPADM

## 2018-02-13 RX ORDER — FLUMAZENIL 0.1 MG/ML
0.2 INJECTION, SOLUTION INTRAVENOUS
Status: DISCONTINUED | OUTPATIENT
Start: 2018-02-13 | End: 2018-02-13 | Stop reason: HOSPADM

## 2018-02-13 RX ORDER — FENTANYL CITRATE 50 UG/ML
INJECTION, SOLUTION INTRAMUSCULAR; INTRAVENOUS PRN
Status: DISCONTINUED | OUTPATIENT
Start: 2018-02-13 | End: 2018-02-13 | Stop reason: HOSPADM

## 2018-02-13 RX ORDER — ONDANSETRON 2 MG/ML
4 INJECTION INTRAMUSCULAR; INTRAVENOUS EVERY 6 HOURS PRN
Status: DISCONTINUED | OUTPATIENT
Start: 2018-02-13 | End: 2018-02-13 | Stop reason: HOSPADM

## 2018-02-13 RX ORDER — NALOXONE HYDROCHLORIDE 0.4 MG/ML
.1-.4 INJECTION, SOLUTION INTRAMUSCULAR; INTRAVENOUS; SUBCUTANEOUS
Status: DISCONTINUED | OUTPATIENT
Start: 2018-02-13 | End: 2018-02-13 | Stop reason: HOSPADM

## 2018-02-13 RX ORDER — ONDANSETRON 4 MG/1
4 TABLET, ORALLY DISINTEGRATING ORAL EVERY 6 HOURS PRN
Status: DISCONTINUED | OUTPATIENT
Start: 2018-02-13 | End: 2018-02-13 | Stop reason: HOSPADM

## 2018-02-13 NOTE — IP AVS SNAPSHOT
MRN:[id number]                      After Visit Summary   2/13/2018    Rodney Hendrickson    MRN: [id number]           Thank you!     Thank you for choosing [facility] for your care. Our goal is always to provide you with excellent care. Hearing back from our patients is one way we can continue to improve our services. Please take a few minutes to complete the written survey that you may receive in the mail after you visit. If you would like to speak to someone directly about your visit please contact Patient Relations at [phone]. Thank you!          Patient Information     Date Of Birth          1945        About your hospital stay     You were admitted on:  February 13, 2018 You last received care in the:  [facility]    You were discharged on:  February 13, 2018       Who to Call     For medical emergencies, please call 911.  For non-urgent questions about your medical care, please call your primary care provider or clinic, [phone]  For questions related to your surgery, please call your surgery clinic        Attending Provider     Provider Specialty    Geoffrey Gallegos MD Gastroenterology       Primary Care Provider Office Phone # Fax #    Brennan Mercado [phone]      Your next 10 appointments already scheduled     Jun 04, 2018  8:00 AM CDT   Return Sleep Patient with Miguel Esposito MD   [facility] ([facility])    [address] [address] [address] [phone]              Further instructions from your care team       The patient has received a copy of the Provation  report the doctor has written and discharge instructions have been discussed with the patient and responsible adult.  All questions were addressed and answered prior to patient discharge.    Pending Results     No orders found from 2/11/2018 to 2/14/2018.            Admission Information      "Date & Time Provider Department Dept. Phone    2018 Geoffrey Gallegos MD [facility] [phone]      Your Vitals Were     Blood Pressure Respirations Pulse Oximetry             114/72 12 97%         MyChart Information     [employer] lets you send messages to your doctor, view your test results, renew your prescriptions, schedule appointments and more. To sign up, go to [email or web address] . Click on \"Log in\" on the left side of the screen, which will take you to the Welcome page. Then click on \"Sign up Now\" on the right side of the page.     You will be asked to enter the access code listed below, as well as some personal information. Please follow the directions to create your username and password.     Your access code is: [email or web address]  Expires: 2018 10:26 AM     Your access code will  in 90 days. If you need help or a new code, please call your [facility] clinic or [phone].        Care EveryWhere ID     This is your Care EveryWhere ID. This could be used by other organizations to access your [facility] medical records  [id number]        Equal Access to Services     ZEN CARRASCO AH: Hadii yris Wiggins, waaxda luriri, qaybta kaalmada adetavo, ayesha tsang. So [facility] [phone].    ATENCIÓN: Si habla español, tiene a andrews disposición servicios gratuitos de asistencia lingüística. Johnathan al [phone].    We comply with applicable federal civil rights laws and [place] laws. We do not discriminate on the basis of race, color, national origin, age, disability, sex, sexual orientation, or gender identity.               Review of your medicines      CONTINUE these medicines which have NOT CHANGED        Dose / Directions    aspirin 81 MG tablet        Take  by mouth daily.   Refills:  0       atorvastatin 40 MG tablet   Commonly known as:  LIPITOR   Used for:  Hyperlipidemia LDL goal <130        take 1 tablet (40mg) by mouth at bedtime "   Quantity:  90 tablet   Refills:  1       ferrous sulfate 325 (65 FE) MG tablet   Commonly known as:  IRON        Take by mouth At Bedtime   Refills:  0       finasteride 5 MG tablet   Commonly known as:  PROSCAR   Used for:  Benign non-nodular prostatic hyperplasia with lower urinary tract symptoms        Dose:  5 mg   Take 1 tablet (5 mg) by mouth daily   Quantity:  90 tablet   Refills:  3       lisinopril-hydrochlorothiazide 20-12.5 MG per tablet   Commonly known as:  PRINZIDE/ZESTORETIC   Used for:  Benign essential hypertension        take 1 tablet by mouth daily   Quantity:  90 tablet   Refills:  1       omeprazole 40 MG capsule   Commonly known as:  priLOSEC   Used for:  Gastroesophageal reflux disease without esophagitis        take 1 capsule (40mg) by mouth daily. take 30 to 60 minutes before a meal.   Quantity:  90 capsule   Refills:  1       PRILOSEC PO        Dose:  20 mg   Take 20 mg by mouth 2 times daily (before meals)   Refills:  0       tamsulosin 0.4 MG capsule   Commonly known as:  FLOMAX   Used for:  Benign prostatic hyperplasia with urinary frequency        Dose:  0.4 mg   Take 1 capsule (0.4 mg) by mouth daily   Quantity:  90 capsule   Refills:  1                Protect others around you: Learn how to safely use, store and throw away your medicines at www.disposemymeds.org.             Medication List: This is a list of all your medications and when to take them. Check marks below indicate your daily home schedule. Keep this list as a reference.      Medications           Morning Afternoon Evening Bedtime As Needed    aspirin 81 MG tablet   Take  by mouth daily.                                atorvastatin 40 MG tablet   Commonly known as:  LIPITOR   take 1 tablet (40mg) by mouth at bedtime                                ferrous sulfate 325 (65 FE) MG tablet   Commonly known as:  IRON   Take by mouth At Bedtime                                finasteride 5 MG tablet   Commonly known as:  PROSCAR    Take 1 tablet (5 mg) by mouth daily                                lisinopril-hydrochlorothiazide 20-12.5 MG per tablet   Commonly known as:  PRINZIDE/ZESTORETIC   take 1 tablet by mouth daily                                omeprazole 40 MG capsule   Commonly known as:  priLOSEC   take 1 capsule (40mg) by mouth daily. take 30 to 60 minutes before a meal.                                PRILOSEC PO   Take 20 mg by mouth 2 times daily (before meals)                                tamsulosin 0.4 MG capsule   Commonly known as:  FLOMAX   Take 1 capsule (0.4 mg) by mouth daily

## 2018-02-13 NOTE — LETTER
January 26, 2018      Rodney Hendrickson  [address] [address]      Thank you for choosing [facility]. You are scheduled for the following service.   Date:  2/13/2018 Tuesday       Procedure: COLONOSCOPY  Doctor:   Dr. Geoffrey Gallegos         Arrival Time:  11:30 AM   *check in at Emergency/Endoscopy desk*  Procedure Time:  12:00 AM    Location:   [facility]        Endoscopy Department, First Floor (Enter through ER Doors) *         [address] [place], [place] [address]      [phone] or [phone] () to reschedule    NuLYTELY  PREP  Colonoscopy is the most accurate test to detect colon polyps and colon cancer; and the only test where polyps can be removed. During this procedure, a doctor examines the lining of your large intestine and rectum through a flexible tube.     Transportation  Arrange for a ride for the day of your procedures with a responsible adult.  A taxi ride is not an option unless you are accompanied by a responsible adult. If you fail to arrange transportation with a responsible adult, your procedure will be cancelled and rescheduled.    Fill your enclosed prescription for NuLYTELY  at your local pharmacy. Please call our office at [phone] if you did not receive a prescription.      PREPARATION FOR COLONOSCOPY    7 days before:    Discontinue fiber supplements and medications containing iron. This includes Metamucil  and Fibercon ; and multivitamins with iron.  3 days before:    Begin a low-fiber diet. A low-fiber diet helps making the cleanout more effective. For additional details on low-fiber diet, please refer to the table on the last page.  2 days before:    Continue the low-fiber diet.     Drink at least 8 glasses of water throughout the day.     Stop eating solid foods at 11:45 pm.  1 day before:    In the morning: begin a clear liquid diet (liquids you can see through).     Examples of a clear liquid  diet include: water, clear broth or bouillon, Gatorade, Pedialyte or Powerade, carbonated and non-carbonated soft drinks (Sprite , 7-Up , ginger ale), strained fruit juices without pulp (apple, white grape, white cranberry), Jell-O  and popsicles.     The following are not allowed on a clear liquid diet: red liquids, alcoholic beverages, coffee, dairy products (milk, creamer, and yogurt), protein shakes, creamy broths, juice with pulp and chewing tobacco.    At 4pm: drink 1 (one) 8 oz glass of NuLYTELY  solution every 15 minutes until half the bottle (approximately 8 glasses of 8 oz) is gone. Keep the solution refrigerated. Do not drink any other liquids while you are drinking the NuLYTELY  solution.    Over the course of the evening, drink an additional   liter of clear liquids and continue clear liquid diet.    COLON CLEANSING TIPS: drink adequate amounts of fluids before and after your colon cleansing to prevent dehydration. Stay near a toilet because you will have diarrhea. Even if you are sitting on the toilet, continue to drink the cleansing solution every 15 minutes. If you feel nauseous or vomit, rinse your mouth with water, take a 15 to 30-minute-break and then continue drinking the solution. You will be uncomfortable until the stool has flushed from your colon (in about 2 to 4 hours). You may feel chilled.    DAY OF YOUR PROCEDURE  You may take all of your morning medications including blood pressure medications, blood thinners (if you have not been instructed to stop these by our office), methadone, and anti-seizure medications with sips of water 3 hours prior to your procedure or earlier. Do not take insulin or vitamins prior to your procedure. Continue the clear liquid diet.    6 hours prior: drink 1 (one) 8 oz glass of NuLYTELY  solution every 15 minutes until the remaining solution (approximately 8 glasses of 8 oz) is gone. Keep the solution refrigerated.      4 hours prior:   o STOP consuming all  liquids   o Do not take anything by mouth during this time.   o Allow extra time to travel to your procedure as you may need to stop and use a restroom along the way.  You are ready for the procedure, if you followed all instructions and your stool is no longer formed, but clear or yellow liquid. If you are unsure whether your colon is clean, please call our office at [phone] before you leave for your appointment.    Bring the following to your procedure:  - Insurance Card/Photo ID.   - List of current medications including over-the-counter medications and supplements.   - Your rescue inhaler if you currently use one to control asthma.    Canceling or rescheduling your appointment:   If you must cancel or reschedule your appointment, please call [phone] as soon as possible.    COLONOSCOPY PRE-PROCEDURE CHECKLIST  If you have diabetes, ask your regular doctor for diet and medication restrictions.  If you take an anticoagulant or anti-platelet medication (such as Coumadin , Lovenox , Pradaxa , Xarelto , Eliquis , etc.), please call your primary doctor for advice on holding this medication.  If you take aspirin you may continue to do so.  If you are or may be pregnant, please discuss the risks and benefits of this procedure with your doctor.    What happens during a colonoscopy?    Plan to spend up to two hours, starting at registration time, at the endoscopy center the day of your procedure. The colonoscopy takes an average of 15 to 30 minutes. Recovery time is about 30 minutes.    Before the exam:    You will change into a gown.    Your medical history and medication list will be reviewed with you, unless that has been done over the phone prior to the procedure.     A nurse will insert an intravenous (IV) line into your hand or arm.    The doctor will meet with you and will give you a consent form to sign.    During the exam:     Medicine will be given through the IV line to help you relax.     Your heart  rate and oxygen levels will be monitored. If your blood pressure is low, you may be given fluids through the IV line.     The doctor will insert a flexible hollow tube, called a colonoscope, into your rectum. The scope will be advanced slowly through the large intestine (colon).    You may have a feeling of fullness or pressure.     If an abnormal tissue or a polyp is found, the doctor may remove it through the endoscope for closer examination, or biopsy. Tissue removal is painless.    After the exam:           Any tissue samples removed during the exam will be sent to a lab for evaluation. It may take 5-7 working days for you to be notified of the results.     A nurse will provide you with complete discharge instructions before you leave the endoscopy center. Be sure to ask the nurse for specific instructions if you take blood thinners such as Aspirin, Coumadin or Plavix.     The doctor will prepare a full report for you and for the physician who referred you for the procedure.     Your doctor will talk with you about the initial results of your exam.      Medication given during the exam will prohibit you from driving for the rest of the day.     Following the exam, you may resume your normal diet. Your first meal should be light, no greasy foods. Avoid alcohol until the next day.     You may resume your regular activities the day after the procedure.     LOW-FIBER DIET  Foods RECOMMENDED Foods to AVOID   Breads, Cereal, Rice and Pasta:   White bread, rolls, biscuits, croissant and tyler toast.   Waffles, Pakistani toast and pancakes.   White rice, noodles, pasta, macaroni and peeled cooked potatoes.   Plain crackers and saltines.   Cooked cereals: farina, cream of rice.   Cold cereals: Puffed Rice , Rice Krispies , Corn Flakes  and Special K    Breads, Cereal, Rice and Pasta:   Breads or rolls with nuts, seeds or fruit.   Whole wheat, pumpernickel, rye breads and cornbread.   Potatoes with skin, brown or wild rice,  and kasha (buckwheat).     Vegetables:   Tender cooked and canned vegetables without seeds: carrots, asparagus tips, green or wax beans, pumpkin, spinach, lima beans. Vegetables:   Raw or steamed vegetables.   Vegetables with seeds.   Sauerkraut.   Winter squash, peas, broccoli, Brussel sprouts, cabbage, onions, cauliflower, baked beans, peas and corn.   Fruits:   Strained fruit juice.   Canned fruit, except pineapple.   Ripe bananas and melon. Fruits:   Prunes and prune juice.   Raw fruits.   Dried fruits: figs, dates and raisins.   Milk/Dairy:   Milk: plain or flavored.   Yogurt, custard and ice cream.   Cheese and cottage cheese Milk/Dairy:     Meat and other proteins:   ground, well-cooked tender beef, lamb, ham, veal, pork, fish, poultry and organ meats.   Eggs.   Peanut butter without nuts. Meat and other proteins:   Tough, fibrous meats with gristle.   Dry beans, peas and lentils.   Peanut butter with nuts.   Tofu.   Fats, Snack, Sweets, Condiments and Beverages:   Margarine, butter, oils, mayonnaise, sour cream and salad dressing, plain gravy.   Sugar, hard candy, clear jelly, honey and syrup.   Spices, cooked herbs, bouillon, broth and soups made with allowed vegetable, ketchup and mustard.   Coffee, tea and carbonated drinks.   Plain cakes, cookies and pretzels.   Gelatin, plain puddings, custard, ice cream, sherbet and popsicles. Fats, Snack, Sweets, Condiments and Beverages:   Nuts, seeds and coconut.   Jam, marmalade and preserves.   Pickles, olives, relish and horseradish.   All desserts containing nuts, seeds, dried fruit and coconut; or made from whole grains or bran.   Candy made with nuts or seeds.   Popcorn.       DIRECTIONS TO THE ENDOSCOPY DEPARTMENT    From the north ([facility])  Take [address] South, exit on [address] . Get into the left hand daniela, turn left (east), go one-half mile to [address] and turn left. Go north to the first stoplight, take a right on  [address] and follow it to the Emergency entrance.    From the south ([facility])  Take 35N to the 35E split and exit on [address] . On [address] , turn left (west) to [address]. Turn right (north) on [address]. Go north to the first stoplight, take a right on [address] and follow it to the Emergency entrance.    From the east via 35E ([facility])  Take 35E south to [place] [address] exit. Turn right on [address] . Go west to [address]. Turn right (north) on [address]. Go to the first stoplight, take a right and follow on [address] to the Emergency entrance.    From the east via [address] ([facility])  Take [address] to [address]. Turn left (south) on [address] to [address]. Turn left (east) on [address] and follow it to the Emergency entrance.    From the west via [address] 13 ([place], [place])  Take [address] east to [address]. Turn right (south) on [address] to [address]. Turn left (east) on [address] and follow it to the Emergency entrance.

## 2018-02-13 NOTE — H&P
Pre-Endoscopy History and Physical     Rodney Hendrickson MRN# [id number]   YOB: 1945 Age: 72 year old     Date of Procedure: 2/13/2018  Primary care provider: Brennan Mercado  Type of Endoscopy: Gastroscopy with possible biopsy, possible dilation  Reason for Procedure: Cardenas's  Type of Anesthesia Anticipated: Conscious Sedation    HPI:    Rodney is a 72 year old male who will be undergoing the above procedure.      A history and physical has been performed. The patient's medications and allergies have been reviewed. The risks and benefits of the procedure and the sedation options and risks were discussed with the patient.  All questions were answered and informed consent was obtained.      He denies a personal or family history of anesthesia complications or bleeding disorders.     Patient Active Problem List   Diagnosis     Benign non-nodular prostatic hyperplasia with lower urinary tract symptoms     Hyperlipidemia LDL goal <130     Hypertension     Morbid obesity due to excess calories (H)     DOMINIK (obstructive sleep apnea)        Past Medical History:   Diagnosis Date     BPH (benign prostatic hyperplasia)      GERD (gastroesophageal reflux disease)      Hyperlipidemia LDL goal <130      Hypertension         Past Surgical History:   Procedure Laterality Date     ARTHROPLASTY PATELLO-FEMORAL (KNEE)      right knee replacement     COLONOSCOPY       COLONOSCOPY N/A 1/18/2017    Procedure: COLONOSCOPY;  Surgeon: Geoffrey Gallegos MD;  Location:  GI     ESOPHAGOSCOPY, GASTROSCOPY, DUODENOSCOPY (EGD), COMBINED  5/3/2013    Procedure: COMBINED ESOPHAGOSCOPY, GASTROSCOPY, DUODENOSCOPY (EGD), BIOPSY SINGLE OR MULTIPLE;  ESOPHAGOSCOPY, GASTROSCOPY, DUODENOSCOPY (EGD) with biopies;  Surgeon: Geoffrey Gallegos MD;  Location:  GI     ESOPHAGOSCOPY, GASTROSCOPY, DUODENOSCOPY (EGD), COMBINED N/A 1/8/2016    Procedure: COMBINED ESOPHAGOSCOPY, GASTROSCOPY, DUODENOSCOPY (EGD), BIOPSY SINGLE OR MULTIPLE;   Surgeon: Geoffrey Gallegos MD;  Location:  GI     ESOPHAGOSCOPY, GASTROSCOPY, DUODENOSCOPY (EGD), COMBINED N/A 2017    Procedure: COMBINED ESOPHAGOSCOPY, GASTROSCOPY, DUODENOSCOPY (EGD), BIOPSY SINGLE OR MULTIPLE;  Surgeon: Geoffrey Gallegos MD;  Location: [facility]     ROTATOR CUFF REPAIR RT/LT      right and left       Social History   Substance Use Topics     Smoking status: Former Smoker     Quit date: 1983     Smokeless tobacco: Never Used     Alcohol use Yes      Comment: occa       Family History   Problem Relation Age of Onset     Hypertension Father      Colon Cancer Father       of Colon CA     Heart Defect Father      Enlarged heart     Hypertension Brother      Hypertension Sister      Hypertension Brother        Prior to Admission medications    Medication Sig Start Date End Date Taking? Authorizing Provider   lisinopril-hydrochlorothiazide (PRINZIDE/ZESTORETIC) 20-12.5 MG per tablet take 1 tablet by mouth daily 17  Yes Brennan Mercado MD   finasteride (PROSCAR) 5 MG tablet Take 1 tablet (5 mg) by mouth daily 3/14/17  Yes Brennan Mercado MD   tamsulosin (FLOMAX) 0.4 MG capsule Take 1 capsule (0.4 mg) by mouth daily 17   Brennan Mercado MD   atorvastatin (LIPITOR) 40 MG tablet take 1 tablet (40mg) by mouth at bedtime 17   Brennan Mercado MD   omeprazole (PRILOSEC) 40 MG capsule take 1 capsule (40mg) by mouth daily. take 30 to 60 minutes before a meal. 17   Brennan Mercado MD   ferrous sulfate (IRON) 325 (65 FE) MG tablet Take by mouth At Bedtime     Reported, Patient   Omeprazole (PRILOSEC PO) Take 20 mg by mouth 2 times daily (before meals)     Reported, Patient   aspirin 81 MG tablet Take  by mouth daily.    Reported, Patient       No Known Allergies     REVIEW OF SYSTEMS:   5 point ROS negative except as noted above in HPI, including Gen., Resp., CV, GI &  system review.    PHYSICAL EXAM:   There were no vitals taken for this visit. Estimated  "body mass index is 35.7 kg/(m^2) as calculated from the following:    Height as of 12/19/17: 1.626 m (5' 4\").    Weight as of 12/19/17: 94.3 kg (208 lb).   GENERAL APPEARANCE: alert, and oriented  MENTAL STATUS: alert  AIRWAY EXAM: Mallampatti Class I (visualization of the soft palate, fauces, uvula, anterior and posterior pillars)  RESP: lungs clear to auscultation - no rales, rhonchi or wheezes  CV: regular rates and rhythm  DIAGNOSTICS:    Not indicated    IMPRESSION   ASA Class 2 - Mild systemic disease    PLAN:   Plan for Gastroscopy with possible biopsy, possible dilation. We discussed the risks, benefits and alternatives and the patient wished to proceed.    The above has been forwarded to the consulting provider.      Signed Electronically by: Geoffrey Gallegos  February 13, 2018          "

## 2018-02-13 NOTE — LETTER
February 2, 2018      Rodney Hendrickson  [address] [address]        Dear Rodney,         Thank you for choosing [facility]. You are scheduled for the following service.   Date:   February 13, 2018 - Tuesday      Procedure: UPPER ENDOSCOPY-EGD  Doctor: Geoffrey Gallegos           Arrival Time:  11:30 am    *check in at Emergency/Endoscopy desk*  Procedure Time: 12:00 pm     Location:   [facility]        Endoscopy Department, First Floor (Enter through ER Doors) *         [address] [place], [place] [address]      [phone] or [phone] ([facility]) to reschedule        PRE-PROCEDURE CHECKLIST    If you have diabetes, ask your regular doctor for diet and medication restrictions.  If you take any antiplatelet or anticoagulant medications (such as Coumadin, Lovenox, Plavix, etc.) and have not already discussed this, please call your primary physician for advice on holding this medication.  If you take Aspirin, you may continue to do so.  If you are or may be pregnant, please discuss the risks and benefits of this procedure with your doctor.  You must arrange for a ride for the day of your exam. If you fail to arrange transportation with a responsible adult, your procedure will need to be cancelled and rescheduled. Taxi, bus and medical transport are not acceptable unless you have a responsible adult that you know & trust with you. Please arrange for this  to be able to pick you up in our department, approximately one hour after your scheduled procedure, if they are not able to stay with you.      Canceling or rescheduling   If you must cancel or reschedule your appointment, please call [phone] as soon as possible.      Upper Endoscopy or Esophagogastroduodenoscopy (EGD) is a test performed to evaluate symptoms of persistent abdominal pain, nausea, vomiting and difficulty swallowing. It may also be used to treat various conditions of  the upper gastrointestinal tract, such as bleeding, narrowing or abnormal growths.     What happens during an upper endoscopy?  On the day of your procedure, plan to spend up to one and a half hour after your arrival at the endoscopy center. The exam itself takes about 5 to 10 minutes.    Before the exam:  - You will change into a gown.   - Your medical history and medication list will be reviewed with you, unless it has already been done over the phone.   - A nurse will insert an intravenous (IV) line into your hand or arm.  - The doctor will talk to you and give you a consent form to sign.    During the exam:  - Medicine will be given through the IV line to help you relax and feel comfortable.   - Your heart rate and oxygen levels will be monitored. If your blood pressure is low, you may be given fluids through the IV line.   - The doctor will insert a flexible, hollow tube, called an endoscope, into your mouth and will advance it slowly through the esophagus, stomach and duodenum (the first part of your small intestine).   - You may have a feeling of pressure or fullness.   - If you have difficulty swallowing, and the doctor finds a narrowing in your esophagus, it may be possible for the area to be expanded-dilated during the exam.   - If abnormal tissue is found, the doctor may remove it through the endoscope (biopsy it) for closer examination. The tissue removal is painless.    After the exam:  - Any tissue samples removed during the exam will be sent to a lab for evaluation. It may take 5 to 7 working days for you to be notified of the results  - The doctor will prepare a full report for the physician who referred you for the upper endoscopy.   - The doctor will talk with you about the initial results of your exam.   - You may feel bloated after the procedure. That is normal and should not last long.   - Your throat may feel sore for a short time.   - Following the exam, you may resume your normal diet. Avoid  alcohol until the next day.   - You may resume your regular activities the day after the procedure.   - Medication given during the exam will prohibit you from driving for the rest of the day.  - A nurse will provide you with complete discharge instructions before you leave the endoscopy center. Be sure to ask the nurse for specific instructions if you take blood thinners such as Aspirin , Coumadin , Lovenox , Plavix , etc.         PREPARATION    To ensure a successful exam, please follow all instructions carefully.      The night before your exam:    STOP eating solid foods at 11:45 pm.     Clear liquids are okay to drink (examples: Gatorade , apple juice, clear broth, etc.).     DO NOT drink red liquids or alcoholic beverages.    The day of your exam:    STOP drinking clear liquids 4 hours before your exam.     You may take your usual medications with 4 oz. of water, but it needs to be at least 4 hours prior to your procedure.    When you leave for the procedure:    Bring a list of all of your current medications, including any allergy or over-the-counter medications, unless you have already reviewed that with an Endoscopy RN over the phone.     Bring a photo ID as well as up-to-date insurance information, such as your insurance card and any referral forms that might be required by your payer.         DIRECTIONS TO THE ENDOSCOPY DEPARTMENT    From the north ([facility])  Take 35W south, exit on [address]. Get into the left hand daniela, turn left (east), go one-half mile to [address] and turn left. Go north to the first stoplight, take a right on [address] and follow it to the Emergency entrance.    From the south ([facility])  Take 35N to the 35E split and exit on [address]. On [address] , turn left (west) to [address]. Turn right (north) on [address]. Go north to the first stoplight, take a right on [address] and follow it to the  Emergency entrance.    From the east via 35E ([facility])  Take [address]E south to [address]  exit. Turn right on [address]. Go west to [address]. Turn right (north) on [address]. Go to the first stoplight, take a right and follow on [address] to the Emergency entrance.    From the east via [address] ([facility])  Take [address] West to [address]. Turn left (south) on [address] to [address]. Turn left (east) on [address] and follow it to the Emergency entrance.    From the west via [address] ([place])  Take [address] east to [address]. Turn right (south) on [address] to [address]. Turn left (east) on [address] and follow it to the Emergency entrance.

## 2018-02-14 LAB — COPATH REPORT: NORMAL

## 2018-02-27 DIAGNOSIS — N40.1 BENIGN NON-NODULAR PROSTATIC HYPERPLASIA WITH LOWER URINARY TRACT SYMPTOMS: ICD-10-CM

## 2018-03-02 RX ORDER — FINASTERIDE 5 MG/1
TABLET, FILM COATED ORAL
Qty: 90 TABLET | Refills: 2 | Status: SHIPPED | OUTPATIENT
Start: 2018-03-02 | End: 2018-11-29

## 2018-03-02 NOTE — TELEPHONE ENCOUNTER
finasteride      Last Written Prescription Date:  3/24/17  Last Fill Quantity: 90,   # refills: 3  Last Office Visit: 12/19/17  Future Office visit:       Routing refill request to provider for review/approval because:  Drug not on the [facility], [facility] or [facility] refill protocol or controlled substance

## 2018-05-17 DIAGNOSIS — K21.9 GASTROESOPHAGEAL REFLUX DISEASE WITHOUT ESOPHAGITIS: ICD-10-CM

## 2018-05-17 DIAGNOSIS — I10 BENIGN ESSENTIAL HYPERTENSION: ICD-10-CM

## 2018-05-17 DIAGNOSIS — E78.5 HYPERLIPIDEMIA LDL GOAL <130: ICD-10-CM

## 2018-05-19 RX ORDER — OMEPRAZOLE 40 MG/1
CAPSULE, DELAYED RELEASE ORAL
Qty: 90 CAPSULE | Refills: 0 | Status: SHIPPED | OUTPATIENT
Start: 2018-05-19 | End: 2018-08-15

## 2018-05-19 RX ORDER — ATORVASTATIN CALCIUM 40 MG/1
TABLET, FILM COATED ORAL
Qty: 90 TABLET | Refills: 0 | Status: SHIPPED | OUTPATIENT
Start: 2018-05-19 | End: 2018-08-15

## 2018-05-19 RX ORDER — LISINOPRIL AND HYDROCHLOROTHIAZIDE 12.5; 2 MG/1; MG/1
TABLET ORAL
Qty: 90 TABLET | Refills: 0 | Status: SHIPPED | OUTPATIENT
Start: 2018-05-19 | End: 2018-08-15

## 2018-05-19 NOTE — TELEPHONE ENCOUNTER
"Pt due for may appt/labs       Requested Prescriptions   Pending Prescriptions Disp Refills     atorvastatin (LIPITOR) 40 MG tablet [Pharmacy Med Name: Atorvastatin Calcium Oral Tablet 40 MG] 90 tablet 0     Sig: take 1 tablet (40mg) by mouth at bedtime.    Statins Protocol Passed    5/17/2018  7:00 AM       Passed - LDL on file in past 12 months    Recent Labs   Lab Test  05/23/17   1150   LDL  102*            Passed - No abnormal creatine kinase in past 12 months    No lab results found.            Passed - Recent (12 mo) or future (30 days) visit within the authorizing provider's specialty    Patient had office visit in the last 12 months or has a visit in the next 30 days with authorizing provider or within the authorizing provider's specialty.  See \"Patient Info\" tab in inbasket, or \"Choose Columns\" in Meds & Orders section of the refill encounter.           Passed - Patient is age 18 or older        lisinopril-hydrochlorothiazide (PRINZIDE/ZESTORETIC) 20-12.5 MG per tablet [Pharmacy Med Name: Lisinopril-Hydrochlorothiazide Oral Tablet 20-12.5 MG] 90 tablet 0     Sig: take 1 tablet by mouth daily    Diuretics (Including Combos) Protocol Failed    5/17/2018  7:00 AM       Failed - Blood pressure under 140/90 in past 12 months    BP Readings from Last 3 Encounters:   02/13/18 141/83   12/19/17 128/68   12/06/17 136/72                Passed - Recent (12 mo) or future (30 days) visit within the authorizing provider's specialty    Patient had office visit in the last 12 months or has a visit in the next 30 days with authorizing provider or within the authorizing provider's specialty.  See \"Patient Info\" tab in [employer], or \"Choose Columns\" in Meds & Orders section of the refill encounter.           Passed - Patient is age 18 or older       Passed - Normal serum creatinine on file in past 12 months    Recent Labs   Lab Test  05/23/17   1150   CR  1.25             Passed - Normal serum potassium on file in past 12 " "months    Recent Labs   Lab Test  05/23/17   1150   POTASSIUM  3.7                   Passed - Normal serum sodium on file in past 12 months    Recent Labs   Lab Test  05/23/17   1150   NA  140              omeprazole (PRILOSEC) 40 MG capsule [Pharmacy Med Name: Omeprazole Oral Capsule Delayed Release 40 MG] 90 capsule 0     Sig: take 1 capsule (40mg) by mouth daily. take 30 to 60 minutes before a meal.    PPI Protocol Passed    5/17/2018  7:00 AM       Passed - Not on Clopidogrel (unless Pantoprazole ordered)       Passed - No diagnosis of osteoporosis on record       Passed - Recent (12 mo) or future (30 days) visit within the authorizing provider's specialty    Patient had office visit in the last 12 months or has a visit in the next 30 days with authorizing provider or within the authorizing provider's specialty.  See \"Patient Info\" tab in inbasket, or \"Choose Columns\" in Meds & Orders section of the refill encounter.           Passed - Patient is age 18 or older          "

## 2018-05-25 DIAGNOSIS — R35.0 BENIGN PROSTATIC HYPERPLASIA WITH URINARY FREQUENCY: ICD-10-CM

## 2018-05-25 DIAGNOSIS — N40.1 BENIGN PROSTATIC HYPERPLASIA WITH URINARY FREQUENCY: ICD-10-CM

## 2018-05-25 RX ORDER — TAMSULOSIN HYDROCHLORIDE 0.4 MG/1
CAPSULE ORAL
Qty: 90 CAPSULE | Refills: 0 | OUTPATIENT
Start: 2018-05-25

## 2018-05-25 NOTE — TELEPHONE ENCOUNTER
"  Refill to soon-and pt due for may appt   Requested Prescriptions   Pending Prescriptions Disp Refills     tamsulosin (FLOMAX) 0.4 MG capsule [Pharmacy Med Name: Tamsulosin HCl Oral Capsule 0.4 MG] 90 capsule 0     Sig: Take 1 capsule (0.4 mg) by mouth daily    Alpha Blockers Failed    5/25/2018  7:00 AM       Failed - Blood pressure under 140/90 in past 12 months    BP Readings from Last 3 Encounters:   02/13/18 141/83   12/19/17 128/68   12/06/17 136/72                Passed - Recent (12 mo) or future (30 days) visit within the authorizing provider's specialty    Patient had office visit in the last 12 months or has a visit in the next 30 days with authorizing provider or within the authorizing provider's specialty.  See \"Patient Info\" tab in inbasket, or \"Choose Columns\" in Meds & Orders section of the refill encounter.           Passed - Patient does not have Tadalafil, Vardenafil, or Sildenafil on their medication list       Passed - Patient is 18 years of age or older          "

## 2018-06-06 ENCOUNTER — OFFICE VISIT (OUTPATIENT)
Dept: SLEEP MEDICINE | Facility: CLINIC | Age: 73
End: 2018-06-06
Payer: COMMERCIAL

## 2018-06-06 VITALS
WEIGHT: 204 LBS | SYSTOLIC BLOOD PRESSURE: 148 MMHG | BODY MASS INDEX: 34.83 KG/M2 | DIASTOLIC BLOOD PRESSURE: 84 MMHG | RESPIRATION RATE: 14 BRPM | HEIGHT: 64 IN | HEART RATE: 85 BPM | OXYGEN SATURATION: 98 %

## 2018-06-06 DIAGNOSIS — G47.33 OSA (OBSTRUCTIVE SLEEP APNEA): Primary | ICD-10-CM

## 2018-06-06 DIAGNOSIS — E66.9 OBESITY (BMI 30-39.9): ICD-10-CM

## 2018-06-06 PROCEDURE — 99213 OFFICE O/P EST LOW 20 MIN: CPT | Performed by: INTERNAL MEDICINE

## 2018-06-06 NOTE — PATIENT INSTRUCTIONS
MY TREATMENT INFORMATION FOR SLEEP APNEA-  Rodney Hendrickson    MY CONTACT NUMBERS ARE:  [phone]  DOCTOR : Miguel WRIGHT [facility]  SLEEP CENTER :  [place]    Your sleep apnea is controlled with CPAP.   Continue use of CPAP:  - Recommend using CPAP every night for at least 7-8 hours each night  - Daytime naps are not advised, but use CPAP if taking naps.    - Do not remove mask headgear when you go to bathroom at night, but just unplug the hose.    Objective measure goal  Compliance  Goal >70% (preferrably 100%)  Leak   Goal < 10% (less than 24 L/min)  AHI  Goal < 5 events per hour   Usage  Goal 7-8 hours.      Patient was advised not to drive if drowsy or sleepy.      Follow up in 12 months.      Weight Loss:    Weight management is a personal decision.  If you are interested in exploring weight loss strategies, the following discussion covers the impact on weight loss on sleep apnea and the approaches that may be successful.    Weight loss decreases severity of sleep apnea in most people with obesity. For those with mild obesity who have developed snoring with weight gain, even 15-30 pound weight loss can improve and occasionally eliminate sleep apnea.  Structured and life-long dietary and health habits are necessary to lose weight and keep healthier weight levels.     Though there may be significant health benefits from weight loss, long-term weight loss is very difficult to achieve- studies show success with dietary management in less than 10% of people. In addition, substantial weight loss may require years of dietary control and may be difficult if patients have severe obesity. In these cases, surgical management may be considered.  Finally, older individuals who have tolerated obesity without health complications may be less likely to benefit from weight loss strategies.    Your BMI is Body mass index is 35 kg/(m^2).  Body mass index (BMI) is one way to tell whether you are at a healthy weight, overweight, or  obese. It measures your weight in relation to your height.  A BMI of 18.5 to 24.9 is in the healthy range. A person with a BMI of 25 to 29.9 is considered overweight, and someone with a BMI of 30 or greater is considered obese. More than two-thirds of American adults are considered overweight or obese.  Being overweight or obese increases the risk for further weight gain. Excess weight may lead to heart disease and diabetes.  Creating and following plans for healthy eating and physical activity may help you improve your health.  Weight control is part of healthy lifestyle and includes exercise, emotional health, and healthy eating habits. Careful eating habits lifelong are the mainstay of weight control. Though there are significant health benefits from weight loss, long-term weight loss with diet alone may be very difficult to achieve- studies show long-term success with dietary management in less than 10% of people. Attaining a healthy weight may be especially difficult to achieve in those with severe obesity. In some cases, medications, devices and surgical management might be considered.  What can you do?  If you are overweight or obese and are interested in methods for weight loss, you should discuss this with your provider.     Consider reducing daily calorie intake by 500 calories.     Keep a food journal.     Avoiding skipping meals, consider cutting portions instead.    Diet combined with exercise helps maintain muscle while optimizing fat loss. Strength training is particularly important for building and maintaining muscle mass. Exercise helps reduce stress, increase energy, and improves fitness. Increasing exercise without diet control, however, may not burn enough calories to loose weight.       Start walking three days a week 10-20 minutes at a time    Work towards walking thirty minutes five days a week     Eventually, increase the speed of your walking for 1-2 minutes at time    In addition, we  recommend that you review healthy lifestyles and methods for weight loss available through the National Institutes of Health patient information sites:  http://win.niddk.nih.gov/publications/index.htm    And look into health and wellness programs that may be available through your health insurance provider, employer, local community center, or eliana club.    Weight management plan: Patient was referred to their PCP to discuss a diet and exercise plan.    Your blood pressure was checked while you were in clinic today.  Please read the guidelines below about what these numbers mean and what you should do about them.  Your systolic blood pressure is the top number.  This is the pressure when the heart is pumping.  Your diastolic blood pressure is the bottom number.  This is the pressure in between beats.  If your systolic blood pressure is less than 120 and your diastolic blood pressure is less than 80, then your blood pressure is normal. There is nothing more that you need to do about it  If your systolic blood pressure is 120-139 or your diastolic blood pressure is 80-89, your blood pressure may be higher than it should be.  You should have your blood pressure re-checked within a year by a primary care provider.  If your systolic blood pressure is 140 or greater or your diastolic blood pressure is 90 or greater, you may have high blood pressure.  High blood pressure is treatable, but if left untreated over time it can put you at risk for heart attack, stroke, or kidney failure.  You should have your blood pressure re-checked by a primary care provider within the next four weeks.

## 2018-06-06 NOTE — PROGRESS NOTES
[facility] Follow-[facility]  June 6, 2018      Name: Rodney Hendrickson MRN# [id number]   Age: 71 year old YOB: 1945   Date of visit: June 6, 2018  Primary care provider: [facility]         Assessment and Plan:     1. Obstructive Sleep Apnea:  - Full compliance of PAP use.  - Clinical response: good  - Recommend using CPAP every night for at least 7-8 hours each night  - Daytime naps are not advised, but use CPAP if taking naps  - Patient was advised not to drive if drowsy or sleepy.  - Follow up in sleep clinic in 12 months.    2. Obesity: Encouraged patient to lose weight. Counseled regarding weight loss through diet modification and increased physical activity. Patient was given nstuctions of weight loss and advised to follow up her PCP for further weight loss interventions. Weight loss instructions given.    3.  Hypertension:  - Advised adherence to anti-hypertensive medications, if prescribed  - Recommend follow up with PCP       No orders of the defined types were placed in this encounter.      Summary Counseling:  New sleep schedule recommendation: given    Check out http://yoursleep.aasmnet.org/    All questions were answered.  The patient indicates understanding of the above issues and agrees with the plan set forth.           Chief Complaint:    CPAP Follow up            History of Present Illness:     Rodney Hendrickson is a 71 year old male with history of moderate DOMINIK hypertension, hyperlipidemia, GERD and BPH who comes to [facility] for follow up. He was diagnosed sleep apnea and was prescribed PAP and was set up on May 2, 2017. Patient received a Resmed AirSense 10 Auto. Pressures were set at 8-16 cm H2O.     Last visit, patient was 100% complaint of oral CPAP usage over 4 hours with a low AHI and had good benefits.  Today, he uses his CPAP every night with 100% compliance. He feels much better. He is not snoring as per  wife.      PREVIOUS SLEEP STUDIES:  Home sleep study: 4/12/17   AHI 25.5/hr (supine 65.8/hr due to limited time of supine sleep)  INDER 16.8/hr  Snore index 36.1%  Lowest O 2 saturation is 71%  Time spent O 2 saturation below 88% was 16.1 minutes    CPAP Compliance:  Dates: 5/6 /2018- 6/4/2018  100%   Days used: 30/30  Average daily use (days used): 7.7 hrs  Leak 95 percentile: 10.4L/min  Residual AHI: 2.1/hr  Pressure: 8-16 cmH2O  95% percentile pressure: 13.2 cmH2O (max 14.6)    Cotter Sleepiness Scale score today: 8/24   Cotter Sleepiness Scale score last visit: 2/24   Pre-PAP Cotter Sleepiness Scale score: 18/24    PAP machine: [employer]  Interface:  Mask: nasal mask  Chin strap: No  Leak: very rare  Humidity: Yes    Difficulties with therapy:    [-] Difficulty tolerating the pressure  [-] Epistaxis:   [-] Nasal congestion   [x] Dry mouth: alway  [-] Mouth breathing:   [-] Pain/skin breakdown:     Improvements noted with CPAP:   [+] waking up more refreshed  [+] sleeping better with less arousals  [+] nocturia improved   [+] improved energy level during the day    SLEEP-WAKE SCHEDULE: unchanged    Other sleep problems: None     Drowsy driving / near incidents: No    Medications that affect sleep: No            Medications:     Current Outpatient Prescriptions   Medication Sig     aspirin 81 MG tablet Take  by mouth daily.     atorvastatin (LIPITOR) 40 MG tablet take 1 tablet (40mg) by mouth at bedtime.     ferrous sulfate (IRON) 325 (65 FE) MG tablet Take by mouth At Bedtime      finasteride (PROSCAR) 5 MG tablet take 1 tablet (5 mg) by mouth daily     lisinopril-hydrochlorothiazide (PRINZIDE/ZESTORETIC) 20-12.5 MG per tablet take 1 tablet by mouth daily     Omeprazole (PRILOSEC PO) Take 20 mg by mouth 2 times daily (before meals)      omeprazole (PRILOSEC) 40 MG capsule take 1 capsule (40mg) by mouth daily. take 30 to 60 minutes before a meal.     tamsulosin (FLOMAX) 0.4 MG capsule Take 1 capsule (0.4 mg) by  mouth daily     No current facility-administered medications for this visit.      Facility-Administered Medications Ordered in Other Visits   Medication     benzocaine (HURRICAINE/TOPEX) 20 % spray        No Known Allergies         Past Medical History:     Past Medical History:   Diagnosis Date     BPH (benign prostatic hyperplasia)      GERD (gastroesophageal reflux disease)      Hyperlipidemia LDL goal <130      Hypertension              Past Surgical History:      Past Surgical History:   Procedure Laterality Date     ARTHROPLASTY PATELLO-FEMORAL (KNEE)      right knee replacement     COLONOSCOPY       COLONOSCOPY N/A 1/18/2017    Procedure: COLONOSCOPY;  Surgeon: Geoffrey Gallegos MD;  Location:  GI     ESOPHAGOSCOPY, GASTROSCOPY, DUODENOSCOPY (EGD), COMBINED  5/3/2013    Procedure: COMBINED ESOPHAGOSCOPY, GASTROSCOPY, DUODENOSCOPY (EGD), BIOPSY SINGLE OR MULTIPLE;  ESOPHAGOSCOPY, GASTROSCOPY, DUODENOSCOPY (EGD) with biopies;  Surgeon: Geoffrey Gallegos MD;  Location:  GI     ESOPHAGOSCOPY, GASTROSCOPY, DUODENOSCOPY (EGD), COMBINED N/A 1/8/2016    Procedure: COMBINED ESOPHAGOSCOPY, GASTROSCOPY, DUODENOSCOPY (EGD), BIOPSY SINGLE OR MULTIPLE;  Surgeon: Geoffrey Gallegos MD;  Location:  GI     ESOPHAGOSCOPY, GASTROSCOPY, DUODENOSCOPY (EGD), COMBINED N/A 1/18/2017    Procedure: COMBINED ESOPHAGOSCOPY, GASTROSCOPY, DUODENOSCOPY (EGD), BIOPSY SINGLE OR MULTIPLE;  Surgeon: Geoffrey Gallegos MD;  Location:  GI     ESOPHAGOSCOPY, GASTROSCOPY, DUODENOSCOPY (EGD), COMBINED N/A 2/13/2018    Procedure: COMBINED ESOPHAGOSCOPY, GASTROSCOPY, DUODENOSCOPY (EGD), BIOPSY SINGLE OR MULTIPLE;  ESOPHAGOSCOPY, GASTROSCOPY, DUODENOSCOPY (EGD) with biopsies using cold forceps;  Surgeon: Geoffrey Gallegos MD;  Location:  GI     ROTATOR CUFF REPAIR RT/LT      right and left       Social History   Substance Use Topics     Smoking status: Former Smoker     Quit date: 4/30/1983     Smokeless tobacco: Never Used     Alcohol  "use Yes      Comment: occa       Family History   Problem Relation Age of Onset     Hypertension Father      Colon Cancer Father       of Colon CA     Heart Defect Father      Enlarged heart     Hypertension Brother      Hypertension Sister      Hypertension Brother             Physical Examination:   /84  Pulse 85  Resp 14  Ht 1.626 m (5' 4.02\")  Wt 92.5 kg (204 lb)  SpO2 98%  BMI 35 kg/m2            Data: All pertinent previous laboratory data reviewed     No results found for: PH, PHARTERIAL, PO2, [id number], SAT, PCO2, HCO3, BASEEXCESS, RITCHIE, BEB  Lab Results   Component Value Date    TSH 2.43 2017     Lab Results   Component Value Date     (H) 2017     (A) 2016     Lab Results   Component Value Date    HGB 13.7 2017     Lab Results   Component Value Date    BUN 25 2017    CR 1.25 2017    CR 1.4 (A) 2016     No results found for: SHARRI      He will follow up with me in about 12 month(s).         Copy to: [facility]      Miguel Esposito MD 2017     [facility]  [id number] [place] [address]       Fifteen minutes spent with patient, all of which were spent face-to-face counseling, consulting, coordinating plan of care and going over PAP download/sleep study and chart review.          "

## 2018-06-06 NOTE — MR AVS SNAPSHOT
After Visit Summary   6/6/2018    Rodney Hendrickson    MRN: [id number]           Patient Information     Date Of Birth          1945        Visit Information        Provider Department      6/6/2018 9:00 AM Miguel Esposito MD [facility] [place]        Today's Diagnoses     DOMINIK (obstructive sleep apnea)    -  1    Obesity (BMI 30-39.9)          Care Instructions    MY TREATMENT INFORMATION FOR SLEEP APNEA-  Rodney Hendrickson    MY CONTACT NUMBERS ARE:  [phone]  DOCTOR : Miguel Esposito  SLEEP CENTER :  [place]    Your sleep apnea is controlled with CPAP.   Continue use of CPAP:  - Recommend using CPAP every night for at least 7-8 hours each night  - Daytime naps are not advised, but use CPAP if taking naps.    - Do not remove mask headgear when you go to bathroom at night, but just unplug the hose.    Objective measure goal  Compliance  Goal >70% (preferrably 100%)  Leak   Goal < 10% (less than 24 L/min)  AHI  Goal < 5 events per hour   Usage  Goal 7-8 hours.      Patient was advised not to drive if drowsy or sleepy.      Follow up in 12 months.      Weight Loss:    Weight management is a personal decision.  If you are interested in exploring weight loss strategies, the following discussion covers the impact on weight loss on sleep apnea and the approaches that may be successful.    Weight loss decreases severity of sleep apnea in most people with obesity. For those with mild obesity who have developed snoring with weight gain, even 15-30 pound weight loss can improve and occasionally eliminate sleep apnea.  Structured and life-long dietary and health habits are necessary to lose weight and keep healthier weight levels.     Though there may be significant health benefits from weight loss, long-term weight loss is very difficult to achieve- studies show success with dietary management in less than 10% of people. In addition, substantial weight loss may require years of  dietary control and may be difficult if patients have severe obesity. In these cases, surgical management may be considered.  Finally, older individuals who have tolerated obesity without health complications may be less likely to benefit from weight loss strategies.    Your BMI is Body mass index is 35 kg/(m^2).  Body mass index (BMI) is one way to tell whether you are at a healthy weight, overweight, or obese. It measures your weight in relation to your height.  A BMI of 18.5 to 24.9 is in the healthy range. A person with a BMI of 25 to 29.9 is considered overweight, and someone with a BMI of 30 or greater is considered obese. More than two-thirds of American adults are considered overweight or obese.  Being overweight or obese increases the risk for further weight gain. Excess weight may lead to heart disease and diabetes.  Creating and following plans for healthy eating and physical activity may help you improve your health.  Weight control is part of healthy lifestyle and includes exercise, emotional health, and healthy eating habits. Careful eating habits lifelong are the mainstay of weight control. Though there are significant health benefits from weight loss, long-term weight loss with diet alone may be very difficult to achieve- studies show long-term success with dietary management in less than 10% of people. Attaining a healthy weight may be especially difficult to achieve in those with severe obesity. In some cases, medications, devices and surgical management might be considered.  What can you do?  If you are overweight or obese and are interested in methods for weight loss, you should discuss this with your provider.     Consider reducing daily calorie intake by 500 calories.     Keep a food journal.     Avoiding skipping meals, consider cutting portions instead.    Diet combined with exercise helps maintain muscle while optimizing fat loss. Strength training is particularly important for building and  maintaining muscle mass. Exercise helps reduce stress, increase energy, and improves fitness. Increasing exercise without diet control, however, may not burn enough calories to loose weight.       Start walking three days a week 10-20 minutes at a time    Work towards walking thirty minutes five days a week     Eventually, increase the speed of your walking for 1-2 minutes at time    In addition, we recommend that you review healthy lifestyles and methods for weight loss available through the National Institutes of Health patient information sites:  http://win.niddk.nih.gov/publications/index.htm    And look into health and wellness programs that may be available through your health insurance provider, employer, local community center, or eliana club.    Weight management plan: Patient was referred to their PCP to discuss a diet and exercise plan.    Your blood pressure was checked while you were in clinic today.  Please read the guidelines below about what these numbers mean and what you should do about them.  Your systolic blood pressure is the top number.  This is the pressure when the heart is pumping.  Your diastolic blood pressure is the bottom number.  This is the pressure in between beats.  If your systolic blood pressure is less than 120 and your diastolic blood pressure is less than 80, then your blood pressure is normal. There is nothing more that you need to do about it  If your systolic blood pressure is 120-139 or your diastolic blood pressure is 80-89, your blood pressure may be higher than it should be.  You should have your blood pressure re-checked within a year by a primary care provider.  If your systolic blood pressure is 140 or greater or your diastolic blood pressure is 90 or greater, you may have high blood pressure.  High blood pressure is treatable, but if left untreated over time it can put you at risk for heart attack, stroke, or kidney failure.  You should have your blood pressure  "re-checked by a primary care provider within the next four weeks.                Follow-ups after your visit        Who to contact     If you have questions or need follow up information about today's clinic visit or your schedule please contact [facility] directly at [phone].  Normal or non-critical lab and imaging results will be communicated to you by MyChart, letter or phone within 4 business days after the clinic has received the results. If you do not hear from us within 7 days, please contact the clinic through MyChart or phone. If you have a critical or abnormal lab result, we will notify you by phone as soon as possible.  Submit refill requests through [employer] or call your pharmacy and they will forward the refill request to us. Please allow 3 business days for your refill to be completed.          Additional Information About Your Visit        Care EveryWhere ID     This is your Care EveryWhere ID. This could be used by other organizations to access your [place] medical records  [id number]        Your Vitals Were     Pulse Respirations Height Pulse Oximetry BMI (Body Mass Index)       85 14 1.626 m (5' 4.02\") 98% 35 kg/m2        Blood Pressure from Last 3 Encounters:   06/06/18 148/84   02/13/18 141/83   12/19/17 128/68    Weight from Last 3 Encounters:   06/06/18 92.5 kg (204 lb)   12/19/17 94.3 kg (208 lb)   06/05/17 93.4 kg (205 lb 14.6 oz)              We Performed the Following     Comprehensive DME        Primary Care Provider Office Phone # Fax #    Brennan Mercado [phone]       [address] [facility] [address]        Equal Access to Services     [facility]: Hadii yris Wiggins, waaxda luqadaha, qaybta kaalayesha mcguire. So [facility] [phone].    ATENCIÓN: Si habla español, tiene a andrews disposición servicios gratuitos de asistencia lingüística. Johnathan hernandez [phone].    We comply " with applicable federal civil rights laws and [place] laws. We do not discriminate on the basis of race, color, national origin, age, disability, sex, sexual orientation, or gender identity.            Thank you!     Thank you for choosing [facility]  for your care. Our goal is always to provide you with excellent care. Hearing back from our patients is one way we can continue to improve our services. Please take a few minutes to complete the written survey that you may receive in the mail after your visit with us. Thank you!             Your Updated Medication List - Protect others around you: Learn how to safely use, store and throw away your medicines at www.disposemymeds.org.          This list is accurate as of 6/6/18  9:11 AM.  Always use your most recent med list.                   Brand Name Dispense Instructions for use Diagnosis    aspirin 81 MG tablet      Take  by mouth daily.        atorvastatin 40 MG tablet    LIPITOR    90 tablet    take 1 tablet (40mg) by mouth at bedtime.    Hyperlipidemia LDL goal <130       ferrous sulfate 325 (65 Fe) MG tablet    IRON     Take by mouth At Bedtime        finasteride 5 MG tablet    PROSCAR    90 tablet    take 1 tablet (5 mg) by mouth daily    Benign non-nodular prostatic hyperplasia with lower urinary tract symptoms       lisinopril-hydrochlorothiazide 20-12.5 MG per tablet    PRINZIDE/ZESTORETIC    90 tablet    take 1 tablet by mouth daily    Benign essential hypertension       omeprazole 40 MG capsule    priLOSEC    90 capsule    take 1 capsule (40mg) by mouth daily. take 30 to 60 minutes before a meal.    Gastroesophageal reflux disease without esophagitis       PRILOSEC PO      Take 20 mg by mouth 2 times daily (before meals)        tamsulosin 0.4 MG capsule    FLOMAX    90 capsule    Take 1 capsule (0.4 mg) by mouth daily    Benign prostatic hyperplasia with urinary frequency

## 2018-06-06 NOTE — NURSING NOTE
"Chief Complaint   Patient presents with     RECHECK     f/u annual pap visit       Initial /84  Pulse 85  Resp 14  Ht 1.626 m (5' 4.02\")  Wt 92.5 kg (204 lb)  SpO2 98%  BMI 35 kg/m2 Estimated body mass index is 35 kg/(m^2) as calculated from the following:    Height as of this encounter: 1.626 m (5' 4.02\").    Weight as of this encounter: 92.5 kg (204 lb).    Medication Reconciliation: complete        ESS 8    Shavonne Rodrigez, Sleep Clinic Specialist  "

## 2018-06-26 ENCOUNTER — OFFICE VISIT (OUTPATIENT)
Dept: INTERNAL MEDICINE | Facility: CLINIC | Age: 73
End: 2018-06-26
Payer: COMMERCIAL

## 2018-06-26 VITALS
RESPIRATION RATE: 16 BRPM | WEIGHT: 208.9 LBS | DIASTOLIC BLOOD PRESSURE: 82 MMHG | SYSTOLIC BLOOD PRESSURE: 118 MMHG | OXYGEN SATURATION: 99 % | HEIGHT: 64 IN | TEMPERATURE: 98 F | HEART RATE: 61 BPM | BODY MASS INDEX: 35.67 KG/M2

## 2018-06-26 DIAGNOSIS — E78.5 HYPERLIPIDEMIA LDL GOAL <130: ICD-10-CM

## 2018-06-26 DIAGNOSIS — R35.0 BENIGN PROSTATIC HYPERPLASIA WITH URINARY FREQUENCY: ICD-10-CM

## 2018-06-26 DIAGNOSIS — N40.1 BENIGN PROSTATIC HYPERPLASIA WITH URINARY FREQUENCY: ICD-10-CM

## 2018-06-26 DIAGNOSIS — I10 ESSENTIAL HYPERTENSION: Primary | ICD-10-CM

## 2018-06-26 LAB
ALT SERPL W P-5'-P-CCNC: 27 U/L (ref 0–70)
ANION GAP SERPL CALCULATED.3IONS-SCNC: 8 MMOL/L (ref 3–14)
BUN SERPL-MCNC: 27 MG/DL (ref 7–30)
CALCIUM SERPL-MCNC: 9.1 MG/DL (ref 8.5–10.1)
CHLORIDE SERPL-SCNC: 104 MMOL/L (ref 94–109)
CHOLEST SERPL-MCNC: 158 MG/DL
CO2 SERPL-SCNC: 28 MMOL/L (ref 20–32)
CREAT SERPL-MCNC: 1.34 MG/DL (ref 0.66–1.25)
CREAT UR-MCNC: 100 MG/DL
ERYTHROCYTE [DISTWIDTH] IN BLOOD BY AUTOMATED COUNT: 12.4 % (ref 10–15)
GFR SERPL CREATININE-BSD FRML MDRD: 52 ML/MIN/1.7M2
GLUCOSE SERPL-MCNC: 126 MG/DL (ref 70–99)
HCT VFR BLD AUTO: 38.8 % (ref 40–53)
HDLC SERPL-MCNC: 42 MG/DL
HGB BLD-MCNC: 13 G/DL (ref 13.3–17.7)
LDLC SERPL CALC-MCNC: 88 MG/DL
MCH RBC QN AUTO: 31.7 PG (ref 26.5–33)
MCHC RBC AUTO-ENTMCNC: 33.5 G/DL (ref 31.5–36.5)
MCV RBC AUTO: 95 FL (ref 78–100)
MICROALBUMIN UR-MCNC: <5 MG/L
MICROALBUMIN/CREAT UR: NORMAL MG/G CR (ref 0–17)
NONHDLC SERPL-MCNC: 116 MG/DL
PLATELET # BLD AUTO: 178 10E9/L (ref 150–450)
POTASSIUM SERPL-SCNC: 4 MMOL/L (ref 3.4–5.3)
PSA SERPL-ACNC: 1.01 UG/L (ref 0–4)
RBC # BLD AUTO: 4.1 10E12/L (ref 4.4–5.9)
SODIUM SERPL-SCNC: 140 MMOL/L (ref 133–144)
TRIGL SERPL-MCNC: 142 MG/DL
WBC # BLD AUTO: 4.5 10E9/L (ref 4–11)

## 2018-06-26 PROCEDURE — 36415 COLL VENOUS BLD VENIPUNCTURE: CPT | Performed by: NURSE PRACTITIONER

## 2018-06-26 PROCEDURE — 85027 COMPLETE CBC AUTOMATED: CPT | Performed by: NURSE PRACTITIONER

## 2018-06-26 PROCEDURE — 80048 BASIC METABOLIC PNL TOTAL CA: CPT | Performed by: NURSE PRACTITIONER

## 2018-06-26 PROCEDURE — 82043 UR ALBUMIN QUANTITATIVE: CPT | Performed by: NURSE PRACTITIONER

## 2018-06-26 PROCEDURE — G0103 PSA SCREENING: HCPCS | Performed by: NURSE PRACTITIONER

## 2018-06-26 PROCEDURE — 99397 PER PM REEVAL EST PAT 65+ YR: CPT | Performed by: NURSE PRACTITIONER

## 2018-06-26 PROCEDURE — 80061 LIPID PANEL: CPT | Performed by: NURSE PRACTITIONER

## 2018-06-26 PROCEDURE — 84460 ALANINE AMINO (ALT) (SGPT): CPT | Performed by: NURSE PRACTITIONER

## 2018-06-26 RX ORDER — TAMSULOSIN HYDROCHLORIDE 0.4 MG/1
0.4 CAPSULE ORAL DAILY
Qty: 90 CAPSULE | Refills: 1 | Status: SHIPPED | OUTPATIENT
Start: 2018-06-26 | End: 2018-12-21

## 2018-06-26 ASSESSMENT — ACTIVITIES OF DAILY LIVING (ADL)
CURRENT_FUNCTION: NO ASSISTANCE NEEDED
I_NEED_ASSISTANCE_FOR_THE_FOLLOWING_DAILY_ACTIVITIES:: NO ASSISTANCE IS NEEDED

## 2018-06-26 NOTE — PATIENT INSTRUCTIONS
Preventive Health Recommendations:   Male Ages 65 and over    Yearly exam:             See your health care provider every year in order to  o   Review health changes.   o   Discuss preventive care.    o   Review your medicines if your doctor has prescribed any.    Talk with your health care provider about whether you should have a test to screen for prostate cancer (PSA).    Every 3 years, have a diabetes test (fasting glucose). If you are at risk for diabetes, you should have this test more often.    Every 5 years, have a cholesterol test. Have this test more often if you are at risk for high cholesterol or heart disease.     Every 10 years, have a colonoscopy. Or, have a yearly FIT test (stool test). These exams will check for colon cancer.    Talk to with your health care provider about screening for Abdominal Aortic Aneurysm if you have a family history of AAA or have a history of smoking.    Shots:     Get a flu shot each year.     Get a tetanus shot every 10 years.     Talk to your doctor about your pneumonia vaccines. There are now two you should receive - Pneumovax (PPSV 23) and Prevnar (PCV 13).     Talk to your pharmacist about a shingles vaccine.     Talk to your doctor about the hepatitis B vaccine.  Nutrition:     Eat at least 5 servings of fruits and vegetables each day.     Eat whole-grain bread, whole-wheat pasta and brown rice instead of white grains and rice.     Get adequate Calcium and Vitamin D.   Lifestyle    Exercise for at least 150 minutes a week (30 minutes a day, 5 days a week). This will help you control your weight and prevent disease.     Limit alcohol to one drink per day.     No smoking.     Wear sunscreen to prevent skin cancer.     See your dentist every six months for an exam and cleaning.     See your eye doctor every 1 to 2 years to screen for conditions such as glaucoma, macular degeneration, cataracts, etc

## 2018-06-26 NOTE — LETTER
June 27, 2018      Rodney Hendrickson  [address] [id number]        Dear ,    We are writing to inform you of your test results.    Your glucose is elevated at 126 (normal <100). Exercise, avoiding simple carbohydrates in your diet and wt loss will all help to lower this. Please work on these as you can and I recommend rechecking fasting blood sugar in 6 months.  You also have a mild anemia, please take OTC multivitamins with iron.     Resulted Orders   CBC with platelets   Result Value Ref Range    WBC 4.5 4.0 - 11.0 10e9/L    RBC Count 4.10 (L) 4.4 - 5.9 10e12/L    Hemoglobin 13.0 (L) 13.3 - 17.7 g/dL    Hematocrit 38.8 (L) 40.0 - 53.0 %    MCV 95 78 - 100 fl    MCH 31.7 26.5 - 33.0 pg    MCHC 33.5 31.5 - 36.5 g/dL    RDW 12.4 10.0 - 15.0 %    Platelet Count 178 150 - 450 10e9/L      Comment:      Results confirmed by repeat test   Basic metabolic panel   Result Value Ref Range    Sodium 140 133 - 144 mmol/L    Potassium 4.0 3.4 - 5.3 mmol/L    Chloride 104 94 - 109 mmol/L    Carbon Dioxide 28 20 - 32 mmol/L    Anion Gap 8 3 - 14 mmol/L    Glucose 126 (H) 70 - 99 mg/dL      Comment:      Fasting specimen    Urea Nitrogen 27 7 - 30 mg/dL    Creatinine 1.34 (H) 0.66 - 1.25 mg/dL    GFR Estimate 52 (L) >60 mL/min/1.7m2      Comment:      Non  GFR Calc    GFR Estimate If Black 63 >60 mL/min/1.7m2      Comment:       GFR Calc    Calcium 9.1 8.5 - 10.1 mg/dL   ALT   Result Value Ref Range    ALT 27 0 - 70 U/L   Lipid Profile   Result Value Ref Range    Cholesterol 158 <200 mg/dL    Triglycerides 142 <150 mg/dL      Comment:      Fasting specimen    HDL Cholesterol 42 >39 mg/dL    LDL Cholesterol Calculated 88 <100 mg/dL      Comment:      Desirable:       <100 mg/dl    Non HDL Cholesterol 116 <130 mg/dL   Prostate spec antigen screen   Result Value Ref Range    PSA 1.01 0 - 4 ug/L      Comment:      Assay Method:  Chemiluminescence using Siemens Vista analyzer    Albumin Random Urine Quantitative with Creat Ratio   Result Value Ref Range    Creatinine Urine 100 mg/dL    Albumin Urine mg/L <5 mg/L    Albumin Urine mg/g Cr Unable to calculate due to low value 0 - 17 mg/g Cr       If you have any questions or concerns, please call the clinic at the number listed above.       Sincerely,        Beatrice Mendoza NP

## 2018-06-26 NOTE — MR AVS SNAPSHOT
After Visit Summary   6/26/2018    Rodney Hendrickson    MRN: [id number]           Patient Information     Date Of Birth          1945        Visit Information        Provider Department      6/26/2018 7:20 AM Beatrice Mendoza NP [facility]        Today's Diagnoses     Essential hypertension    -  1    Hyperlipidemia LDL goal <130        Benign prostatic hyperplasia with urinary frequency          Care Instructions      Preventive Health Recommendations:   Male Ages 65 and over    Yearly exam:             See your health care provider every year in order to  o   Review health changes.   o   Discuss preventive care.    o   Review your medicines if your doctor has prescribed any.    Talk with your health care provider about whether you should have a test to screen for prostate cancer (PSA).    Every 3 years, have a diabetes test (fasting glucose). If you are at risk for diabetes, you should have this test more often.    Every 5 years, have a cholesterol test. Have this test more often if you are at risk for high cholesterol or heart disease.     Every 10 years, have a colonoscopy. Or, have a yearly FIT test (stool test). These exams will check for colon cancer.    Talk to with your health care provider about screening for Abdominal Aortic Aneurysm if you have a family history of AAA or have a history of smoking.    Shots:     Get a flu shot each year.     Get a tetanus shot every 10 years.     Talk to your doctor about your pneumonia vaccines. There are now two you should receive - Pneumovax (PPSV 23) and Prevnar (PCV 13).     Talk to your pharmacist about a shingles vaccine.     Talk to your doctor about the hepatitis B vaccine.  Nutrition:     Eat at least 5 servings of fruits and vegetables each day.     Eat whole-grain bread, whole-wheat pasta and brown rice instead of white grains and rice.     Get adequate Calcium and Vitamin D.   Lifestyle    Exercise for at least 150 minutes  "a week (30 minutes a day, 5 days a week). This will help you control your weight and prevent disease.     Limit alcohol to one drink per day.     No smoking.     Wear sunscreen to prevent skin cancer.     See your dentist every six months for an exam and cleaning.     See your eye doctor every 1 to 2 years to screen for conditions such as glaucoma, macular degeneration, cataracts, etc           Follow-ups after your visit        Who to contact     If you have questions or need follow up information about today's clinic visit or your schedule please contact [facility] directly at [phone].  Normal or non-critical lab and imaging results will be communicated to you by MyChart, letter or phone within 4 business days after the clinic has received the results. If you do not hear from us within 7 days, please contact the clinic through MyChart or phone. If you have a critical or abnormal lab result, we will notify you by phone as soon as possible.  Submit refill requests through [employer] or call your pharmacy and they will forward the refill request to us. Please allow 3 business days for your refill to be completed.          Additional Information About Your Visit        Care EveryWhere ID     This is your Care EveryWhere ID. This could be used by other organizations to access your [place] medical records  [id number]        Your Vitals Were     Pulse Temperature Respirations Height Pulse Oximetry BMI (Body Mass Index)    61 98  F (36.7  C) (Oral) 16 5' 4\" (1.626 m) 99% 35.86 kg/m2       Blood Pressure from Last 3 Encounters:   06/26/18 118/82   06/06/18 148/84   02/13/18 141/83    Weight from Last 3 Encounters:   06/26/18 208 lb 14.4 oz (94.8 kg)   06/06/18 204 lb (92.5 kg)   12/19/17 208 lb (94.3 kg)              We Performed the Following     Albumin Random Urine Quantitative with Creat Ratio     ALT     Basic metabolic panel     CBC with platelets     Lipid Profile     Prostate spec antigen " screen          Where to get your medicines      These medications were sent to [id number] - [place], [place] - [address] [address]  [address] [address] [address]     Phone:  [phone]     tamsulosin 0.4 MG capsule          Primary Care Provider Office Phone # Fax #    Brennan Mercado [phone]       [address] [facility] [address]        Equal Access to Services     ZEN CARRASCO : Hadii aad ku hadasho Soomaali, waaxda luqadaha, qaybta kaalmada adeegyada, waxay idiin hayaan adeeg kharash laaaron . So [facility] [phone].    ATENCIÓN: Si habla español, tiene a andrews disposición servicios gratuitos de asistencia lingüística. [facility] [phone].    We comply with applicable federal civil rights laws and [place] laws. We do not discriminate on the basis of race, color, national origin, age, disability, sex, sexual orientation, or gender identity.            Thank you!     Thank you for choosing [facility]  for your care. Our goal is always to provide you with excellent care. Hearing back from our patients is one way we can continue to improve our services. Please take a few minutes to complete the written survey that you may receive in the mail after your visit with us. Thank you!             Your Updated Medication List - Protect others around you: Learn how to safely use, store and throw away your medicines at www.disposemymeds.org.          This list is accurate as of 6/26/18  7:59 AM.  Always use your most recent med list.                   Brand Name Dispense Instructions for use Diagnosis    aspirin 81 MG tablet      Take  by mouth daily.        atorvastatin 40 MG tablet    LIPITOR    90 tablet    take 1 tablet (40mg) by mouth at bedtime.    Hyperlipidemia LDL goal <130       ferrous sulfate 325 (65 Fe) MG tablet    IRON     Take by mouth At Bedtime        finasteride 5 MG tablet    PROSCAR    90 tablet    take 1 tablet (5 mg) by mouth daily     Benign non-nodular prostatic hyperplasia with lower urinary tract symptoms       lisinopril-hydrochlorothiazide 20-12.5 MG per tablet    PRINZIDE/ZESTORETIC    90 tablet    take 1 tablet by mouth daily    Benign essential hypertension       omeprazole 40 MG capsule    priLOSEC    90 capsule    take 1 capsule (40mg) by mouth daily. take 30 to 60 minutes before a meal.    Gastroesophageal reflux disease without esophagitis       PRILOSEC PO      Take 20 mg by mouth 2 times daily (before meals)        tamsulosin 0.4 MG capsule    FLOMAX    90 capsule    Take 1 capsule (0.4 mg) by mouth daily    Benign prostatic hyperplasia with urinary frequency

## 2018-06-26 NOTE — PROGRESS NOTES
SUBJECTIVE:   Rodney Hendrickson is a 72 year old male who presents for Preventive Visit.      Are you in the first 12 months of your Medicare coverage?  No    Physical   Annual:     Getting at least 3 servings of Calcium per day::  Yes    Bi-annual eye exam::  Yes    Dental care twice a year::  Yes    Sleep apnea or symptoms of sleep apnea::  Sleep apnea    Diet::  Regular (no restrictions)    Taking medications regularly::  Yes    Medication side effects::  None    Additional concerns today::  No    Ability to successfully perform activities of daily living: no assistance needed  Home Safety:  No safety concerns identified  Hearing Impairment: no hearing concerns        Reviewed and updated as needed this visit by clinical staff  Tobacco  Allergies  Meds  Med Hx  Surg Hx  Fam Hx  Soc Hx        Reviewed and updated as needed this visit by Provider        Social History   Substance Use Topics     Smoking status: Former Smoker     Quit date: 4/30/1983     Smokeless tobacco: Never Used     Alcohol use Yes      Comment: occa       Alcohol Use 6/26/2018   If you drink alcohol do you typically have greater than 3 drinks per day OR greater than 7 drinks per week? Not Applicable               Today's PHQ-2 Score:   PHQ-2 ( 1999 [employer]) 6/26/2018   Q1: Little interest or pleasure in doing things 0   Q2: Feeling down, depressed or hopeless 0   PHQ-2 Score 0   Q1: Little interest or pleasure in doing things Not at all   Q2: Feeling down, depressed or hopeless Not at all   PHQ-2 Score 0       Do you feel safe in your environment - Yes    Do you have a Health Care Directive?: Yes: Patient states has Advance Directive and will bring in a copy to clinic.    Current providers sharing in care for this patient include:   Patient Care Team:  Brennan eMrcado MD as PCP - General (Internal Medicine)    The following health maintenance items are reviewed in Epic and correct as of today:  Health Maintenance   Topic Date Due      HEPATITIS C SCREENING  09/06/1963     ADVANCE DIRECTIVE PLANNING Q5 YRS  09/06/2000     AORTIC ANEURYSM SCREENING (SYSTEM ASSIGNED)  09/06/2010     FALL RISK ASSESSMENT  05/23/2018     PHQ-2 Q1 YR  05/23/2018     INFLUENZA VACCINE (Season Ended) 09/01/2018     LIPID SCREEN Q5 YR MALE (SYSTEM ASSIGNED)  05/23/2022     TETANUS IMMUNIZATION (SYSTEM ASSIGNED)  09/07/2022     COLON CANCER SCREEN (SYSTEM ASSIGNED)  01/18/2027     PNEUMOCOCCAL  Completed     BP Readings from Last 3 Encounters:   06/26/18 118/82   06/06/18 148/84   02/13/18 141/83    Wt Readings from Last 3 Encounters:   06/26/18 208 lb 14.4 oz (94.8 kg)   06/06/18 204 lb (92.5 kg)   12/19/17 208 lb (94.3 kg)                  Patient Active Problem List   Diagnosis     Benign non-nodular prostatic hyperplasia with lower urinary tract symptoms     Hyperlipidemia LDL goal <130     Hypertension     Morbid obesity due to excess calories (H)     DOMINIK (obstructive sleep apnea)     Past Surgical History:   Procedure Laterality Date     ARTHROPLASTY PATELLO-FEMORAL (KNEE)      right knee replacement     COLONOSCOPY       COLONOSCOPY N/A 1/18/2017    Procedure: COLONOSCOPY;  Surgeon: Geoffrey Gallegos MD;  Location:  GI     ESOPHAGOSCOPY, GASTROSCOPY, DUODENOSCOPY (EGD), COMBINED  5/3/2013    Procedure: COMBINED ESOPHAGOSCOPY, GASTROSCOPY, DUODENOSCOPY (EGD), BIOPSY SINGLE OR MULTIPLE;  ESOPHAGOSCOPY, GASTROSCOPY, DUODENOSCOPY (EGD) with biopies;  Surgeon: Geoffrey Gallegos MD;  Location:  GI     ESOPHAGOSCOPY, GASTROSCOPY, DUODENOSCOPY (EGD), COMBINED N/A 1/8/2016    Procedure: COMBINED ESOPHAGOSCOPY, GASTROSCOPY, DUODENOSCOPY (EGD), BIOPSY SINGLE OR MULTIPLE;  Surgeon: Geoffrey Gallegos MD;  Location:  GI     ESOPHAGOSCOPY, GASTROSCOPY, DUODENOSCOPY (EGD), COMBINED N/A 1/18/2017    Procedure: COMBINED ESOPHAGOSCOPY, GASTROSCOPY, DUODENOSCOPY (EGD), BIOPSY SINGLE OR MULTIPLE;  Surgeon: Geoffrey Gallegos MD;  Location:  GI     ESOPHAGOSCOPY, GASTROSCOPY,  DUODENOSCOPY (EGD), COMBINED N/A 2018    Procedure: COMBINED ESOPHAGOSCOPY, GASTROSCOPY, DUODENOSCOPY (EGD), BIOPSY SINGLE OR MULTIPLE;  ESOPHAGOSCOPY, GASTROSCOPY, DUODENOSCOPY (EGD) with biopsies using cold forceps;  Surgeon: Geoffrey Gallegos MD;  Location: [facility]     ROTATOR CUFF REPAIR RT/LT      right and left       Social History   Substance Use Topics     Smoking status: Former Smoker     Quit date: 1983     Smokeless tobacco: Never Used     Alcohol use Yes      Comment: occa     Family History   Problem Relation Age of Onset     Hypertension Father      Colon Cancer Father       of Colon CA     Heart Defect Father      Enlarged heart     Hypertension Brother      Hypertension Sister      Hypertension Brother          Current Outpatient Prescriptions   Medication Sig Dispense Refill     aspirin 81 MG tablet Take  by mouth daily.       atorvastatin (LIPITOR) 40 MG tablet take 1 tablet (40mg) by mouth at bedtime. 90 tablet 0     ferrous sulfate (IRON) 325 (65 FE) MG tablet Take by mouth At Bedtime        finasteride (PROSCAR) 5 MG tablet take 1 tablet (5 mg) by mouth daily 90 tablet 2     lisinopril-hydrochlorothiazide (PRINZIDE/ZESTORETIC) 20-12.5 MG per tablet take 1 tablet by mouth daily 90 tablet 0     Omeprazole (PRILOSEC PO) Take 20 mg by mouth 2 times daily (before meals)        omeprazole (PRILOSEC) 40 MG capsule take 1 capsule (40mg) by mouth daily. take 30 to 60 minutes before a meal. 90 capsule 0     tamsulosin (FLOMAX) 0.4 MG capsule Take 1 capsule (0.4 mg) by mouth daily 90 capsule 1           Review of Systems  CONSTITUTIONAL: NEGATIVE for fever, chills, change in weight  ENT/MOUTH: NEGATIVE for ear, mouth and throat problems  RESP: NEGATIVE for significant cough or SOB  CV: NEGATIVE for chest pain, palpitations or peripheral edema  GI: NEGATIVE for nausea, abdominal pain, heartburn, or change in bowel habits  ROS otherwise negative    OBJECTIVE:   /82 (BP Location: Right arm,  "Patient Position: Sitting, Cuff Size: Adult Large)  Pulse 61  Temp 98  F (36.7  C) (Oral)  Resp 16  Ht 5' 4\" (1.626 m)  Wt 208 lb 14.4 oz (94.8 kg)  SpO2 99%  BMI 35.86 kg/m2 Estimated body mass index is 35.86 kg/(m^2) as calculated from the following:    Height as of this encounter: 5' 4\" (1.626 m).    Weight as of this encounter: 208 lb 14.4 oz (94.8 kg).  Physical Exam  GENERAL: healthy, alert and no distress  NECK: no adenopathy, no asymmetry, masses, or scars and thyroid normal to palpation  RESP: lungs clear to auscultation - no rales, rhonchi or wheezes  CV: regular rate and rhythm, normal S1 S2, no S3 or S4, no murmur, click or rub, no peripheral edema and peripheral pulses strong  ABDOMEN: soft, nontender, no hepatosplenomegaly, no masses and bowel sounds normal  MS: no gross musculoskeletal defects noted, no edema  PSYCH: mentation appears normal, affect normal/bright        ASSESSMENT / PLAN:       ICD-10-CM    1. Essential hypertension I10 CBC with platelets     Basic metabolic panel     Albumin Random Urine Quantitative with Creat Ratio   2. Hyperlipidemia LDL goal <130 E78.5 ALT     Lipid Profile   3. Benign prostatic hyperplasia with urinary frequency N40.1 tamsulosin (FLOMAX) 0.4 MG capsule    R35.0 Prostate spec antigen screen       End of Life Planning:  Patient currently has an advanced directive: Yes.  Practitioner is supportive of decision.    COUNSELING:  Reviewed preventive health counseling, as reflected in patient instructions    BP Readings from Last 1 Encounters:   06/26/18 118/82     Estimated body mass index is 35.86 kg/(m^2) as calculated from the following:    Height as of this encounter: 5' 4\" (1.626 m).    Weight as of this encounter: 208 lb 14.4 oz (94.8 kg).           reports that he quit smoking about 35 years ago. He has never used smokeless tobacco.      Appropriate preventive services were discussed with this patient, including applicable screening as appropriate for " cardiovascular disease, diabetes, osteopenia/osteoporosis, and glaucoma.  As appropriate for age/gender, discussed screening for colorectal cancer, prostate cancer, breast cancer, and cervical cancer. Checklist reviewing preventive services available has been given to the patient.    Reviewed patients plan of care and provided an AVS. The Basic Care Plan (routine screening as documented in Health Maintenance) for Rodney meets the Care Plan requirement. This Care Plan has been established and reviewed with the Patient.    Counseling Resources:  ATP IV Guidelines  Pooled Cohorts Equation Calculator  Breast Cancer Risk Calculator  FRAX Risk Assessment  ICSI Preventive Guidelines  Dietary Guidelines for Americans, 2010  USDA's MyPlate  ASA Prophylaxis  Lung CA Screening    Beatrice Mendoza NP  [facility]  Answers for HPI/ROS submitted by the patient on 6/26/2018   PHQ-2 Score: 0

## 2018-08-15 DIAGNOSIS — I10 BENIGN ESSENTIAL HYPERTENSION: ICD-10-CM

## 2018-08-15 DIAGNOSIS — K21.9 GASTROESOPHAGEAL REFLUX DISEASE WITHOUT ESOPHAGITIS: ICD-10-CM

## 2018-08-15 DIAGNOSIS — E78.5 HYPERLIPIDEMIA LDL GOAL <130: ICD-10-CM

## 2018-08-15 NOTE — TELEPHONE ENCOUNTER
"Requested Prescriptions   Pending Prescriptions Disp Refills     omeprazole (PRILOSEC) 40 MG capsule [Pharmacy Med Name: Omeprazole Oral Capsule Delayed Release 40 MG]  Last Written Prescription Date:  5/19/2018  Last Fill Quantity: 90,  # refills: 0   Last office visit: 6/26/2018 with prescribing provider:     Future Office Visit:   90 capsule 0     Sig: take 1 capsule (40mg) by mouth daily. take 30 to 60 minutes before a meal.    PPI Protocol Passed    8/15/2018  7:00 AM       Passed - Not on Clopidogrel (unless Pantoprazole ordered)       Passed - No diagnosis of osteoporosis on record       Passed - Recent (12 mo) or future (30 days) visit within the authorizing provider's specialty    Patient had office visit in the last 12 months or has a visit in the next 30 days with authorizing provider or within the authorizing provider's specialty.  See \"Patient Info\" tab in inBigTipet, or \"Choose Columns\" in Meds & Orders section of the refill encounter.           Passed - Patient is age 18 or older        atorvastatin (LIPITOR) 40 MG tablet [Pharmacy Med Name: Atorvastatin Calcium Oral Tablet 40 MG]  Last Written Prescription Date:  5/19/2018  Last Fill Quantity: 90,  # refills: 0   Last office visit: 6/26/2018 with prescribing provider:     Future Office Visit:   90 tablet 0     Sig: take 1 tablet (40mg) by mouth at bedtime. need to call doctor's office for appt/labs.    Statins Protocol Passed    8/15/2018  7:00 AM       Passed - LDL on file in past 12 months    Recent Labs   Lab Test  06/26/18   0752   LDL  88            Passed - No abnormal creatine kinase in past 12 months    No lab results found.            Passed - Recent (12 mo) or future (30 days) visit within the authorizing provider's specialty    Patient had office visit in the last 12 months or has a visit in the next 30 days with authorizing provider or within the authorizing provider's specialty.  See \"Patient Info\" tab in inBigTipet, or \"Choose Columns\" in " "Meds & Orders section of the refill encounter.           Passed - Patient is age 18 or older        lisinopril-hydrochlorothiazide (PRINZIDE/ZESTORETIC) 20-12.5 MG per tablet [Pharmacy Med Name: Lisinopril-Hydrochlorothiazide Oral Tablet  Last Written Prescription Date:  5/19/2018  Last Fill Quantity: 90,  # refills: 0   Last office visit: 6/26/2018 with prescribing provider:     Future Office Visit:   20-12.5 MG] 90 tablet 0     Sig: take 1 tablet by mouth daily. due for appt/labs. needs to call doctor's office.    Diuretics (Including Combos) Protocol Failed    8/15/2018  7:00 AM       Failed - Normal serum creatinine on file in past 12 months    Recent Labs   Lab Test  06/26/18   0752   CR  1.34*             Passed - Blood pressure under 140/90 in past 12 months    BP Readings from Last 3 Encounters:   06/26/18 118/82   06/06/18 148/84   02/13/18 141/83                Passed - Recent (12 mo) or future (30 days) visit within the authorizing provider's specialty    Patient had office visit in the last 12 months or has a visit in the next 30 days with authorizing provider or within the authorizing provider's specialty.  See \"Patient Info\" tab in inbasket, or \"Choose Columns\" in Meds & Orders section of the refill encounter.           Passed - Patient is age 18 or older       Passed - Normal serum potassium on file in past 12 months    Recent Labs   Lab Test  06/26/18   0752   POTASSIUM  4.0                   Passed - Normal serum sodium on file in past 12 months    Recent Labs   Lab Test  06/26/18   0752   NA  140              "

## 2018-08-17 RX ORDER — OMEPRAZOLE 40 MG/1
CAPSULE, DELAYED RELEASE ORAL
Qty: 90 CAPSULE | Refills: 2 | Status: SHIPPED | OUTPATIENT
Start: 2018-08-17 | End: 2019-05-12

## 2018-08-17 RX ORDER — ATORVASTATIN CALCIUM 40 MG/1
40 TABLET, FILM COATED ORAL DAILY
Qty: 90 TABLET | Refills: 2 | Status: SHIPPED | OUTPATIENT
Start: 2018-08-17 | End: 2019-05-12

## 2018-08-17 RX ORDER — LISINOPRIL AND HYDROCHLOROTHIAZIDE 12.5; 2 MG/1; MG/1
TABLET ORAL
Qty: 90 TABLET | Refills: 0 | Status: SHIPPED | OUTPATIENT
Start: 2018-08-17 | End: 2018-11-14

## 2018-11-14 DIAGNOSIS — I10 BENIGN ESSENTIAL HYPERTENSION: ICD-10-CM

## 2018-11-14 RX ORDER — LISINOPRIL AND HYDROCHLOROTHIAZIDE 12.5; 2 MG/1; MG/1
TABLET ORAL
Qty: 90 TABLET | Refills: 0 | Status: SHIPPED | OUTPATIENT
Start: 2018-11-14 | End: 2019-02-14

## 2018-11-14 NOTE — TELEPHONE ENCOUNTER
"Routing refill request to provider for review/approval because:  Labs out of range:  Cr    Requested Prescriptions   Pending Prescriptions Disp Refills     lisinopril-hydrochlorothiazide (PRINZIDE/ZESTORETIC) 20-12.5 MG per tablet [Pharmacy Med Name: Lisinopril-Hydrochlorothiazide Oral Tablet 20-12.5 MG] 90 tablet 0    Last Written Prescription Date:  8/17/2018  Last Fill Quantity: 90,  # refills: 0   Last office visit: 6/26/2018 with prescribing provider:  Kelly   Future Office Visit:     Sig: take 1 tablet by mouth daily. due for appt/labs. needs to call doctor's office.    Diuretics (Including Combos) Protocol Failed    11/14/2018  7:00 AM       Failed - Normal serum creatinine on file in past 12 months    Recent Labs   Lab Test  06/26/18   0752   CR  1.34*             Passed - Blood pressure under 140/90 in past 12 months    BP Readings from Last 3 Encounters:   06/26/18 118/82   06/06/18 148/84   02/13/18 141/83                Passed - Recent (12 mo) or future (30 days) visit within the authorizing provider's specialty    Patient had office visit in the last 12 months or has a visit in the next 30 days with authorizing provider or within the authorizing provider's specialty.  See \"Patient Info\" tab in inbasket, or \"Choose Columns\" in Meds & Orders section of the refill encounter.             Passed - Patient is age 18 or older       Passed - Normal serum potassium on file in past 12 months    Recent Labs   Lab Test  06/26/18   0752   POTASSIUM  4.0                   Passed - Normal serum sodium on file in past 12 months    Recent Labs   Lab Test  06/26/18   0752   NA  140                ANTHONY DuarteN, RN  [facility]          "

## 2018-11-29 DIAGNOSIS — N40.1 BENIGN NON-NODULAR PROSTATIC HYPERPLASIA WITH LOWER URINARY TRACT SYMPTOMS: ICD-10-CM

## 2018-11-29 RX ORDER — FINASTERIDE 5 MG/1
TABLET, FILM COATED ORAL
Qty: 90 TABLET | Refills: 1 | Status: SHIPPED | OUTPATIENT
Start: 2018-11-29 | End: 2019-05-12

## 2018-11-29 NOTE — TELEPHONE ENCOUNTER
"Requested Prescriptions   Pending Prescriptions Disp Refills     finasteride (PROSCAR) 5 MG tablet [Pharmacy Med Name: Finasteride Oral Tablet 5 MG] 90 tablet 1    Last Written Prescription Date:  03/02/2018  Last Fill Quantity: 90,  # refills: 2   Last office visit: 6/26/2018 with prescribing provider:     Future Office Visit:   Sig: take 1 tablet (5 mg) by mouth daily    Alpha Blockers Passed    11/29/2018  7:00 AM       Passed - Blood pressure under 140/90 in past 12 months    BP Readings from Last 3 Encounters:   06/26/18 118/82   06/06/18 148/84   02/13/18 141/83                Passed - Recent (12 mo) or future (30 days) visit within the authorizing provider's specialty    Patient had office visit in the last 12 months or has a visit in the next 30 days with authorizing provider or within the authorizing provider's specialty.  See \"Patient Info\" tab in inbasket, or \"Choose Columns\" in Meds & Orders section of the refill encounter.             Passed - Patient does not have Tadalafil, Vardenafil, or Sildenafil on their medication list       Passed - Patient is 18 years of age or older        "

## 2018-12-21 DIAGNOSIS — N40.1 BENIGN PROSTATIC HYPERPLASIA WITH URINARY FREQUENCY: ICD-10-CM

## 2018-12-21 DIAGNOSIS — R35.0 BENIGN PROSTATIC HYPERPLASIA WITH URINARY FREQUENCY: ICD-10-CM

## 2018-12-21 NOTE — TELEPHONE ENCOUNTER
"Requested Prescriptions   Pending Prescriptions Disp Refills     tamsulosin (FLOMAX) 0.4 MG capsule [Pharmacy Med Name: Tamsulosin HCl Oral Capsule 0.4 MG] 90 capsule 0    Last Written Prescription Date:  06/26/2018  Last Fill Quantity: 90,  # refills: 1   Last office visit: 6/26/2018 with prescribing provider:     Future Office Visit:   Sig: Take 1 capsule (0.4 mg) by mouth daily    Alpha Blockers Passed - 12/21/2018  7:00 AM       Passed - Blood pressure under 140/90 in past 12 months    BP Readings from Last 3 Encounters:   06/26/18 118/82   06/06/18 148/84   02/13/18 141/83                Passed - Recent (12 mo) or future (30 days) visit within the authorizing provider's specialty    Patient had office visit in the last 12 months or has a visit in the next 30 days with authorizing provider or within the authorizing provider's specialty.  See \"Patient Info\" tab in inbasket, or \"Choose Columns\" in Meds & Orders section of the refill encounter.             Passed - Patient does not have Tadalafil, Vardenafil, or Sildenafil on their medication list       Passed - Patient is 18 years of age or older        "

## 2018-12-27 RX ORDER — TAMSULOSIN HYDROCHLORIDE 0.4 MG/1
0.4 CAPSULE ORAL DAILY
Qty: 90 CAPSULE | Refills: 1 | Status: SHIPPED | OUTPATIENT
Start: 2018-12-27 | End: 2019-06-10

## 2019-01-25 ENCOUNTER — ALLIED HEALTH/NURSE VISIT (OUTPATIENT)
Dept: INTERNAL MEDICINE | Facility: CLINIC | Age: 74
End: 2019-01-25
Payer: COMMERCIAL

## 2019-01-25 VITALS — SYSTOLIC BLOOD PRESSURE: 150 MMHG | DIASTOLIC BLOOD PRESSURE: 76 MMHG

## 2019-01-25 DIAGNOSIS — I10 ESSENTIAL HYPERTENSION: Primary | ICD-10-CM

## 2019-01-25 PROCEDURE — 99207 ZZC NO CHARGE NURSE ONLY: CPT | Performed by: INTERNAL MEDICINE

## 2019-01-25 NOTE — PROGRESS NOTES
Rodney Hendrickson is enrolled/participating in the retail pharmacy Blood Pressure Goals Achievement Program (BPGAP).  Rodney Hendrickson was evaluated at [facility] on January 25, 2019 at which time his blood pressure was:    BP Readings from Last 3 Encounters:   01/25/19 150/76   06/26/18 118/82   06/06/18 148/84     Reviewed lifestyle modifications for blood pressure control and reduction: including making healthy food choices, managing weight, getting regular exercise, smoking cessation, reducing alcohol consumption, monitoring blood pressure regularly.     Rodney Hendrickson is not experiencing symptoms.  Was seen at Urgent care for cold symptoms and BP was elevated, came to pharmacy for recheck.     Follow-Up: BP is not at goal of < 140/90mmHg (patient 18+ years of age with or without diabetes), Recommended follow-up in 1 month at the pharmacy. Routing to PCP as an FYI.    Recommendation to Provider: Instructed Rodney to have BP check redone once his cold symptoms have resolved and he is feeling better.      Rodney Hendrickson was evaluated for enrollment into the PGEN study today.    PLEASE INITIATE ENROLLMENT DISCUSSION WITH HTN PTS  1) Between 30-80 years old                                                                                                               2) BMI between 19-50                                                                                                        3) BP ?140/90 AND ?170/110 patients aged 30-59         BP ?150/90 AND ?170/110 non-diabetic patients aged 60-80       BP ?140/90 AND ?170/110 diabetic patients aged 60-80  4) Additional requirements for uncontrolled HTN patients:        Pt on only 1 class of medication  5) EXCLUDE patient if confirmation of:                  ? Cardiac disease                  ? Chronic Kidney Disease                  ? Pregnancy/Breastfeeding                  ? Secondary Hypertension/Pre-eclampsia                                 ? Vascular  disease    Patient eligible for enrollment:  No  Patient interested in enrollment:  Unknown    This note completed by: Connie PrettyD    [facility]  Phone:  [phone]  Fax:  [phone]

## 2019-02-14 DIAGNOSIS — I10 BENIGN ESSENTIAL HYPERTENSION: ICD-10-CM

## 2019-02-15 RX ORDER — LISINOPRIL AND HYDROCHLOROTHIAZIDE 12.5; 2 MG/1; MG/1
TABLET ORAL
Qty: 90 TABLET | Refills: 0 | Status: SHIPPED | OUTPATIENT
Start: 2019-02-15 | End: 2019-05-12

## 2019-02-26 ENCOUNTER — ALLIED HEALTH/NURSE VISIT (OUTPATIENT)
Dept: INTERNAL MEDICINE | Facility: CLINIC | Age: 74
End: 2019-02-26
Payer: COMMERCIAL

## 2019-02-26 VITALS — SYSTOLIC BLOOD PRESSURE: 152 MMHG | DIASTOLIC BLOOD PRESSURE: 76 MMHG

## 2019-02-26 DIAGNOSIS — I10 ESSENTIAL HYPERTENSION: Primary | ICD-10-CM

## 2019-02-26 PROCEDURE — 99207 ZZC NO CHARGE NURSE ONLY: CPT | Performed by: INTERNAL MEDICINE

## 2019-02-26 NOTE — PROGRESS NOTES
Rodney Hendrickson is enrolled/participating in the retail pharmacy Blood Pressure Goals Achievement Program (BPGAP).  Rodney Hendrickson was evaluated at [facility] on February 26, 2019 at which time his blood pressure was:    BP Readings from Last 3 Encounters:   02/26/19 152/76   01/25/19 150/76   06/26/18 118/82     Reviewed lifestyle modifications for blood pressure control and reduction: including making healthy food choices, managing weight, getting regular exercise, smoking cessation, reducing alcohol consumption, monitoring blood pressure regularly.     Rodney Hendrickson is not experiencing symptoms.    Follow-Up: BP is not at goal of < 140/90mmHg (patient 18+ years of age with or without diabetes), Recommended follow-up in 1 month at the pharmacy. Routing to PCP as an FYI.    Recommendation to Provider: Has now had two consistent elevated BP checks, is returning to pharmacy in one month for additional check, is scheduling MD appt for 2 months from now, if BP is still elevated at MD appt, should consider BP medication adjustment.     Rodney Hendrickson was evaluated for enrollment into the PGEN study today.    PLEASE INITIATE ENROLLMENT DISCUSSION WITH HTN PTS  1) Between 30-80 years old                                                                                                               2) BMI between 19-50                                                                                                        3) BP ?140/90 AND ?170/110 patients aged 30-59         BP ?150/90 AND ?170/110 non-diabetic patients aged 60-80       BP ?140/90 AND ?170/110 diabetic patients aged 60-80  4) Additional requirements for uncontrolled HTN patients:        Pt on only 1 class of medication  5) EXCLUDE patient if confirmation of:                  ? Cardiac disease                  ? Chronic Kidney Disease                  ? Pregnancy/Breastfeeding                  ? Secondary Hypertension/Pre-eclampsia                                  ? Vascular disease    Patient eligible for enrollment:  No  Patient interested in enrollment:  Unknown    This note completed by: Rosemary Escobar PharmD    [facility]  Phone:  [phone]  Fax:  [phone]

## 2019-04-01 ENCOUNTER — ALLIED HEALTH/NURSE VISIT (OUTPATIENT)
Dept: INTERNAL MEDICINE | Facility: CLINIC | Age: 74
End: 2019-04-01
Payer: COMMERCIAL

## 2019-04-01 ENCOUNTER — OFFICE VISIT (OUTPATIENT)
Dept: INTERNAL MEDICINE | Facility: CLINIC | Age: 74
End: 2019-04-01
Payer: COMMERCIAL

## 2019-04-01 VITALS
HEIGHT: 64 IN | TEMPERATURE: 98.5 F | SYSTOLIC BLOOD PRESSURE: 137 MMHG | BODY MASS INDEX: 35.26 KG/M2 | HEART RATE: 84 BPM | RESPIRATION RATE: 16 BRPM | OXYGEN SATURATION: 100 % | WEIGHT: 206.5 LBS | DIASTOLIC BLOOD PRESSURE: 85 MMHG

## 2019-04-01 VITALS — SYSTOLIC BLOOD PRESSURE: 144 MMHG | DIASTOLIC BLOOD PRESSURE: 76 MMHG

## 2019-04-01 DIAGNOSIS — I10 ESSENTIAL HYPERTENSION: Primary | ICD-10-CM

## 2019-04-01 PROCEDURE — 99213 OFFICE O/P EST LOW 20 MIN: CPT | Performed by: NURSE PRACTITIONER

## 2019-04-01 PROCEDURE — 99207 ZZC NO CHARGE NURSE ONLY: CPT | Performed by: INTERNAL MEDICINE

## 2019-04-01 ASSESSMENT — MIFFLIN-ST. JEOR: SCORE: 1592.68

## 2019-04-01 NOTE — PROGRESS NOTES
Rodney Hendrickson is enrolled/participating in the retail pharmacy Blood Pressure Goals Achievement Program (BPGAP).  Rodney Hendrickson was evaluated at [facility] on April 1, 2019 at which time his blood pressure was:    BP Readings from Last 3 Encounters:   04/01/19 144/76   02/26/19 152/76   01/25/19 150/76     Reviewed lifestyle modifications for blood pressure control and reduction: including making healthy food choices, managing weight, getting regular exercise, smoking cessation, reducing alcohol consumption, monitoring blood pressure regularly.     Rodney Hendrickson is not experiencing symptoms.    Follow-Up: BP is not at goal of < 150/90 mmHg (patient 60+ years of age without diabetes), Recommended follow-up with PCP.  Routing to PCP for further review.    Recommendation to Provider: Rodney has now had 3 consecutive BP checks at the pharmacy that are above goal.  Recommend adjustment of HTN medication regimen.      This note completed by: Rosemary Escobar, Essie    [facility]  Phone:  [phone]  Fax:  [phone]

## 2019-04-01 NOTE — Clinical Note
Rodney has now had 3 consecutive BP checks at the pharmacy that are above goal.  Recommend adjustment of HTN medication regimen.

## 2019-04-01 NOTE — PROGRESS NOTES
SUBJECTIVE:   Rodney Hendrickson is a 73 year old male who presents to clinic today for the following health issues:      Hypertension Follow-up      Outpatient blood pressures are being checked at store.  Results are 150/70-80.    Low Salt Diet:  Adds salt to his food out of habit      Amount of exercise or physical activity: 6-7 days/week for an average of 30-45 minutes, walks 3-4 miles most days    Problems taking medications regularly: No    Medication side effects: none    Diet: regular (no restrictions)            Problem list and histories reviewed & adjusted, as indicated.  Additional history: as documented    Patient Active Problem List   Diagnosis     Benign non-nodular prostatic hyperplasia with lower urinary tract symptoms     Hyperlipidemia LDL goal <130     Hypertension     Morbid obesity due to excess calories (H)     DOMINIK (obstructive sleep apnea)     Past Surgical History:   Procedure Laterality Date     ARTHROPLASTY PATELLO-FEMORAL (KNEE)      right knee replacement     COLONOSCOPY       COLONOSCOPY N/A 1/18/2017    Procedure: COLONOSCOPY;  Surgeon: Geoffrey Gallegos MD;  Location:  GI     ESOPHAGOSCOPY, GASTROSCOPY, DUODENOSCOPY (EGD), COMBINED  5/3/2013    Procedure: COMBINED ESOPHAGOSCOPY, GASTROSCOPY, DUODENOSCOPY (EGD), BIOPSY SINGLE OR MULTIPLE;  ESOPHAGOSCOPY, GASTROSCOPY, DUODENOSCOPY (EGD) with biopies;  Surgeon: Geoffrey Gallegos MD;  Location:  GI     ESOPHAGOSCOPY, GASTROSCOPY, DUODENOSCOPY (EGD), COMBINED N/A 1/8/2016    Procedure: COMBINED ESOPHAGOSCOPY, GASTROSCOPY, DUODENOSCOPY (EGD), BIOPSY SINGLE OR MULTIPLE;  Surgeon: Geoffrey Gallegos MD;  Location:  GI     ESOPHAGOSCOPY, GASTROSCOPY, DUODENOSCOPY (EGD), COMBINED N/A 1/18/2017    Procedure: COMBINED ESOPHAGOSCOPY, GASTROSCOPY, DUODENOSCOPY (EGD), BIOPSY SINGLE OR MULTIPLE;  Surgeon: Geoffrey Gallegos MD;  Location:  GI     ESOPHAGOSCOPY, GASTROSCOPY, DUODENOSCOPY (EGD), COMBINED N/A 2/13/2018    Procedure: COMBINED  ESOPHAGOSCOPY, GASTROSCOPY, DUODENOSCOPY (EGD), BIOPSY SINGLE OR MULTIPLE;  ESOPHAGOSCOPY, GASTROSCOPY, DUODENOSCOPY (EGD) with biopsies using cold forceps;  Surgeon: Geoffrey Gallegos MD;  Location: [facility]     ROTATOR CUFF REPAIR RT/LT      right and left       Social History     Tobacco Use     Smoking status: Former Smoker     Last attempt to quit: 1983     Years since quittin.9     Smokeless tobacco: Never Used   Substance Use Topics     Alcohol use: Yes     Comment: occa     Family History   Problem Relation Age of Onset     Hypertension Father      Colon Cancer Father          of Colon CA     Heart Defect Father         Enlarged heart     Hypertension Brother      Hypertension Sister      Hypertension Brother          Current Outpatient Medications   Medication Sig Dispense Refill     aspirin 81 MG tablet Take  by mouth daily.       atorvastatin (LIPITOR) 40 MG tablet Take 1 tablet (40 mg) by mouth daily 90 tablet 2     ferrous sulfate (IRON) 325 (65 FE) MG tablet Take by mouth At Bedtime        finasteride (PROSCAR) 5 MG tablet take 1 tablet (5 mg) by mouth daily 90 tablet 1     lisinopril-hydrochlorothiazide (PRINZIDE/ZESTORETIC) 20-12.5 MG tablet take 1 tablet by mouth daily. due for appt/labs. needs to call doctor's office. 90 tablet 0     Omeprazole (PRILOSEC PO) Take 20 mg by mouth 2 times daily (before meals)        omeprazole (PRILOSEC) 40 MG capsule take 1 capsule (40mg) by mouth daily. take 30 to 60 minutes before a meal. 90 capsule 2     tamsulosin (FLOMAX) 0.4 MG capsule Take 1 capsule (0.4 mg) by mouth daily 90 capsule 1     BP Readings from Last 3 Encounters:   19 137/85   19 144/76   19 152/76    Wt Readings from Last 3 Encounters:   19 93.7 kg (206 lb 8 oz)   18 94.8 kg (208 lb 14.4 oz)   18 92.5 kg (204 lb)                    Reviewed and updated as needed this visit by clinical staff  Tobacco  Allergies  Meds  Med Hx  Surg Hx  Fam Hx   "Soc Hx      Reviewed and updated as needed this visit by Provider         ROS:  Constitutional, HEENT, cardiovascular, pulmonary, gi and gu systems are negative, except as otherwise noted.    OBJECTIVE:     /85 (BP Location: Left arm, Patient Position: Sitting, Cuff Size: Adult Large)   Pulse 84   Temp 98.5  F (36.9  C) (Oral)   Resp 16   Ht 1.626 m (5' 4\")   Wt 93.7 kg (206 lb 8 oz)   SpO2 100%   BMI 35.45 kg/m    Body mass index is 35.45 kg/m .  GENERAL: alert, no distress and obese        ASSESSMENT/PLAN:               ICD-10-CM    1. Essential hypertension I10        The current medical regimen is effective;  continue present plan and medications.    Strict ANDREE diet, f/u BP in 2-4 weeks.    Beatrice Mendoza NP  [facility]    "

## 2019-05-12 DIAGNOSIS — I10 BENIGN ESSENTIAL HYPERTENSION: ICD-10-CM

## 2019-05-12 DIAGNOSIS — K21.9 GASTROESOPHAGEAL REFLUX DISEASE WITHOUT ESOPHAGITIS: ICD-10-CM

## 2019-05-12 DIAGNOSIS — E78.5 HYPERLIPIDEMIA LDL GOAL <130: ICD-10-CM

## 2019-05-12 DIAGNOSIS — N40.1 BENIGN NON-NODULAR PROSTATIC HYPERPLASIA WITH LOWER URINARY TRACT SYMPTOMS: ICD-10-CM

## 2019-05-14 RX ORDER — LISINOPRIL AND HYDROCHLOROTHIAZIDE 12.5; 2 MG/1; MG/1
TABLET ORAL
Qty: 90 TABLET | Refills: 0 | Status: SHIPPED | OUTPATIENT
Start: 2019-05-14 | End: 2019-06-17

## 2019-05-14 RX ORDER — OMEPRAZOLE 40 MG/1
CAPSULE, DELAYED RELEASE ORAL
Qty: 90 CAPSULE | Refills: 1 | Status: SHIPPED | OUTPATIENT
Start: 2019-05-14 | End: 2019-11-04

## 2019-05-14 RX ORDER — FINASTERIDE 5 MG/1
TABLET, FILM COATED ORAL
Qty: 90 TABLET | Refills: 1 | Status: SHIPPED | OUTPATIENT
Start: 2019-05-14 | End: 2019-11-04

## 2019-05-14 RX ORDER — ATORVASTATIN CALCIUM 40 MG/1
40 TABLET, FILM COATED ORAL DAILY
Qty: 90 TABLET | Refills: 0 | Status: SHIPPED | OUTPATIENT
Start: 2019-05-14 | End: 2019-08-08

## 2019-06-10 DIAGNOSIS — R35.0 BENIGN PROSTATIC HYPERPLASIA WITH URINARY FREQUENCY: ICD-10-CM

## 2019-06-10 DIAGNOSIS — N40.1 BENIGN PROSTATIC HYPERPLASIA WITH URINARY FREQUENCY: ICD-10-CM

## 2019-06-11 RX ORDER — TAMSULOSIN HYDROCHLORIDE 0.4 MG/1
CAPSULE ORAL
Qty: 90 CAPSULE | Refills: 0 | Status: SHIPPED | OUTPATIENT
Start: 2019-06-11 | End: 2019-08-08

## 2019-06-11 NOTE — TELEPHONE ENCOUNTER
Per Beatrice's 4/1/19 office visit note:   Strict ANDREE diet, f/u BP in 2-4 weeks    Contacted patient, informed him he is due for BP follow-up. Patient scheduled appointment.   Next 5 appointments (look out 90 days)    Jun 17, 2019  8:20 AM CDT  SHORT with Beatrice Mendoza NP  [facility] [address] [facility] [id number] [phone]        Prescription approved per [facility] Refill Protocol.

## 2019-06-11 NOTE — TELEPHONE ENCOUNTER
"Requested Prescriptions   Pending Prescriptions Disp Refills     tamsulosin (FLOMAX) 0.4 MG capsule [Pharmacy Med Name: Tamsulosin HCl Oral Capsule 0.4 MG]  Last Written Prescription Date:  12/27/2018  Last Fill Quantity: 90,  # refills: 1   Last office visit: 4/1/2019 with prescribing provider:     Future Office Visit:   90 capsule 0     Sig: TAKE 1 CAPSULE (0.4MG) BY MOUTH DAILY       Alpha Blockers Passed - 6/10/2019  5:25 PM        Passed - Blood pressure under 140/90 in past 12 months     BP Readings from Last 3 Encounters:   04/01/19 137/85   04/01/19 144/76   02/26/19 152/76                 Passed - Recent (12 mo) or future (30 days) visit within the authorizing provider's specialty     Patient had office visit in the last 12 months or has a visit in the next 30 days with authorizing provider or within the authorizing provider's specialty.  See \"Patient Info\" tab in inbasket, or \"Choose Columns\" in Meds & Orders section of the refill encounter.              Passed - Patient does not have Tadalafil, Vardenafil, or Sildenafil on their medication list        Passed - Medication is active on med list        Passed - Patient is 18 years of age or older        "

## 2019-06-17 ENCOUNTER — OFFICE VISIT (OUTPATIENT)
Dept: INTERNAL MEDICINE | Facility: CLINIC | Age: 74
End: 2019-06-17
Payer: COMMERCIAL

## 2019-06-17 VITALS
HEIGHT: 64 IN | OXYGEN SATURATION: 99 % | HEART RATE: 67 BPM | TEMPERATURE: 98 F | DIASTOLIC BLOOD PRESSURE: 80 MMHG | WEIGHT: 209.9 LBS | RESPIRATION RATE: 14 BRPM | SYSTOLIC BLOOD PRESSURE: 144 MMHG | BODY MASS INDEX: 35.83 KG/M2

## 2019-06-17 DIAGNOSIS — I10 BENIGN ESSENTIAL HYPERTENSION: ICD-10-CM

## 2019-06-17 DIAGNOSIS — Z12.5 SPECIAL SCREENING FOR MALIGNANT NEOPLASM OF PROSTATE: ICD-10-CM

## 2019-06-17 DIAGNOSIS — G47.33 OSA (OBSTRUCTIVE SLEEP APNEA): Primary | ICD-10-CM

## 2019-06-17 DIAGNOSIS — Z00.00 HEALTHCARE MAINTENANCE: Primary | ICD-10-CM

## 2019-06-17 LAB
CREAT UR-MCNC: 92 MG/DL
ERYTHROCYTE [DISTWIDTH] IN BLOOD BY AUTOMATED COUNT: 12.6 % (ref 10–15)
HCT VFR BLD AUTO: 42.6 % (ref 40–53)
HGB BLD-MCNC: 14 G/DL (ref 13.3–17.7)
MCH RBC QN AUTO: 31.6 PG (ref 26.5–33)
MCHC RBC AUTO-ENTMCNC: 32.9 G/DL (ref 31.5–36.5)
MCV RBC AUTO: 96 FL (ref 78–100)
MICROALBUMIN UR-MCNC: 9 MG/L
MICROALBUMIN/CREAT UR: 9.3 MG/G CR (ref 0–17)
PLATELET # BLD AUTO: 158 10E9/L (ref 150–450)
RBC # BLD AUTO: 4.43 10E12/L (ref 4.4–5.9)
WBC # BLD AUTO: 4.6 10E9/L (ref 4–11)

## 2019-06-17 PROCEDURE — 84460 ALANINE AMINO (ALT) (SGPT): CPT | Performed by: NURSE PRACTITIONER

## 2019-06-17 PROCEDURE — 84439 ASSAY OF FREE THYROXINE: CPT | Performed by: NURSE PRACTITIONER

## 2019-06-17 PROCEDURE — 80048 BASIC METABOLIC PNL TOTAL CA: CPT | Performed by: NURSE PRACTITIONER

## 2019-06-17 PROCEDURE — 85027 COMPLETE CBC AUTOMATED: CPT | Performed by: NURSE PRACTITIONER

## 2019-06-17 PROCEDURE — 82043 UR ALBUMIN QUANTITATIVE: CPT | Performed by: NURSE PRACTITIONER

## 2019-06-17 PROCEDURE — 84443 ASSAY THYROID STIM HORMONE: CPT | Performed by: NURSE PRACTITIONER

## 2019-06-17 PROCEDURE — 36415 COLL VENOUS BLD VENIPUNCTURE: CPT | Performed by: NURSE PRACTITIONER

## 2019-06-17 PROCEDURE — G0103 PSA SCREENING: HCPCS | Performed by: NURSE PRACTITIONER

## 2019-06-17 PROCEDURE — 80061 LIPID PANEL: CPT | Performed by: NURSE PRACTITIONER

## 2019-06-17 PROCEDURE — 99213 OFFICE O/P EST LOW 20 MIN: CPT | Performed by: NURSE PRACTITIONER

## 2019-06-17 RX ORDER — LISINOPRIL AND HYDROCHLOROTHIAZIDE 12.5; 2 MG/1; MG/1
2 TABLET ORAL DAILY
Qty: 180 TABLET | Refills: 0 | Status: SHIPPED | OUTPATIENT
Start: 2019-06-17 | End: 2019-08-08

## 2019-06-17 ASSESSMENT — MIFFLIN-ST. JEOR: SCORE: 1608.1

## 2019-06-17 NOTE — LETTER
June 18, 2019      Rodney Hendrickson  [address] [id number]        Dear ,    We are writing to inform you of your test results.    Your glucose is elevated at 132 (normal <100)   This does indicate you are at high risk for developing diabetes mellitus. Exercise, avoiding simple carbohydrates in your diet and wt loss will all help to lower   this risk. Please work on these as you can and I recommend rechecking fasting blood sugar in 6 months.   Your kidney function tests are stable from previous ones.   Your thyroid level is slightly elevated, plan to recheck this in 6 months also.     Resulted Orders   CBC with platelets   Result Value Ref Range    WBC 4.6 4.0 - 11.0 10e9/L    RBC Count 4.43 4.4 - 5.9 10e12/L    Hemoglobin 14.0 13.3 - 17.7 g/dL    Hematocrit 42.6 40.0 - 53.0 %    MCV 96 78 - 100 fl    MCH 31.6 26.5 - 33.0 pg    MCHC 32.9 31.5 - 36.5 g/dL    RDW 12.6 10.0 - 15.0 %    Platelet Count 158 150 - 450 10e9/L   Basic metabolic panel   Result Value Ref Range    Sodium 141 133 - 144 mmol/L    Potassium 3.9 3.4 - 5.3 mmol/L    Chloride 104 94 - 109 mmol/L    Carbon Dioxide 29 20 - 32 mmol/L    Anion Gap 8 3 - 14 mmol/L    Glucose 132 (H) 70 - 99 mg/dL      Comment:      Fasting specimen    Urea Nitrogen 25 7 - 30 mg/dL    Creatinine 1.42 (H) 0.66 - 1.25 mg/dL    GFR Estimate 48 (L) >60 mL/min/[1.73_m2]      Comment:      Non  GFR Calc  Starting 12/18/2018, serum creatinine based estimated GFR (eGFR) will be   calculated using the Chronic Kidney Disease Epidemiology Collaboration   (CKD-EPI) equation.      GFR Estimate If Black 56 (L) >60 mL/min/[1.73_m2]      Comment:       GFR Calc  Starting 12/18/2018, serum creatinine based estimated GFR (eGFR) will be   calculated using the Chronic Kidney Disease Epidemiology Collaboration   (CKD-EPI) equation.      Calcium 9.2 8.5 - 10.1 mg/dL   ALT   Result Value Ref Range    ALT 35 0 - 70 U/L   Lipid Profile    Result Value Ref Range    Cholesterol 172 <200 mg/dL    Triglycerides 143 <150 mg/dL      Comment:      Fasting specimen    HDL Cholesterol 51 >39 mg/dL    LDL Cholesterol Calculated 92 <100 mg/dL      Comment:      Desirable:       <100 mg/dl    Non HDL Cholesterol 121 <130 mg/dL   TSH with free T4 reflex   Result Value Ref Range    TSH 4.49 (H) 0.40 - 4.00 mU/L   Prostate spec antigen screen   Result Value Ref Range    PSA 0.52 0 - 4 ug/L      Comment:      Assay Method:  Chemiluminescence using Siemens Vista analyzer   Albumin Random Urine Quantitative with Creat Ratio   Result Value Ref Range    Creatinine Urine 92 mg/dL    Albumin Urine mg/L 9 mg/L    Albumin Urine mg/g Cr 9.30 0 - 17 mg/g Cr   T4 free   Result Value Ref Range    T4 Free 0.95 0.76 - 1.46 ng/dL       If you have any questions or concerns, please call the clinic at the number listed above.       Sincerely,        Beatrice Mendoza NP

## 2019-06-17 NOTE — PROGRESS NOTES
Subjective     Rodney Hendrickson is a 73 year old male who presents to clinic today for the following health issues:       Hyperlipidemia Follow-Up      Are you having any of the following symptoms? (Select all that apply)  No complaints of shortness of breath, chest pain or pressure.  No increased sweating or nausea with activity.  No left-sided neck or arm pain.  No complaints of pain in calves when walking 1-2 blocks.    Are you regularly taking any medication or supplement to lower your cholesterol?   yes    Are you having muscle aches or other side effects that you think could be caused by your cholesterol lowering medication?  No      Hypertension Follow-up      Do you check your blood pressure regularly outside of the clinic? Yes     Are you following a low salt diet? Yes    Are your blood pressures ever more than 140 on the top number (systolic) OR more   than 90 on the bottom number (diastolic), for example 140/90? Yes    Amount of exercise or physical activity: None    Problems taking medications regularly: No    Medication side effects: none    Diet: low salt          Patient Active Problem List   Diagnosis     Benign non-nodular prostatic hyperplasia with lower urinary tract symptoms     Hyperlipidemia LDL goal <130     Hypertension     Morbid obesity due to excess calories (H)     DOMINIK (obstructive sleep apnea)     Past Surgical History:   Procedure Laterality Date     ARTHROPLASTY PATELLO-FEMORAL (KNEE)      right knee replacement     COLONOSCOPY       COLONOSCOPY N/A 1/18/2017    Procedure: COLONOSCOPY;  Surgeon: Geoffrey Gallegos MD;  Location:  GI     ESOPHAGOSCOPY, GASTROSCOPY, DUODENOSCOPY (EGD), COMBINED  5/3/2013    Procedure: COMBINED ESOPHAGOSCOPY, GASTROSCOPY, DUODENOSCOPY (EGD), BIOPSY SINGLE OR MULTIPLE;  ESOPHAGOSCOPY, GASTROSCOPY, DUODENOSCOPY (EGD) with biopies;  Surgeon: Geoffrey Gallegos MD;  Location:  GI     ESOPHAGOSCOPY, GASTROSCOPY, DUODENOSCOPY (EGD), COMBINED N/A 1/8/2016     Procedure: COMBINED ESOPHAGOSCOPY, GASTROSCOPY, DUODENOSCOPY (EGD), BIOPSY SINGLE OR MULTIPLE;  Surgeon: Geoffrey Gallegos MD;  Location: [facility]     ESOPHAGOSCOPY, GASTROSCOPY, DUODENOSCOPY (EGD), COMBINED N/A 2017    Procedure: COMBINED ESOPHAGOSCOPY, GASTROSCOPY, DUODENOSCOPY (EGD), BIOPSY SINGLE OR MULTIPLE;  Surgeon: Geoffrey Gallegos MD;  Location: [facility]     ESOPHAGOSCOPY, GASTROSCOPY, DUODENOSCOPY (EGD), COMBINED N/A 2018    Procedure: COMBINED ESOPHAGOSCOPY, GASTROSCOPY, DUODENOSCOPY (EGD), BIOPSY SINGLE OR MULTIPLE;  ESOPHAGOSCOPY, GASTROSCOPY, DUODENOSCOPY (EGD) with biopsies using cold forceps;  Surgeon: Geoffrey Gallegos MD;  Location: [facility]     ROTATOR CUFF REPAIR RT/LT      right and left       Social History     Tobacco Use     Smoking status: Former Smoker     Last attempt to quit: 1983     Years since quittin.1     Smokeless tobacco: Never Used   Substance Use Topics     Alcohol use: Yes     Comment: occa     Family History   Problem Relation Age of Onset     Hypertension Father      Colon Cancer Father          of Colon CA     Heart Defect Father         Enlarged heart     Hypertension Brother      Hypertension Sister      Hypertension Brother          Current Outpatient Medications   Medication Sig Dispense Refill     aspirin 81 MG tablet Take  by mouth daily.       atorvastatin (LIPITOR) 40 MG tablet Take 1 tablet (40 mg) by mouth daily 90 tablet 0     ferrous sulfate (IRON) 325 (65 FE) MG tablet Take by mouth At Bedtime        finasteride (PROSCAR) 5 MG tablet take 1 tablet (5 mg) by mouth daily 90 tablet 1     lisinopril-hydrochlorothiazide (PRINZIDE/ZESTORETIC) 20-12.5 MG tablet Take 2 tablets by mouth daily 180 tablet 0     omeprazole (PRILOSEC) 40 MG DR capsule take 1 capsule (40mg) by mouth daily. take 30 to 60 minutes before a meal. 90 capsule 1     tamsulosin (FLOMAX) 0.4 MG capsule TAKE 1 CAPSULE (0.4MG) BY MOUTH DAILY 90 capsule 0     BP Readings from Last 3  "Encounters:   06/17/19 144/80   04/01/19 137/85   04/01/19 144/76    Wt Readings from Last 3 Encounters:   06/17/19 95.2 kg (209 lb 14.4 oz)   04/01/19 93.7 kg (206 lb 8 oz)   06/26/18 94.8 kg (208 lb 14.4 oz)                    Reviewed and updated as needed this visit by Provider         Review of Systems   ROS COMP: CONSTITUTIONAL: NEGATIVE for fever, chills, change in weight  ENT/MOUTH: NEGATIVE for ear, mouth and throat problems  RESP: NEGATIVE for significant cough or SOB  CV: NEGATIVE for chest pain, palpitations or peripheral edema  ROS otherwise negative      Objective    /80   Pulse 67   Temp 98  F (36.7  C) (Oral)   Resp 14   Ht 1.626 m (5' 4\")   Wt 95.2 kg (209 lb 14.4 oz)   SpO2 99%   BMI 36.03 kg/m        Physical Exam   GENERAL: healthy, alert and no distress            Assessment & Plan       ICD-10-CM    1. Healthcare maintenance Z00.00 ALT     Lipid Profile     TSH with free T4 reflex   2. Benign essential hypertension I10 lisinopril-hydrochlorothiazide (PRINZIDE/ZESTORETIC) 20-12.5 MG tablet     CBC with platelets     Basic metabolic panel     Albumin Random Urine Quantitative with Creat Ratio   3. Special screening for malignant neoplasm of prostate Z12.5 Prostate spec antigen screen        BMI:   Estimated body mass index is 36.03 kg/m  as calculated from the following:    Height as of this encounter: 1.626 m (5' 4\").    Weight as of this encounter: 95.2 kg (209 lb 14.4 oz).               F/u 2-4 weeks BP check and PE    Beatrice Mendoza, GILBERT  [facility]    "

## 2019-06-18 LAB
ALT SERPL W P-5'-P-CCNC: 35 U/L (ref 0–70)
ANION GAP SERPL CALCULATED.3IONS-SCNC: 8 MMOL/L (ref 3–14)
BUN SERPL-MCNC: 25 MG/DL (ref 7–30)
CALCIUM SERPL-MCNC: 9.2 MG/DL (ref 8.5–10.1)
CHLORIDE SERPL-SCNC: 104 MMOL/L (ref 94–109)
CHOLEST SERPL-MCNC: 172 MG/DL
CO2 SERPL-SCNC: 29 MMOL/L (ref 20–32)
CREAT SERPL-MCNC: 1.42 MG/DL (ref 0.66–1.25)
GFR SERPL CREATININE-BSD FRML MDRD: 48 ML/MIN/{1.73_M2}
GLUCOSE SERPL-MCNC: 132 MG/DL (ref 70–99)
HDLC SERPL-MCNC: 51 MG/DL
LDLC SERPL CALC-MCNC: 92 MG/DL
NONHDLC SERPL-MCNC: 121 MG/DL
POTASSIUM SERPL-SCNC: 3.9 MMOL/L (ref 3.4–5.3)
PSA SERPL-ACNC: 0.52 UG/L (ref 0–4)
SODIUM SERPL-SCNC: 141 MMOL/L (ref 133–144)
T4 FREE SERPL-MCNC: 0.95 NG/DL (ref 0.76–1.46)
TRIGL SERPL-MCNC: 143 MG/DL
TSH SERPL DL<=0.005 MIU/L-ACNC: 4.49 MU/L (ref 0.4–4)

## 2019-06-27 ENCOUNTER — OFFICE VISIT (OUTPATIENT)
Dept: INTERNAL MEDICINE | Facility: CLINIC | Age: 74
End: 2019-06-27
Payer: COMMERCIAL

## 2019-06-27 VITALS
BODY MASS INDEX: 35.34 KG/M2 | DIASTOLIC BLOOD PRESSURE: 70 MMHG | RESPIRATION RATE: 16 BRPM | TEMPERATURE: 98.2 F | HEIGHT: 64 IN | OXYGEN SATURATION: 100 % | WEIGHT: 207 LBS | HEART RATE: 75 BPM | SYSTOLIC BLOOD PRESSURE: 132 MMHG

## 2019-06-27 DIAGNOSIS — I10 ESSENTIAL HYPERTENSION: ICD-10-CM

## 2019-06-27 DIAGNOSIS — E66.01 MORBID OBESITY DUE TO EXCESS CALORIES (H): ICD-10-CM

## 2019-06-27 DIAGNOSIS — Z00.00 HEALTHCARE MAINTENANCE: Primary | ICD-10-CM

## 2019-06-27 DIAGNOSIS — E78.5 HYPERLIPIDEMIA LDL GOAL <130: ICD-10-CM

## 2019-06-27 DIAGNOSIS — N40.1 BENIGN NON-NODULAR PROSTATIC HYPERPLASIA WITH LOWER URINARY TRACT SYMPTOMS: ICD-10-CM

## 2019-06-27 PROCEDURE — 99207 C PAF COMPLETED  NO CHARGE: CPT | Mod: 25 | Performed by: NURSE PRACTITIONER

## 2019-06-27 PROCEDURE — 99397 PER PM REEVAL EST PAT 65+ YR: CPT | Performed by: NURSE PRACTITIONER

## 2019-06-27 ASSESSMENT — ACTIVITIES OF DAILY LIVING (ADL): CURRENT_FUNCTION: NO ASSISTANCE NEEDED

## 2019-06-27 ASSESSMENT — MIFFLIN-ST. JEOR: SCORE: 1594.95

## 2019-06-27 NOTE — PROGRESS NOTES
"SUBJECTIVE:   Rodney Hendrickson is a 73 year old male who presents for Preventive Visit.      Are you in the first 12 months of your Medicare coverage?  No    Healthy Habits:     In general, how would you rate your overall health?  Very good    Frequency of exercise:  6-7 days/week    Duration of exercise:  45-60 minutes    Do you usually eat at least 4 servings of fruit and vegetables a day, include whole grains    & fiber and avoid regularly eating high fat or \"junk\" foods?  No    Taking medications regularly:  Yes    Barriers to taking medications:  None    Medication side effects:  None    Ability to successfully perform activities of daily living:  No assistance needed    Home Safety:  No safety concerns identified    Hearing Impairment:  No hearing concerns    In the past 6 months, have you been bothered by leaking of urine?  No    In general, how would you rate your overall mental or emotional health?  Good      PHQ-2 Total Score: 0    Additional concerns today:  No    Do you feel safe in your environment? Yes    Do you have a Health Care Directive? No: Advance care planning reviewed with patient; information given to patient to review.      Fall risk  Fallen 2 or more times in the past year?: No  Any fall with injury in the past year?: No    Cognitive Screening   1) Repeat 3 items (Leader, Season, Table)    2) Clock draw: NORMAL  3) 3 item recall: Recalls 3 objects  Results: 3 items recalled: COGNITIVE IMPAIRMENT LESS LIKELY    Mini-CogTM Copyright [employer]. Licensed by the author for use in [facility]; reprinted with permission (amando@.[facility]). All rights reserved.      Do you have sleep apnea, excessive snoring or daytime drowsiness?: yes    Reviewed and updated as needed this visit by clinical staff  Tobacco  Allergies  Meds  Med Hx  Surg Hx  Fam Hx  Soc Hx        Reviewed and updated as needed this visit by Provider        Social History     Tobacco Use     Smoking status: Former Smoker    "  Last attempt to quit: 1983     Years since quittin.1     Smokeless tobacco: Never Used   Substance Use Topics     Alcohol use: Yes     Comment: occa         Alcohol Use 2018   Prescreen: >3 drinks/day or >7 drinks/week? Not Applicable   Prescreen: >3 drinks/day or >7 drinks/week? -               Current providers sharing in care for this patient include:   Patient Care Team:  Brennan Mercado MD as PCP - General (Internal Medicine)  Beatrice Mendoza NP as Assigned PCP    The following health maintenance items are reviewed in Epic and correct as of today:  Health Maintenance   Topic Date Due     HEPATITIS C SCREENING  1945     ZOSTER IMMUNIZATION (1 of 2) 1995     AORTIC ANEURYSM SCREENING (SYSTEM ASSIGNED)  2010     FALL RISK ASSESSMENT  2019     MEDICARE ANNUAL WELLNESS VISIT  2019     DTAP/TDAP/TD IMMUNIZATION (4 - Td) 2022     ADVANCE CARE PLANNING  2023     LIPID  2024     COLONOSCOPY  2027     PHQ-2  Completed     INFLUENZA VACCINE  Completed     PNEUMOCOCCAL IMMUNIZATION 65+ LOW/MEDIUM RISK  Completed     IPV IMMUNIZATION  Aged Out     MENINGITIS IMMUNIZATION  Aged Out     BP Readings from Last 3 Encounters:   19 132/70   19 144/80   19 137/85    Wt Readings from Last 3 Encounters:   19 93.9 kg (207 lb)   19 95.2 kg (209 lb 14.4 oz)   19 93.7 kg (206 lb 8 oz)                  Patient Active Problem List   Diagnosis     Benign non-nodular prostatic hyperplasia with lower urinary tract symptoms     Hyperlipidemia LDL goal <130     Hypertension     Morbid obesity due to excess calories (H)     DOMINIK (obstructive sleep apnea)     Past Surgical History:   Procedure Laterality Date     ARTHROPLASTY PATELLO-FEMORAL (KNEE)      right knee replacement     COLONOSCOPY       COLONOSCOPY N/A 2017    Procedure: COLONOSCOPY;  Surgeon: Geoffrey Gallegos MD;  Location:  GI     ESOPHAGOSCOPY, GASTROSCOPY,  DUODENOSCOPY (EGD), COMBINED  5/3/2013    Procedure: COMBINED ESOPHAGOSCOPY, GASTROSCOPY, DUODENOSCOPY (EGD), BIOPSY SINGLE OR MULTIPLE;  ESOPHAGOSCOPY, GASTROSCOPY, DUODENOSCOPY (EGD) with biopies;  Surgeon: Geoffrey Gallegos MD;  Location: [facility]     ESOPHAGOSCOPY, GASTROSCOPY, DUODENOSCOPY (EGD), COMBINED N/A 2016    Procedure: COMBINED ESOPHAGOSCOPY, GASTROSCOPY, DUODENOSCOPY (EGD), BIOPSY SINGLE OR MULTIPLE;  Surgeon: Geoffrey Gallegos MD;  Location: [facility]     ESOPHAGOSCOPY, GASTROSCOPY, DUODENOSCOPY (EGD), COMBINED N/A 2017    Procedure: COMBINED ESOPHAGOSCOPY, GASTROSCOPY, DUODENOSCOPY (EGD), BIOPSY SINGLE OR MULTIPLE;  Surgeon: Geoffrey Gallegos MD;  Location: [facility]     ESOPHAGOSCOPY, GASTROSCOPY, DUODENOSCOPY (EGD), COMBINED N/A 2018    Procedure: COMBINED ESOPHAGOSCOPY, GASTROSCOPY, DUODENOSCOPY (EGD), BIOPSY SINGLE OR MULTIPLE;  ESOPHAGOSCOPY, GASTROSCOPY, DUODENOSCOPY (EGD) with biopsies using cold forceps;  Surgeon: Geoffrey Gallegos MD;  Location: [facility]     ROTATOR CUFF REPAIR RT/LT      right and left       Social History     Tobacco Use     Smoking status: Former Smoker     Last attempt to quit: 1983     Years since quittin.1     Smokeless tobacco: Never Used   Substance Use Topics     Alcohol use: Yes     Comment: occa     Family History   Problem Relation Age of Onset     Hypertension Father      Colon Cancer Father          of Colon CA     Heart Defect Father         Enlarged heart     Hypertension Brother      Hypertension Sister      Hypertension Brother          Current Outpatient Medications   Medication Sig Dispense Refill     aspirin 81 MG tablet Take  by mouth daily.       atorvastatin (LIPITOR) 40 MG tablet Take 1 tablet (40 mg) by mouth daily 90 tablet 0     ferrous sulfate (IRON) 325 (65 FE) MG tablet Take by mouth At Bedtime        finasteride (PROSCAR) 5 MG tablet take 1 tablet (5 mg) by mouth daily 90 tablet 1     lisinopril-hydrochlorothiazide  "(PRINZIDE/ZESTORETIC) 20-12.5 MG tablet Take 2 tablets by mouth daily 180 tablet 0     omeprazole (PRILOSEC) 40 MG DR capsule take 1 capsule (40mg) by mouth daily. take 30 to 60 minutes before a meal. 90 capsule 1     tamsulosin (FLOMAX) 0.4 MG capsule TAKE 1 CAPSULE (0.4MG) BY MOUTH DAILY 90 capsule 0         Review of Systems  Constitutional, HEENT, cardiovascular, pulmonary, gi and gu systems are negative, except as otherwise noted.    OBJECTIVE:   There were no vitals taken for this visit. Estimated body mass index is 36.03 kg/m  as calculated from the following:    Height as of 6/17/19: 1.626 m (5' 4\").    Weight as of 6/17/19: 95.2 kg (209 lb 14.4 oz).  Physical Exam  GENERAL: healthy, alert and no distress  NECK: no adenopathy, no asymmetry, masses, or scars and thyroid normal to palpation  RESP: lungs clear to auscultation - no rales, rhonchi or wheezes  CV: regular rate and rhythm, normal S1 S2, no S3 or S4, no murmur, click or rub, no peripheral edema and peripheral pulses strong  ABDOMEN: soft, nontender, no hepatosplenomegaly, no masses and bowel sounds normal  MS: no gross musculoskeletal defects noted, no edema  NEURO: Normal strength and tone, mentation intact and speech normal  PSYCH: mentation appears normal, affect normal/bright        ASSESSMENT / PLAN:       End of Life Planning:  Patient currently has an advanced directive: Yes.  Practitioner is supportive of decision.    COUNSELING:  Reviewed preventive health counseling, as reflected in patient instructions    Estimated body mass index is 36.03 kg/m  as calculated from the following:    Height as of 6/17/19: 1.626 m (5' 4\").    Weight as of 6/17/19: 95.2 kg (209 lb 14.4 oz).    Weight management plan: Discussed healthy diet and exercise guidelines     reports that he quit smoking about 36 years ago. He has never used smokeless tobacco.      Appropriate preventive services were discussed with this patient, including applicable screening as " appropriate for cardiovascular disease, diabetes, osteopenia/osteoporosis, and glaucoma.  As appropriate for age/gender, discussed screening for colorectal cancer, prostate cancer, breast cancer, and cervical cancer. Checklist reviewing preventive services available has been given to the patient.    Reviewed patients plan of care and provided an AVS. The Basic Care Plan (routine screening as documented in Health Maintenance) for Rodney meets the Care Plan requirement. This Care Plan has been established and reviewed with the Patient.    Counseling Resources:  ATP IV Guidelines  Pooled Cohorts Equation Calculator  Breast Cancer Risk Calculator  FRAX Risk Assessment  ICSI Preventive Guidelines  Dietary Guidelines for Americans, 2010  USDA's MyPlate  ASA Prophylaxis  Lung CA Screening    Beatrice Mendoza NP  [facility]    Identified Health Risks:

## 2019-08-08 DIAGNOSIS — N40.1 BENIGN PROSTATIC HYPERPLASIA WITH URINARY FREQUENCY: ICD-10-CM

## 2019-08-08 DIAGNOSIS — E78.5 HYPERLIPIDEMIA LDL GOAL <130: ICD-10-CM

## 2019-08-08 DIAGNOSIS — I10 BENIGN ESSENTIAL HYPERTENSION: ICD-10-CM

## 2019-08-08 DIAGNOSIS — R35.0 BENIGN PROSTATIC HYPERPLASIA WITH URINARY FREQUENCY: ICD-10-CM

## 2019-08-08 NOTE — TELEPHONE ENCOUNTER
"Requested Prescriptions   Pending Prescriptions Disp Refills     atorvastatin (LIPITOR) 40 MG tablet [Pharmacy Med Name: Atorvastatin Calcium  Last Written Prescription Date:  5/14/2019  Last Fill Quantity: 90,  # refills: 0   Last office visit: 6/27/2019 with prescribing provider:     Future Office Visit:   Oral Tablet 40 MG] 90 tablet 0     Sig: Take 1 tablet (40 mg) by mouth daily       Statins Protocol Passed - 8/8/2019  5:20 PM        Passed - LDL on file in past 12 months     Recent Labs   Lab Test 06/17/19  0857   LDL 92             Passed - No abnormal creatine kinase in past 12 months     No lab results found.             Passed - Recent (12 mo) or future (30 days) visit within the authorizing provider's specialty     Patient had office visit in the last 12 months or has a visit in the next 30 days with authorizing provider or within the authorizing provider's specialty.  See \"Patient Info\" tab in inbasket, or \"Choose Columns\" in Meds & Orders section of the refill encounter.              Passed - Medication is active on med list        Passed - Patient is age 18 or older        "

## 2019-08-08 NOTE — TELEPHONE ENCOUNTER
"Requested Prescriptions   Pending Prescriptions Disp Refills     lisinopril-hydrochlorothiazide (PRINZIDE/ZESTORETIC) 20-12.5 MG tablet [Pharmacy Med Name: Lisinopril-hydroCHLOROthiazide Oral Tablet 20-12.5 MG]  Last Written Prescription Date:  6/17/2019  Last Fill Quantity: 180,  # refills: 0   Last office visit: 6/27/2019 with prescribing provider:     Future Office Visit:   120 tablet 0     Sig: Take 2 tablets by mouth daily       Diuretics (Including Combos) Protocol Failed - 8/8/2019  5:20 PM        Failed - Normal serum creatinine on file in past 12 months     Recent Labs   Lab Test 06/17/19  0857   CR 1.42*              Passed - Blood pressure under 140/90 in past 12 months     BP Readings from Last 3 Encounters:   06/27/19 132/70   06/17/19 144/80   04/01/19 137/85                 Passed - Recent (12 mo) or future (30 days) visit within the authorizing provider's specialty     Patient had office visit in the last 12 months or has a visit in the next 30 days with authorizing provider or within the authorizing provider's specialty.  See \"Patient Info\" tab in inbasket, or \"Choose Columns\" in Meds & Orders section of the refill encounter.              Passed - Medication is active on med list        Passed - Patient is age 18 or older        Passed - Normal serum potassium on file in past 12 months     Recent Labs   Lab Test 06/17/19  0857   POTASSIUM 3.9                    Passed - Normal serum sodium on file in past 12 months     Recent Labs   Lab Test 06/17/19  0857                 tamsulosin (FLOMAX) 0.4 MG capsule [Pharmacy Med Name: Tamsulosin HCl Oral  Last Written Prescription Date:  6/11/2019  Last Fill Quantity: 90,  # refills: 0   Last office visit: 6/27/2019 with prescribing provider:     Future Office Visit:   Capsule 0.4 MG] 60 capsule 0     Sig: TAKE 1 CAPSULE (0.4MG) BY MOUTH DAILY       Alpha Blockers Passed - 8/8/2019  5:20 PM        Passed - Blood pressure under 140/90 in past 12 months " "    BP Readings from Last 3 Encounters:   06/27/19 132/70   06/17/19 144/80   04/01/19 137/85                 Passed - Recent (12 mo) or future (30 days) visit within the authorizing provider's specialty     Patient had office visit in the last 12 months or has a visit in the next 30 days with authorizing provider or within the authorizing provider's specialty.  See \"Patient Info\" tab in inbasket, or \"Choose Columns\" in Meds & Orders section of the refill encounter.              Passed - Patient does not have Tadalafil, Vardenafil, or Sildenafil on their medication list        Passed - Medication is active on med list        Passed - Patient is 18 years of age or older        "

## 2019-08-09 ENCOUNTER — TELEPHONE (OUTPATIENT)
Dept: INTERNAL MEDICINE | Facility: CLINIC | Age: 74
End: 2019-08-09

## 2019-08-09 RX ORDER — ATORVASTATIN CALCIUM 40 MG/1
40 TABLET, FILM COATED ORAL DAILY
Qty: 90 TABLET | Refills: 3 | Status: SHIPPED | OUTPATIENT
Start: 2019-08-09 | End: 2020-07-28

## 2019-08-09 RX ORDER — LISINOPRIL AND HYDROCHLOROTHIAZIDE 12.5; 2 MG/1; MG/1
2 TABLET ORAL DAILY
Qty: 60 TABLET | Refills: 3 | Status: SHIPPED | OUTPATIENT
Start: 2019-08-09 | End: 2019-08-09

## 2019-08-09 RX ORDER — LISINOPRIL AND HYDROCHLOROTHIAZIDE 12.5; 2 MG/1; MG/1
2 TABLET ORAL DAILY
Qty: 180 TABLET | Refills: 1 | Status: SHIPPED | OUTPATIENT
Start: 2019-08-09 | End: 2020-01-31

## 2019-08-09 RX ORDER — TAMSULOSIN HYDROCHLORIDE 0.4 MG/1
CAPSULE ORAL
Qty: 90 CAPSULE | Refills: 1 | Status: SHIPPED | OUTPATIENT
Start: 2019-08-09 | End: 2020-01-31

## 2019-08-09 RX ORDER — TAMSULOSIN HYDROCHLORIDE 0.4 MG/1
CAPSULE ORAL
Qty: 60 CAPSULE | Refills: 3 | Status: SHIPPED | OUTPATIENT
Start: 2019-08-09 | End: 2019-08-09

## 2019-08-09 NOTE — TELEPHONE ENCOUNTER
[facility] calling. Patient wants a 90 day supply of medication given today. Lisinopril-hydrochlorothiazide and Tamsulosin.    Please call [facility] at [phone] or resend Rx's

## 2019-08-09 NOTE — TELEPHONE ENCOUNTER
Per Beatrice's 6/17/19 BMP result note:   Your kidney function tests are stable from previous ones.    Prescription approved per [facility] Refill Protocol.

## 2019-10-14 ENCOUNTER — ALLIED HEALTH/NURSE VISIT (OUTPATIENT)
Dept: INTERNAL MEDICINE | Facility: CLINIC | Age: 74
End: 2019-10-14
Payer: COMMERCIAL

## 2019-10-14 VITALS — DIASTOLIC BLOOD PRESSURE: 66 MMHG | SYSTOLIC BLOOD PRESSURE: 128 MMHG

## 2019-10-14 DIAGNOSIS — Z01.30 BP CHECK: Primary | ICD-10-CM

## 2019-10-14 PROCEDURE — 99207 ZZC NO CHARGE NURSE ONLY: CPT | Performed by: INTERNAL MEDICINE

## 2019-10-14 NOTE — PROGRESS NOTES
Rodney GUILLEN Emeka was evaluated at [facility] on October 14, 2019 at which time his blood pressure was:    BP Readings from Last 3 Encounters:   10/14/19 128/66   06/27/19 132/70   06/17/19 144/80     Pulse Readings from Last 3 Encounters:   06/27/19 75   06/17/19 67   04/01/19 84       Reviewed lifestyle modifications for blood pressure control and reduction: including making healthy food choices, managing weight, getting regular exercise, smoking cessation, reducing alcohol consumption, monitoring blood pressure regularly.     Symptoms: None    BP Goal:< 140/90 mmHg    BP Assessment:  BP at goal    Potential Reasons for BP too high: NA - Not applicable    BP Follow-Up Plan: Recheck BP in 6 months at pharmacy    Recommendation to Provider:     Note completed by:   Benny Hamilton, PharmD  [facility]

## 2019-11-04 DIAGNOSIS — K21.9 GASTROESOPHAGEAL REFLUX DISEASE WITHOUT ESOPHAGITIS: ICD-10-CM

## 2019-11-04 DIAGNOSIS — N40.1 BENIGN NON-NODULAR PROSTATIC HYPERPLASIA WITH LOWER URINARY TRACT SYMPTOMS: ICD-10-CM

## 2019-11-04 NOTE — TELEPHONE ENCOUNTER
"Requested Prescriptions   Pending Prescriptions Disp Refills     finasteride (PROSCAR) 5 MG tablet [Pharmacy Med Name: Finasteride Oral Tablet 5 MG] 90 tablet 0     Sig: take 1 tablet (5 mg) by mouth daily   Last Written Prescription Date:  05/14/2019  Last Fill Quantity: 90,  # refills: 01   Last office visit: 6/27/2019 with prescribing provider:     Future Office Visit:      Alpha Blockers Passed - 11/4/2019  8:45 AM        Passed - Blood pressure under 140/90 in past 12 months     BP Readings from Last 3 Encounters:   10/14/19 128/66   06/27/19 132/70   06/17/19 144/80                 Passed - Recent (12 mo) or future (30 days) visit within the authorizing provider's specialty     Patient has had an office visit with the authorizing provider or a provider within the authorizing providers department within the previous 12 mos or has a future within next 30 days. See \"Patient Info\" tab in inbasket, or \"Choose Columns\" in Meds & Orders section of the refill encounter.              Passed - Patient does not have Tadalafil, Vardenafil, or Sildenafil on their medication list        Passed - Medication is active on med list        Passed - Patient is 18 years of age or older        omeprazole (PRILOSEC) 40 MG DR capsule [Pharmacy Med Name: Omeprazole Oral Capsule Delayed Release 40 MG] 90 capsule 0     Sig: take 1 capsule (40mg) by mouth daily. take 30 to 60 minutes before a meal.   Last Written Prescription Date:  05/14/2019  Last Fill Quantity: 90,  # refills: 01   Last office visit: 6/27/2019 with prescribing provider:     Future Office Visit:      PPI Protocol Passed - 11/4/2019  8:45 AM        Passed - Not on Clopidogrel (unless Pantoprazole ordered)        Passed - No diagnosis of osteoporosis on record        Passed - Recent (12 mo) or future (30 days) visit within the authorizing provider's specialty     Patient has had an office visit with the authorizing provider or a provider within the authorizing providers " "department within the previous 12 mos or has a future within next 30 days. See \"Patient Info\" tab in inbasket, or \"Choose Columns\" in Meds & Orders section of the refill encounter.              Passed - Medication is active on med list        Passed - Patient is age 18 or older        "

## 2019-11-05 RX ORDER — FINASTERIDE 5 MG/1
TABLET, FILM COATED ORAL
Qty: 90 TABLET | Refills: 1 | Status: SHIPPED | OUTPATIENT
Start: 2019-11-05 | End: 2020-04-23

## 2019-11-05 RX ORDER — OMEPRAZOLE 40 MG/1
CAPSULE, DELAYED RELEASE ORAL
Qty: 90 CAPSULE | Refills: 1 | Status: SHIPPED | OUTPATIENT
Start: 2019-11-05 | End: 2020-04-23

## 2019-11-05 NOTE — TELEPHONE ENCOUNTER
Prescription approved per [facility] [place].

## 2019-12-09 DIAGNOSIS — I10 BENIGN ESSENTIAL HYPERTENSION: Primary | ICD-10-CM

## 2019-12-09 DIAGNOSIS — I10 BENIGN ESSENTIAL HYPERTENSION: ICD-10-CM

## 2019-12-09 LAB
BASOPHILS # BLD AUTO: 0 10E9/L (ref 0–0.2)
BASOPHILS NFR BLD AUTO: 0.5 %
DIFFERENTIAL METHOD BLD: ABNORMAL
EOSINOPHIL # BLD AUTO: 0.3 10E9/L (ref 0–0.7)
EOSINOPHIL NFR BLD AUTO: 5.9 %
ERYTHROCYTE [DISTWIDTH] IN BLOOD BY AUTOMATED COUNT: 13 % (ref 10–15)
HCT VFR BLD AUTO: 40.6 % (ref 40–53)
HGB BLD-MCNC: 13.2 G/DL (ref 13.3–17.7)
LYMPHOCYTES # BLD AUTO: 1.4 10E9/L (ref 0.8–5.3)
LYMPHOCYTES NFR BLD AUTO: 24 %
MCH RBC QN AUTO: 31.1 PG (ref 26.5–33)
MCHC RBC AUTO-ENTMCNC: 32.5 G/DL (ref 31.5–36.5)
MCV RBC AUTO: 96 FL (ref 78–100)
MONOCYTES # BLD AUTO: 0.5 10E9/L (ref 0–1.3)
MONOCYTES NFR BLD AUTO: 7.9 %
NEUTROPHILS # BLD AUTO: 3.6 10E9/L (ref 1.6–8.3)
NEUTROPHILS NFR BLD AUTO: 61.7 %
PLATELET # BLD AUTO: 183 10E9/L (ref 150–450)
RBC # BLD AUTO: 4.24 10E12/L (ref 4.4–5.9)
WBC # BLD AUTO: 5.8 10E9/L (ref 4–11)

## 2019-12-09 PROCEDURE — 80048 BASIC METABOLIC PNL TOTAL CA: CPT | Performed by: INTERNAL MEDICINE

## 2019-12-09 PROCEDURE — 84443 ASSAY THYROID STIM HORMONE: CPT | Performed by: INTERNAL MEDICINE

## 2019-12-09 PROCEDURE — 36415 COLL VENOUS BLD VENIPUNCTURE: CPT | Performed by: INTERNAL MEDICINE

## 2019-12-09 PROCEDURE — 85025 COMPLETE CBC W/AUTO DIFF WBC: CPT | Performed by: INTERNAL MEDICINE

## 2019-12-10 LAB
ANION GAP SERPL CALCULATED.3IONS-SCNC: 9 MMOL/L (ref 3–14)
BUN SERPL-MCNC: 34 MG/DL (ref 7–30)
CALCIUM SERPL-MCNC: 9.2 MG/DL (ref 8.5–10.1)
CHLORIDE SERPL-SCNC: 104 MMOL/L (ref 94–109)
CO2 SERPL-SCNC: 26 MMOL/L (ref 20–32)
CREAT SERPL-MCNC: 1.51 MG/DL (ref 0.66–1.25)
GFR SERPL CREATININE-BSD FRML MDRD: 45 ML/MIN/{1.73_M2}
GLUCOSE SERPL-MCNC: 127 MG/DL (ref 70–99)
POTASSIUM SERPL-SCNC: 4.1 MMOL/L (ref 3.4–5.3)
SODIUM SERPL-SCNC: 139 MMOL/L (ref 133–144)
TSH SERPL DL<=0.005 MIU/L-ACNC: 3.29 MU/L (ref 0.4–4)

## 2019-12-19 ENCOUNTER — OFFICE VISIT (OUTPATIENT)
Dept: INTERNAL MEDICINE | Facility: CLINIC | Age: 74
End: 2019-12-19
Payer: COMMERCIAL

## 2019-12-19 VITALS
HEIGHT: 64 IN | WEIGHT: 211.3 LBS | TEMPERATURE: 98 F | HEART RATE: 74 BPM | BODY MASS INDEX: 36.07 KG/M2 | RESPIRATION RATE: 16 BRPM | SYSTOLIC BLOOD PRESSURE: 136 MMHG | DIASTOLIC BLOOD PRESSURE: 68 MMHG | OXYGEN SATURATION: 97 %

## 2019-12-19 DIAGNOSIS — N18.30 CKD (CHRONIC KIDNEY DISEASE) STAGE 3, GFR 30-59 ML/MIN (H): ICD-10-CM

## 2019-12-19 DIAGNOSIS — E78.5 HYPERLIPIDEMIA LDL GOAL <130: ICD-10-CM

## 2019-12-19 DIAGNOSIS — I10 ESSENTIAL HYPERTENSION: Primary | ICD-10-CM

## 2019-12-19 PROCEDURE — 99214 OFFICE O/P EST MOD 30 MIN: CPT | Performed by: NURSE PRACTITIONER

## 2019-12-19 ASSESSMENT — MIFFLIN-ST. JEOR: SCORE: 1609.45

## 2019-12-19 NOTE — NURSING NOTE
"Patient here for follow up HTN and lipids.  BP (!) 156/80 (BP Location: Right arm, Patient Position: Sitting, Cuff Size: Adult Large)   Pulse 74   Temp 98  F (36.7  C) (Oral)   Resp 16   Ht 1.626 m (5' 4\")   Wt 95.8 kg (211 lb 4.8 oz)   SpO2 97%   BMI 36.27 kg/m      "

## 2019-12-19 NOTE — PROGRESS NOTES
Subjective     Rodney Hendrickson is a 74 year old male who presents to clinic today for the following health issues:    HPI   Hyperlipidemia Follow-Up      Are you regularly taking any medication or supplement to lower your cholesterol?   Yes- lipitor    Are you having muscle aches or other side effects that you think could be caused by your cholesterol lowering medication?  No    Hypertension Follow-up      Do you check your blood pressure regularly outside of the clinic? No     Are you following a low salt diet? Yes    Are your blood pressures ever more than 140 on the top number (systolic) OR more   than 90 on the bottom number (diastolic), for example 140/90? Yes      How many servings of fruits and vegetables do you eat daily?  2-3    On average, how many sweetened beverages do you drink each day (Examples: soda, juice, sweet tea, etc.  Do NOT count diet or artificially sweetened beverages)?   0    How many days per week do you miss taking your medication? 0        Patient Active Problem List   Diagnosis     Benign non-nodular prostatic hyperplasia with lower urinary tract symptoms     Hyperlipidemia LDL goal <130     Hypertension     Morbid obesity due to excess calories (H)     DOMINIK (obstructive sleep apnea)     Past Surgical History:   Procedure Laterality Date     ARTHROPLASTY PATELLO-FEMORAL (KNEE)      right knee replacement     COLONOSCOPY       COLONOSCOPY N/A 1/18/2017    Procedure: COLONOSCOPY;  Surgeon: Geoffrey Gallegos MD;  Location:  GI     ESOPHAGOSCOPY, GASTROSCOPY, DUODENOSCOPY (EGD), COMBINED  5/3/2013    Procedure: COMBINED ESOPHAGOSCOPY, GASTROSCOPY, DUODENOSCOPY (EGD), BIOPSY SINGLE OR MULTIPLE;  ESOPHAGOSCOPY, GASTROSCOPY, DUODENOSCOPY (EGD) with biopies;  Surgeon: Geoffrey Gallegos MD;  Location:  GI     ESOPHAGOSCOPY, GASTROSCOPY, DUODENOSCOPY (EGD), COMBINED N/A 1/8/2016    Procedure: COMBINED ESOPHAGOSCOPY, GASTROSCOPY, DUODENOSCOPY (EGD), BIOPSY SINGLE OR MULTIPLE;  Surgeon:  Geoffrey Gallegos MD;  Location: [facility]     ESOPHAGOSCOPY, GASTROSCOPY, DUODENOSCOPY (EGD), COMBINED N/A 2017    Procedure: COMBINED ESOPHAGOSCOPY, GASTROSCOPY, DUODENOSCOPY (EGD), BIOPSY SINGLE OR MULTIPLE;  Surgeon: Geoffrey Gallegos MD;  Location: [facility] GI     ESOPHAGOSCOPY, GASTROSCOPY, DUODENOSCOPY (EGD), COMBINED N/A 2018    Procedure: COMBINED ESOPHAGOSCOPY, GASTROSCOPY, DUODENOSCOPY (EGD), BIOPSY SINGLE OR MULTIPLE;  ESOPHAGOSCOPY, GASTROSCOPY, DUODENOSCOPY (EGD) with biopsies using cold forceps;  Surgeon: Geoffrey Galleogs MD;  Location: [facility] GI     ROTATOR CUFF REPAIR RT/LT      right and left       Social History     Tobacco Use     Smoking status: Former Smoker     Last attempt to quit: 1983     Years since quittin.6     Smokeless tobacco: Never Used   Substance Use Topics     Alcohol use: Yes     Comment: occa     Family History   Problem Relation Age of Onset     Hypertension Father      Colon Cancer Father          of Colon CA     Heart Defect Father         Enlarged heart     Hypertension Brother      Hypertension Sister      Hypertension Brother          Current Outpatient Medications   Medication Sig Dispense Refill     aspirin 81 MG tablet Take  by mouth daily.       atorvastatin (LIPITOR) 40 MG tablet Take 1 tablet (40 mg) by mouth daily 90 tablet 3     ferrous sulfate (IRON) 325 (65 FE) MG tablet Take by mouth At Bedtime        finasteride (PROSCAR) 5 MG tablet take 1 tablet (5 mg) by mouth daily 90 tablet 1     lisinopril-hydrochlorothiazide (PRINZIDE/ZESTORETIC) 20-12.5 MG tablet Take 2 tablets by mouth daily 180 tablet 1     omeprazole (PRILOSEC) 40 MG DR capsule take 1 capsule (40mg) by mouth daily. take 30 to 60 minutes before a meal. 90 capsule 1     tamsulosin (FLOMAX) 0.4 MG capsule TAKE 1 CAPSULE (0.4MG) BY MOUTH DAILY 90 capsule 1     BP Readings from Last 3 Encounters:   19 (!) 156/80   10/14/19 128/66   19 132/70    Wt Readings from Last 3 Encounters:  "  12/19/19 95.8 kg (211 lb 4.8 oz)   06/27/19 93.9 kg (207 lb)   06/17/19 95.2 kg (209 lb 14.4 oz)                      Reviewed and updated as needed this visit by Provider         Review of Systems   ROS COMP: Constitutional, HEENT, cardiovascular, pulmonary, gi and gu systems are negative, except as otherwise noted.      Objective    BP (!) 156/80 (BP Location: Right arm, Patient Position: Sitting, Cuff Size: Adult Large)   Pulse 74   Temp 98  F (36.7  C) (Oral)   Resp 16   Ht 1.626 m (5' 4\")   Wt 95.8 kg (211 lb 4.8 oz)   SpO2 97%   BMI 36.27 kg/m    Body mass index is 36.27 kg/m .     BP recheck 136/68  Physical Exam   GENERAL: healthy, alert and no distress  NECK: no adenopathy, no asymmetry, masses, or scars and thyroid normal to palpation  RESP: lungs clear to auscultation - no rales, rhonchi or wheezes  CV: regular rate and rhythm, normal S1 S2, no S3 or S4, no murmur, click or rub, no peripheral edema and peripheral pulses strong            Assessment & Plan     (I10) Essential hypertension  (primary encounter diagnosis)  Comment:   Plan:     (N18.3) CKD (chronic kidney disease) stage 3, GFR 30-59 ml/min (H)  Comment:   Plan:     (E78.5) Hyperlipidemia LDL goal <130  Comment:   Plan:     The current medical regimen is effective;  continue present plan and medications.  F/u 6 months         BMI:   Estimated body mass index is 36.27 kg/m  as calculated from the following:    Height as of this encounter: 1.626 m (5' 4\").    Weight as of this encounter: 95.8 kg (211 lb 4.8 oz).               No follow-ups on file.    Beatrice Mendoza NP  [facility]        "

## 2020-01-31 DIAGNOSIS — R35.0 BENIGN PROSTATIC HYPERPLASIA WITH URINARY FREQUENCY: ICD-10-CM

## 2020-01-31 DIAGNOSIS — N40.1 BENIGN PROSTATIC HYPERPLASIA WITH URINARY FREQUENCY: ICD-10-CM

## 2020-01-31 DIAGNOSIS — I10 BENIGN ESSENTIAL HYPERTENSION: ICD-10-CM

## 2020-01-31 RX ORDER — TAMSULOSIN HYDROCHLORIDE 0.4 MG/1
CAPSULE ORAL
Qty: 90 CAPSULE | Refills: 1 | Status: SHIPPED | OUTPATIENT
Start: 2020-01-31 | End: 2020-07-28

## 2020-01-31 RX ORDER — LISINOPRIL AND HYDROCHLOROTHIAZIDE 12.5; 2 MG/1; MG/1
2 TABLET ORAL DAILY
Qty: 180 TABLET | Refills: 1 | Status: SHIPPED | OUTPATIENT
Start: 2020-01-31 | End: 2020-07-28

## 2020-01-31 NOTE — TELEPHONE ENCOUNTER
"Requested Prescriptions   Pending Prescriptions Disp Refills     lisinopril-hydrochlorothiazide (PRINZIDE/ZESTORETIC) 20-12.5 MG tablet [Pharmacy Med Name: Lisinopril-hydroCHLOROthiazide Oral Tablet 20-12.5 MG] 180 tablet 0     Sig: Take 2 tablets by mouth daily   Last Written Prescription Date:  08/09/2019  Last Fill Quantity: 180,  # refills: 01   Last office visit: 12/19/2019 with prescribing provider:     Future Office Visit:      Diuretics (Including Combos) Protocol Failed - 1/31/2020  7:00 AM        Failed - Normal serum creatinine on file in past 12 months     Recent Labs   Lab Test 12/09/19  1014   CR 1.51*              Passed - Blood pressure under 140/90 in past 12 months     BP Readings from Last 3 Encounters:   12/19/19 136/68   10/14/19 128/66   06/27/19 132/70                 Passed - Recent (12 mo) or future (30 days) visit within the authorizing provider's specialty     Patient has had an office visit with the authorizing provider or a provider within the authorizing providers department within the previous 12 mos or has a future within next 30 days. See \"Patient Info\" tab in inbasket, or \"Choose Columns\" in Meds & Orders section of the refill encounter.              Passed - Medication is active on med list        Passed - Patient is age 18 or older        Passed - Normal serum potassium on file in past 12 months     Recent Labs   Lab Test 12/09/19  1014   POTASSIUM 4.1                    Passed - Normal serum sodium on file in past 12 months     Recent Labs   Lab Test 12/09/19  1014                 tamsulosin (FLOMAX) 0.4 MG capsule [Pharmacy Med Name: Tamsulosin HCl Oral Capsule 0.4 MG] 90 capsule 0     Sig: TAKE 1 CAPSULE (0.4MG) BY MOUTH DAILY   Last Written Prescription Date:  08/09/2019  Last Fill Quantity: 90,  # refills: 01   Last office visit: 12/19/2019 with prescribing provider:     Future Office Visit:      Alpha Blockers Passed - 1/31/2020  7:00 AM        Passed - Blood pressure " "under 140/90 in past 12 months     BP Readings from Last 3 Encounters:   12/19/19 136/68   10/14/19 128/66   06/27/19 132/70                 Passed - Recent (12 mo) or future (30 days) visit within the authorizing provider's specialty     Patient has had an office visit with the authorizing provider or a provider within the authorizing providers department within the previous 12 mos or has a future within next 30 days. See \"Patient Info\" tab in inbasket, or \"Choose Columns\" in Meds & Orders section of the refill encounter.              Passed - Patient does not have Tadalafil, Vardenafil, or Sildenafil on their medication list        Passed - Medication is active on med list        Passed - Patient is 18 years of age or older        "

## 2020-01-31 NOTE — TELEPHONE ENCOUNTER
Provider approval needed.     Routing refill request to provider for review/approval because:  Labs out of range:  Creatinine     Creatinine   Date Value Ref Range Status   12/09/2019 1.51 (H) 0.66 - 1.25 mg/dL Final

## 2020-06-11 VITALS — WEIGHT: 205 LBS | HEIGHT: 64 IN | BODY MASS INDEX: 35 KG/M2

## 2020-06-11 ASSESSMENT — MIFFLIN-ST. JEOR: SCORE: 1580.87

## 2020-06-11 NOTE — PROGRESS NOTES
"Rodney Hendrickson is a 74 year old male who is being evaluated via a billable telephone visit.      The patient has been notified of following:     \"This telephone visit will be conducted via a call between you and your physician/provider. We have found that certain health care needs can be provided without the need for a physical exam.  This service lets us provide the care you need with a short phone conversation.  If a prescription is necessary we can send it directly to your pharmacy.  If lab work is needed we can place an order for that and you can then stop by our lab to have the test done at a later time.    Telephone visits are billed at different rates depending on your insurance coverage. During this emergency period, for some insurers they may be billed the same as an in-person visit.  Please reach out to your insurance provider with any questions.    If during the course of the call the physician/provider feels a telephone visit is not appropriate, you will not be charged for this service.\"    Patient has given verbal consent for Telephone visit?  Yes    What phone number would you like to be contacted at? [phone]    How would you like to obtain your AVS? [facility]         CPAP Follow-Up Visit:    Date on this visit: 6/12/2020    Rodney Hendrickson has a follow-up visit today to review his CPAP use for DOMINIK. He was initially seen at the [facility] for snoring, sleepiness and difficulty maintaining sleep. His medical history is significant for hypertension, hyperlipidemia, GERD and BPH.     Previous Home Study Results:  4/12/17 (wt 197 lbs)  AHI 25.5/hr (supine 65.8/hr due to limited time of supine sleep)  Lowest O 2 saturation is 71%. Time spent O2 saturation below 88% was 16.1 minutes    He says some days are good and some not so good. He primarily has difficulty with prolonged awakenings on a couple days per week. Overall, things are better than before CPAP.   Bedtime is 10 PM. Wake " time is 5-6 AM. He falls asleep right away. He wakes 2-3 times and sometimes has trouble falling asleep after the 3rd awakening. He may nap 1-2 times per week on purpose. He dozes inadvertently 4-5 times per day. He rarely drinks alcohol.     PAP machine: ResMed. Pressure settings: 8-16 cm      The compliance data shows that the patient used the CPAP for 30/30 nights, 100% of nights for >4 hours.  The 95th% pressure is 12.1 cm.  The 95th% leak is 25 lpm in the last 14 days.  The average nightly usage is 468 min.  The average AHI is 2.4/hr.       Interface:  Mask: Eson 2 nasal mask. Every once in a while he gets a cushion that does not last as long. He gets new cushions every 2 weeks.   Chin strap: No  Leak: Yes, once in a while when he moves around. The pillow sometimes causes leak. He has been sleeping mostly on his back due to that.   Using Humidifier: Yes, temp is comfortable  Condensation in hose or mask: Yes/No     Difficulties with therapy:    [-] Snoring with CPAP:  [-] Difficulty tolerating the pressure:  [-] Epistaxis/dry nose:   [-] Nasal congestion:  [-] Dry mouth: was getting that a lot, but now he uses a cough drop in his mouth when he goes to sleep.   [-] Mouth breathing: not observed  [-] Pain/skin breakdown:      Improvements noted with CPAP: Gets more sleep with CPAP.  [+] waking up more refreshed  [+] sleeping better with less arousals  [+] nocturia improved   [+] improved energy level during the day    Weight change since sleep study: He estimates weight at 205, up 5-10 pounds since his study.    Past medical/surgical history, family history, social history, medications and allergies were reviewed.      Problem List:  Patient Active Problem List    Diagnosis Date Noted     CKD (chronic kidney disease) stage 3, GFR 30-59 ml/min (H) 12/19/2019     Priority: Medium     DOMINIK (obstructive sleep apnea) 05/23/2017     Priority: Medium     Benign non-nodular prostatic hyperplasia with lower urinary tract  symptoms 03/15/2017     Priority: Medium     Morbid obesity due to excess calories (H) 03/15/2017     Priority: Medium     Hyperlipidemia LDL goal <130      Priority: Medium     Hypertension      Priority: Medium        Impression/Plan:    (G47.33) DOMINIK (obstructive sleep apnea)  (primary encounter diagnosis)  Comment: doing well with CPAP overall. Mildly high leak. Sometimes has prolonged awakenings. Dozes inadvertently often in the evening.  Plan: Continue auto CPAP 8-16 cm. A prescription was written for new supplies. He was advised to talk with [facility] to look at alternative masks. He was encouraged to work on being more active in the daytime to avoid inadvertent dozing. Keep a consistent bedtime of 10 PM to about 5:30 AM.      He will follow up with me in about 1 year(s).     Twenty-five minutes (8:29-8:54 AM) spent with patient, all of which were spent face-to-face counseling, consulting, coordinating plan of care.      Bennett Goltz, PA-C    CC: No ref. provider found

## 2020-06-11 NOTE — PATIENT INSTRUCTIONS
Your BMI is Body mass index is 35.19 kg/m .  Weight management is a personal decision.  If you are interested in exploring weight loss strategies, the following discussion covers the approaches that may be successful. Body mass index (BMI) is one way to tell whether you are at a healthy weight, overweight, or obese. It measures your weight in relation to your height.  A BMI of 18.5 to 24.9 is in the healthy range. A person with a BMI of 25 to 29.9 is considered overweight, and someone with a BMI of 30 or greater is considered obese. More than two-thirds of American adults are considered overweight or obese.  Being overweight or obese increases the risk for further weight gain. Excess weight may lead to heart disease and diabetes.  Creating and following plans for healthy eating and physical activity may help you improve your health.  Weight control is part of healthy lifestyle and includes exercise, emotional health, and healthy eating habits. Careful eating habits lifelong are the mainstay of weight control. Though there are significant health benefits from weight loss, long-term weight loss with diet alone may be very difficult to achieve- studies show long-term success with dietary management in less than 10% of people. Attaining a healthy weight may be especially difficult to achieve in those with severe obesity. In some cases, medications, devices and surgical management might be considered.  What can you do?  If you are overweight or obese and are interested in methods for weight loss, you should discuss this with your provider.     Consider reducing daily calorie intake by 500 calories.     Keep a food journal.     Avoiding skipping meals, consider cutting portions instead.    Diet combined with exercise helps maintain muscle while optimizing fat loss. Strength training is particularly important for building and maintaining muscle mass. Exercise helps reduce stress, increase energy, and improves fitness.  Increasing exercise without diet control, however, may not burn enough calories to loose weight.       Start walking three days a week 10-20 minutes at a time    Work towards walking thirty minutes five days a week     Eventually, increase the speed of your walking for 1-2 minutes at time    In addition, we recommend that you review healthy lifestyles and methods for weight loss available through the National Institutes of Health patient information sites:  http://win.niddk.nih.gov/publications/index.htm    And look into health and wellness programs that may be available through your health insurance provider, employer, local community center, or [employer]iana club.    Weight management plan: Patient was referred to their PCP to discuss a diet and exercise plan.

## 2020-06-12 ENCOUNTER — VIRTUAL VISIT (OUTPATIENT)
Dept: SLEEP MEDICINE | Facility: CLINIC | Age: 75
End: 2020-06-12
Payer: COMMERCIAL

## 2020-06-12 DIAGNOSIS — G47.33 OSA (OBSTRUCTIVE SLEEP APNEA): Primary | ICD-10-CM

## 2020-06-12 PROCEDURE — 99214 OFFICE O/P EST MOD 30 MIN: CPT | Mod: GT | Performed by: PHYSICIAN ASSISTANT

## 2020-07-23 ENCOUNTER — TRANSFERRED RECORDS (OUTPATIENT)
Dept: HEALTH INFORMATION MANAGEMENT | Facility: CLINIC | Age: 75
End: 2020-07-23

## 2020-07-25 DIAGNOSIS — I10 BENIGN ESSENTIAL HYPERTENSION: ICD-10-CM

## 2020-07-25 DIAGNOSIS — N40.1 BENIGN PROSTATIC HYPERPLASIA WITH URINARY FREQUENCY: ICD-10-CM

## 2020-07-25 DIAGNOSIS — R35.0 BENIGN PROSTATIC HYPERPLASIA WITH URINARY FREQUENCY: ICD-10-CM

## 2020-07-28 RX ORDER — TAMSULOSIN HYDROCHLORIDE 0.4 MG/1
CAPSULE ORAL
Qty: 90 CAPSULE | Refills: 0 | Status: SHIPPED | OUTPATIENT
Start: 2020-07-28 | End: 2020-10-20

## 2020-07-28 RX ORDER — LISINOPRIL AND HYDROCHLOROTHIAZIDE 12.5; 2 MG/1; MG/1
2 TABLET ORAL DAILY
Qty: 180 TABLET | Refills: 0 | Status: SHIPPED | OUTPATIENT
Start: 2020-07-28 | End: 2020-10-20

## 2020-07-28 NOTE — TELEPHONE ENCOUNTER
"Tamsulosin and Lisinopril-hydrochlorothiazide    Last OV on 12/19/19  Failed protocol    Requested Prescriptions   Pending Prescriptions Disp Refills     lisinopril-hydrochlorothiazide (ZESTORETIC) 20-12.5 MG tablet [Pharmacy Med Name: Lisinopril-hydroCHLOROthiazide Oral Tablet 20-12.5 MG] 180 tablet 0     Sig: Take 2 tablets by mouth daily       Diuretics (Including Combos) Protocol Failed - 7/25/2020  2:52 PM        Failed - Normal serum creatinine on file in past 12 months     Recent Labs   Lab Test 12/09/19  1014   CR 1.51*              Passed - Blood pressure under 140/90 in past 12 months     BP Readings from Last 3 Encounters:   12/19/19 136/68   10/14/19 128/66   06/27/19 132/70                 Passed - Recent (12 mo) or future (30 days) visit within the authorizing provider's specialty     Patient has had an office visit with the authorizing provider or a provider within the authorizing providers department within the previous 12 mos or has a future within next 30 days. See \"Patient Info\" tab in inbasket, or \"Choose Columns\" in Meds & Orders section of the refill encounter.              Passed - Medication is active on med list        Passed - Patient is age 18 or older        Passed - Normal serum potassium on file in past 12 months     Recent Labs   Lab Test 12/09/19  1014   POTASSIUM 4.1                    Passed - Normal serum sodium on file in past 12 months     Recent Labs   Lab Test 12/09/19  1014                ACE Inhibitors (Including Combos) Protocol Failed - 7/25/2020  2:52 PM        Failed - Normal serum creatinine on file in past 12 months     Recent Labs   Lab Test 12/09/19  1014   CR 1.51*       Ok to refill medication if creatinine is low          Passed - Blood pressure under 140/90 in past 12 months     BP Readings from Last 3 Encounters:   12/19/19 136/68   10/14/19 128/66   06/27/19 132/70                 Passed - Recent (12 mo) or future (30 days) visit within the authorizing " "provider's specialty     Patient has had an office visit with the authorizing provider or a provider within the authorizing providers department within the previous 12 mos or has a future within next 30 days. See \"Patient Info\" tab in inbasket, or \"Choose Columns\" in Meds & Orders section of the refill encounter.              Passed - Medication is active on med list        Passed - Patient is age 18 or older        Passed - Normal serum potassium on file in past 12 months     Recent Labs   Lab Test 12/09/19  1014   POTASSIUM 4.1                tamsulosin (FLOMAX) 0.4 MG capsule [Pharmacy Med Name: Tamsulosin HCl Oral Capsule 0.4 MG] 90 capsule 0     Sig: TAKE 1 CAPSULE (0.4MG) BY MOUTH DAILY       Alpha Blockers Passed - 7/25/2020  2:52 PM        Passed - Blood pressure under 140/90 in past 12 months     BP Readings from Last 3 Encounters:   12/19/19 136/68   10/14/19 128/66   06/27/19 132/70                 Passed - Recent (12 mo) or future (30 days) visit within the authorizing provider's specialty     Patient has had an office visit with the authorizing provider or a provider within the authorizing providers department within the previous 12 mos or has a future within next 30 days. See \"Patient Info\" tab in inClearMyMailet, or \"Choose Columns\" in Meds & Orders section of the refill encounter.              Passed - Patient does not have Tadalafil, Vardenafil, or Sildenafil on their medication list        Passed - Medication is active on med list        Passed - Patient is 18 years of age or older             "

## 2020-08-18 ENCOUNTER — OFFICE VISIT (OUTPATIENT)
Dept: INTERNAL MEDICINE | Facility: CLINIC | Age: 75
End: 2020-08-18
Payer: COMMERCIAL

## 2020-08-18 VITALS
RESPIRATION RATE: 16 BRPM | WEIGHT: 199.4 LBS | SYSTOLIC BLOOD PRESSURE: 102 MMHG | DIASTOLIC BLOOD PRESSURE: 60 MMHG | TEMPERATURE: 98.6 F | OXYGEN SATURATION: 96 % | HEIGHT: 64 IN | BODY MASS INDEX: 34.04 KG/M2 | HEART RATE: 91 BPM

## 2020-08-18 DIAGNOSIS — E78.5 HYPERLIPIDEMIA LDL GOAL <130: ICD-10-CM

## 2020-08-18 DIAGNOSIS — Z00.00 HEALTHCARE MAINTENANCE: Primary | ICD-10-CM

## 2020-08-18 LAB
ERYTHROCYTE [DISTWIDTH] IN BLOOD BY AUTOMATED COUNT: 13.1 % (ref 10–15)
HCT VFR BLD AUTO: 44.3 % (ref 40–53)
HGB BLD-MCNC: 14.8 G/DL (ref 13.3–17.7)
MCH RBC QN AUTO: 31.8 PG (ref 26.5–33)
MCHC RBC AUTO-ENTMCNC: 33.4 G/DL (ref 31.5–36.5)
MCV RBC AUTO: 95 FL (ref 78–100)
PLATELET # BLD AUTO: 166 10E9/L (ref 150–450)
RBC # BLD AUTO: 4.65 10E12/L (ref 4.4–5.9)
WBC # BLD AUTO: 6.4 10E9/L (ref 4–11)

## 2020-08-18 PROCEDURE — 80048 BASIC METABOLIC PNL TOTAL CA: CPT | Performed by: NURSE PRACTITIONER

## 2020-08-18 PROCEDURE — 85027 COMPLETE CBC AUTOMATED: CPT | Performed by: NURSE PRACTITIONER

## 2020-08-18 PROCEDURE — 99397 PER PM REEVAL EST PAT 65+ YR: CPT | Performed by: NURSE PRACTITIONER

## 2020-08-18 PROCEDURE — 36415 COLL VENOUS BLD VENIPUNCTURE: CPT | Performed by: NURSE PRACTITIONER

## 2020-08-18 PROCEDURE — 80061 LIPID PANEL: CPT | Performed by: NURSE PRACTITIONER

## 2020-08-18 PROCEDURE — 84460 ALANINE AMINO (ALT) (SGPT): CPT | Performed by: NURSE PRACTITIONER

## 2020-08-18 RX ORDER — ATORVASTATIN CALCIUM 40 MG/1
40 TABLET, FILM COATED ORAL DAILY
Qty: 30 TABLET | Refills: 0 | Status: SHIPPED | OUTPATIENT
Start: 2020-08-18 | End: 2020-09-22

## 2020-08-18 ASSESSMENT — ENCOUNTER SYMPTOMS
PALPITATIONS: 0
NERVOUS/ANXIOUS: 0
HEARTBURN: 0
COUGH: 1
SHORTNESS OF BREATH: 0
DYSURIA: 0
MYALGIAS: 0
NAUSEA: 0
FEVER: 0
SORE THROAT: 0
PARESTHESIAS: 0
HEMATOCHEZIA: 0
ABDOMINAL PAIN: 0
HEADACHES: 0
DIZZINESS: 0
FREQUENCY: 1
WEAKNESS: 0
HEMATURIA: 0
EYE PAIN: 0
CHILLS: 0
JOINT SWELLING: 0
DIARRHEA: 0
ARTHRALGIAS: 1

## 2020-08-18 ASSESSMENT — ACTIVITIES OF DAILY LIVING (ADL): CURRENT_FUNCTION: NO ASSISTANCE NEEDED

## 2020-08-18 ASSESSMENT — MIFFLIN-ST. JEOR: SCORE: 1555.47

## 2020-08-18 NOTE — LETTER
August 19, 2020      Rodney Hendrickson  [address] [facility] [id number]        Dear ,    We are writing to inform you of your test results.    Your recent lab results show mildly elevated triglycerides and stable kidney function.  Please continue all your current medications and follow up with your PCP in 3 months.     Resulted Orders   CBC with platelets   Result Value Ref Range    WBC 6.4 4.0 - 11.0 10e9/L    RBC Count 4.65 4.4 - 5.9 10e12/L    Hemoglobin 14.8 13.3 - 17.7 g/dL    Hematocrit 44.3 40.0 - 53.0 %    MCV 95 78 - 100 fl    MCH 31.8 26.5 - 33.0 pg    MCHC 33.4 31.5 - 36.5 g/dL    RDW 13.1 10.0 - 15.0 %    Platelet Count 166 150 - 450 10e9/L   Basic metabolic panel   Result Value Ref Range    Sodium 138 133 - 144 mmol/L    Potassium 3.9 3.4 - 5.3 mmol/L    Chloride 106 94 - 109 mmol/L    Carbon Dioxide 22 20 - 32 mmol/L    Anion Gap 10 3 - 14 mmol/L    Glucose 130 (H) 70 - 99 mg/dL      Comment:      Non Fasting    Urea Nitrogen 34 (H) 7 - 30 mg/dL    Creatinine 1.65 (H) 0.66 - 1.25 mg/dL    GFR Estimate 40 (L) >60 mL/min/[1.73_m2]      Comment:      Non  GFR Calc  Starting 12/18/2018, serum creatinine based estimated GFR (eGFR) will be   calculated using the Chronic Kidney Disease Epidemiology Collaboration   (CKD-EPI) equation.      GFR Estimate If Black 46 (L) >60 mL/min/[1.73_m2]      Comment:       GFR Calc  Starting 12/18/2018, serum creatinine based estimated GFR (eGFR) will be   calculated using the Chronic Kidney Disease Epidemiology Collaboration   (CKD-EPI) equation.      Calcium 9.1 8.5 - 10.1 mg/dL   ALT   Result Value Ref Range    ALT 41 0 - 70 U/L   Lipid Profile   Result Value Ref Range    Cholesterol 186 <200 mg/dL    Triglycerides 222 (H) <150 mg/dL      Comment:      Borderline high:  150-199 mg/dl  High:             200-499 mg/dl  Very high:       >499 mg/dl  Non Fasting      HDL Cholesterol 45 >39 mg/dL    LDL Cholesterol Calculated 97  <100 mg/dL      Comment:      Desirable:       <100 mg/dl    Non HDL Cholesterol 141 (H) <130 mg/dL      Comment:      Above Desirable:  130-159 mg/dl  Borderline high:  160-189 mg/dl  High:             190-219 mg/dl  Very high:       >219 mg/dl         If you have any questions or concerns, please call the clinic at the number listed above.       Sincerely,        Beatrice Mendoza NP

## 2020-08-18 NOTE — PROGRESS NOTES
"SUBJECTIVE:   Rodney Hendrickson is a 74 year old male who presents for Preventive Visit.      Are you in the first 12 months of your Medicare coverage?  No    Healthy Habits:     In general, how would you rate your overall health?  Good    Frequency of exercise:  6-7 days/week    Duration of exercise:  45-60 minutes    Do you usually eat at least 4 servings of fruit and vegetables a day, include whole grains    & fiber and avoid regularly eating high fat or \"junk\" foods?  No    Medication side effects:  None    Ability to successfully perform activities of daily living:  No assistance needed    Home Safety:  No safety concerns identified    Hearing Impairment:  No hearing concerns    In the past 6 months, have you been bothered by leaking of urine?  No    In general, how would you rate your overall mental or emotional health?  Good      PHQ-2 Total Score: 0    Additional concerns today:  No    Do you feel safe in your environment? Yes    Have you ever done Advance Care Planning? (For example, a Health Directive, POLST, or a discussion with a medical provider or your loved ones about your wishes): Yes, patient states has an Advance Care Planning document and will bring a copy to the clinic.    Fall risk       Cognitive Screening   1) Repeat 3 items (Leader, Season, Table)    2) Clock draw: NORMAL  3) 3 item recall: Recalls 3 objects  Results: 3 items recalled: COGNITIVE IMPAIRMENT LESS LIKELY    Mini-CogTM Copyright [employer]. Licensed by the author for use in [facility]; reprinted with permission ([email or web address]). All rights reserved.      Do you have sleep apnea, excessive snoring or daytime drowsiness?: yes    Reviewed and updated as needed this visit by clinical staff  Tobacco  Allergies  Meds  Med Hx  Surg Hx  Fam Hx  Soc Hx        Reviewed and updated as needed this visit by Provider        Social History     Tobacco Use     Smoking status: Former Smoker     Last attempt to quit: 4/30/1983     " Years since quittin.3     Smokeless tobacco: Never Used   Substance Use Topics     Alcohol use: Yes     Comment: occa     If you drink alcohol do you typically have >3 drinks per day or >7 drinks per week? No    Alcohol Use 2020   Prescreen: >3 drinks/day or >7 drinks/week? No   Prescreen: >3 drinks/day or >7 drinks/week? -               Current providers sharing in care for this patient include:   Patient Care Team:  Brennan Mercado MD as PCP - General (Internal Medicine)  Beatrice Mendoza NP as Assigned PCP    The following health maintenance items are reviewed in Epic and correct as of today:  Health Maintenance   Topic Date Due     HEPATITIS C SCREENING  1945     ZOSTER IMMUNIZATION (1 of 2) 1995     AORTIC ANEURYSM SCREENING (SYSTEM ASSIGNED)  2010     PHQ-2  2020     MEDICARE ANNUAL WELLNESS VISIT  2020     FALL RISK ASSESSMENT  2020     INFLUENZA VACCINE (1) 2020     DTAP/TDAP/TD IMMUNIZATION (4 - Td) 2022     ADVANCE CARE PLANNING  2023     LIPID  2024     COLORECTAL CANCER SCREENING  2027     PNEUMOCOCCAL IMMUNIZATION 65+ LOW/MEDIUM RISK  Completed     IPV IMMUNIZATION  Aged Out     MENINGITIS IMMUNIZATION  Aged Out     HEPATITIS B IMMUNIZATION  Aged Out     BP Readings from Last 3 Encounters:   20 102/60   19 136/68   10/14/19 128/66    Wt Readings from Last 3 Encounters:   20 90.4 kg (199 lb 6.4 oz)   20 93 kg (205 lb)   19 95.8 kg (211 lb 4.8 oz)                  Patient Active Problem List   Diagnosis     Benign non-nodular prostatic hyperplasia with lower urinary tract symptoms     Hyperlipidemia LDL goal <130     Hypertension     Morbid obesity due to excess calories (H)     DOMINIK (obstructive sleep apnea)     CKD (chronic kidney disease) stage 3, GFR 30-59 ml/min (H)     Past Surgical History:   Procedure Laterality Date     ARTHROPLASTY PATELLO-FEMORAL (KNEE)      right knee replacement      COLONOSCOPY       COLONOSCOPY N/A 2017    Procedure: COLONOSCOPY;  Surgeon: Geoffrey Gallegos MD;  Location:  GI     ESOPHAGOSCOPY, GASTROSCOPY, DUODENOSCOPY (EGD), COMBINED  5/3/2013    Procedure: COMBINED ESOPHAGOSCOPY, GASTROSCOPY, DUODENOSCOPY (EGD), BIOPSY SINGLE OR MULTIPLE;  ESOPHAGOSCOPY, GASTROSCOPY, DUODENOSCOPY (EGD) with biopies;  Surgeon: Geoffrey Gallegos MD;  Location:  GI     ESOPHAGOSCOPY, GASTROSCOPY, DUODENOSCOPY (EGD), COMBINED N/A 2016    Procedure: COMBINED ESOPHAGOSCOPY, GASTROSCOPY, DUODENOSCOPY (EGD), BIOPSY SINGLE OR MULTIPLE;  Surgeon: Geoffrey Gallegos MD;  Location:  GI     ESOPHAGOSCOPY, GASTROSCOPY, DUODENOSCOPY (EGD), COMBINED N/A 2017    Procedure: COMBINED ESOPHAGOSCOPY, GASTROSCOPY, DUODENOSCOPY (EGD), BIOPSY SINGLE OR MULTIPLE;  Surgeon: Geoffrey Gallegos MD;  Location:  GI     ESOPHAGOSCOPY, GASTROSCOPY, DUODENOSCOPY (EGD), COMBINED N/A 2018    Procedure: COMBINED ESOPHAGOSCOPY, GASTROSCOPY, DUODENOSCOPY (EGD), BIOPSY SINGLE OR MULTIPLE;  ESOPHAGOSCOPY, GASTROSCOPY, DUODENOSCOPY (EGD) with biopsies using cold forceps;  Surgeon: Geoffrey Gallegos MD;  Location:  GI     ROTATOR CUFF REPAIR RT/LT      right and left       Social History     Tobacco Use     Smoking status: Former Smoker     Last attempt to quit: 1983     Years since quittin.3     Smokeless tobacco: Never Used   Substance Use Topics     Alcohol use: Yes     Comment: occa     Family History   Problem Relation Age of Onset     Hypertension Father      Colon Cancer Father          of Colon CA     Heart Defect Father         Enlarged heart     Hypertension Brother      Hypertension Sister      Hypertension Brother          Current Outpatient Medications   Medication Sig Dispense Refill     aspirin 81 MG tablet Take  by mouth daily.       atorvastatin (LIPITOR) 40 MG tablet Take 1 tablet (40 mg) by mouth daily 30 tablet 0     ferrous sulfate (IRON) 325 (65 FE) MG tablet  "Take by mouth At Bedtime        finasteride (PROSCAR) 5 MG tablet take 1 tablet (5 mg) by mouth daily 90 tablet 2     lisinopril-hydrochlorothiazide (ZESTORETIC) 20-12.5 MG tablet Take 2 tablets by mouth daily 180 tablet 0     omeprazole (PRILOSEC) 40 MG DR capsule take 1 capsule (40mg) by mouth daily. take 30 to 60 minutes before a meal. 90 capsule 2     tamsulosin (FLOMAX) 0.4 MG capsule TAKE 1 CAPSULE (0.4MG) BY MOUTH DAILY 90 capsule 0     Pneumonia Vaccine:Adults age 65+ who received Pneumovax (PPSV23) at 65 years or older: Should be given PCV13 > 1 year after their most recent PPSV23    Review of Systems   Constitutional: Negative for chills and fever.   HENT: Positive for hearing loss. Negative for congestion, ear pain and sore throat.    Eyes: Negative for pain and visual disturbance.   Respiratory: Positive for cough. Negative for shortness of breath.    Cardiovascular: Negative for chest pain, palpitations and peripheral edema.   Gastrointestinal: Negative for abdominal pain, diarrhea, heartburn, hematochezia and nausea.   Genitourinary: Positive for frequency, impotence and urgency. Negative for discharge, dysuria, genital sores and hematuria.   Musculoskeletal: Positive for arthralgias. Negative for joint swelling and myalgias.   Skin: Negative for rash.   Neurological: Negative for dizziness, weakness, headaches and paresthesias.   Psychiatric/Behavioral: Negative for mood changes. The patient is not nervous/anxious.      Constitutional, HEENT, cardiovascular, pulmonary, gi and gu systems are negative, except as otherwise noted.    OBJECTIVE:   /60 (BP Location: Right arm, Patient Position: Sitting, Cuff Size: Adult Large)   Pulse 91   Temp 98.6  F (37  C) (Oral)   Resp 16   Ht 1.626 m (5' 4\")   Wt 90.4 kg (199 lb 6.4 oz)   SpO2 96%   BMI 34.23 kg/m   Estimated body mass index is 34.23 kg/m  as calculated from the following:    Height as of this encounter: 1.626 m (5' 4\").    Weight as of " "this encounter: 90.4 kg (199 lb 6.4 oz).  Physical Exam  GENERAL: healthy, alert and no distress  EYES: Eyes grossly normal to inspection, PERRL and conjunctivae and sclerae normal  NECK: no adenopathy, no asymmetry, masses, or scars and thyroid normal to palpation  RESP: lungs clear to auscultation - no rales, rhonchi or wheezes  CV: regular rate and rhythm, normal S1 S2, no S3 or S4, no murmur, click or rub, no peripheral edema and peripheral pulses strong  ABDOMEN: soft, nontender, no hepatosplenomegaly, no masses and bowel sounds normal  MS: no gross musculoskeletal defects noted, no edema  PSYCH: mentation appears normal, affect normal/bright        ASSESSMENT / PLAN:       ICD-10-CM    1. Healthcare maintenance  Z00.00 CBC with platelets     Basic metabolic panel   2. Hyperlipidemia LDL goal <130  E78.5 atorvastatin (LIPITOR) 40 MG tablet     ALT     Lipid Profile       COUNSELING:  Reviewed preventive health counseling, as reflected in patient instructions    Estimated body mass index is 34.23 kg/m  as calculated from the following:    Height as of this encounter: 1.626 m (5' 4\").    Weight as of this encounter: 90.4 kg (199 lb 6.4 oz).         reports that he quit smoking about 37 years ago. He has never used smokeless tobacco.      Appropriate preventive services were discussed with this patient, including applicable screening as appropriate for cardiovascular disease, diabetes, osteopenia/osteoporosis, and glaucoma.  As appropriate for age/gender, discussed screening for colorectal cancer, prostate cancer, breast cancer, and cervical cancer. Checklist reviewing preventive services available has been given to the patient.    Reviewed patients plan of care and provided an AVS. The Basic Care Plan (routine screening as documented in Health Maintenance) for Rodney meets the Care Plan requirement. This Care Plan has been established and reviewed with the Patient.    Counseling Resources:  ATP IV " Guidelines  Pooled Cohorts Equation Calculator  Breast Cancer Risk Calculator  FRAX Risk Assessment  ICSI Preventive Guidelines  Dietary Guidelines for Americans, 2010  [employer]'s MyPlate  ASA Prophylaxis  Lung CA Screening    Beatrice Mendoza NP  [facility]    Identified Health Risks:

## 2020-08-18 NOTE — NURSING NOTE
"Wellness visit.  Vital signs:  Temp: 98.6  F (37  C) Temp src: Oral BP: 102/60 Pulse: 91   Resp: 16 SpO2: 96 %     Height: 162.6 cm (5' 4\") Weight: 90.4 kg (199 lb 6.4 oz)  Estimated body mass index is 34.23 kg/m  as calculated from the following:    Height as of this encounter: 1.626 m (5' 4\").    Weight as of this encounter: 90.4 kg (199 lb 6.4 oz).          "

## 2020-08-19 ENCOUNTER — TRANSFERRED RECORDS (OUTPATIENT)
Dept: HEALTH INFORMATION MANAGEMENT | Facility: CLINIC | Age: 75
End: 2020-08-19

## 2020-08-19 LAB
ALT SERPL W P-5'-P-CCNC: 41 U/L (ref 0–70)
ANION GAP SERPL CALCULATED.3IONS-SCNC: 10 MMOL/L (ref 3–14)
BUN SERPL-MCNC: 34 MG/DL (ref 7–30)
CALCIUM SERPL-MCNC: 9.1 MG/DL (ref 8.5–10.1)
CHLORIDE SERPL-SCNC: 106 MMOL/L (ref 94–109)
CHOLEST SERPL-MCNC: 186 MG/DL
CO2 SERPL-SCNC: 22 MMOL/L (ref 20–32)
CREAT SERPL-MCNC: 1.65 MG/DL (ref 0.66–1.25)
GFR SERPL CREATININE-BSD FRML MDRD: 40 ML/MIN/{1.73_M2}
GLUCOSE SERPL-MCNC: 130 MG/DL (ref 70–99)
HDLC SERPL-MCNC: 45 MG/DL
LDLC SERPL CALC-MCNC: 97 MG/DL
NONHDLC SERPL-MCNC: 141 MG/DL
POTASSIUM SERPL-SCNC: 3.9 MMOL/L (ref 3.4–5.3)
SODIUM SERPL-SCNC: 138 MMOL/L (ref 133–144)
TRIGL SERPL-MCNC: 222 MG/DL

## 2020-09-21 DIAGNOSIS — E78.5 HYPERLIPIDEMIA LDL GOAL <130: ICD-10-CM

## 2020-09-22 RX ORDER — ATORVASTATIN CALCIUM 40 MG/1
40 TABLET, FILM COATED ORAL DAILY
Qty: 90 TABLET | Refills: 2 | Status: SHIPPED | OUTPATIENT
Start: 2020-09-22 | End: 2021-07-06

## 2020-09-22 NOTE — TELEPHONE ENCOUNTER
Pending Prescriptions:                       Disp   Refills    atorvastatin (LIPITOR) 40 MG tablet [Phar*90 tab*2            Sig: Take 1 tablet (40 mg) by mouth daily    Prescription [place] per [facility] [place].

## 2020-09-30 ENCOUNTER — TRANSFERRED RECORDS (OUTPATIENT)
Dept: HEALTH INFORMATION MANAGEMENT | Facility: CLINIC | Age: 75
End: 2020-09-30

## 2020-10-15 ENCOUNTER — OFFICE VISIT (OUTPATIENT)
Dept: INTERNAL MEDICINE | Facility: CLINIC | Age: 75
End: 2020-10-15
Payer: COMMERCIAL

## 2020-10-15 VITALS
HEART RATE: 76 BPM | RESPIRATION RATE: 16 BRPM | TEMPERATURE: 96.6 F | OXYGEN SATURATION: 98 % | BODY MASS INDEX: 35.96 KG/M2 | DIASTOLIC BLOOD PRESSURE: 79 MMHG | SYSTOLIC BLOOD PRESSURE: 135 MMHG | WEIGHT: 209.5 LBS

## 2020-10-15 DIAGNOSIS — N40.1 BENIGN PROSTATIC HYPERPLASIA WITH URINARY FREQUENCY: ICD-10-CM

## 2020-10-15 DIAGNOSIS — E78.5 HYPERLIPIDEMIA LDL GOAL <130: ICD-10-CM

## 2020-10-15 DIAGNOSIS — N40.1 BENIGN NON-NODULAR PROSTATIC HYPERPLASIA WITH LOWER URINARY TRACT SYMPTOMS: ICD-10-CM

## 2020-10-15 DIAGNOSIS — N18.32 STAGE 3B CHRONIC KIDNEY DISEASE (H): ICD-10-CM

## 2020-10-15 DIAGNOSIS — I10 ESSENTIAL HYPERTENSION: ICD-10-CM

## 2020-10-15 DIAGNOSIS — I10 BENIGN ESSENTIAL HYPERTENSION: ICD-10-CM

## 2020-10-15 DIAGNOSIS — G47.33 OSA (OBSTRUCTIVE SLEEP APNEA): ICD-10-CM

## 2020-10-15 DIAGNOSIS — Z01.818 PRE-OP EXAM: Primary | ICD-10-CM

## 2020-10-15 DIAGNOSIS — R35.0 BENIGN PROSTATIC HYPERPLASIA WITH URINARY FREQUENCY: ICD-10-CM

## 2020-10-15 LAB
ANION GAP SERPL CALCULATED.3IONS-SCNC: 5 MMOL/L (ref 3–14)
BUN SERPL-MCNC: 23 MG/DL (ref 7–30)
CALCIUM SERPL-MCNC: 9.6 MG/DL (ref 8.5–10.1)
CHLORIDE SERPL-SCNC: 102 MMOL/L (ref 94–109)
CO2 SERPL-SCNC: 30 MMOL/L (ref 20–32)
CREAT SERPL-MCNC: 1.33 MG/DL (ref 0.66–1.25)
ERYTHROCYTE [DISTWIDTH] IN BLOOD BY AUTOMATED COUNT: 13.5 % (ref 10–15)
GFR SERPL CREATININE-BSD FRML MDRD: 52 ML/MIN/{1.73_M2}
GLUCOSE SERPL-MCNC: 119 MG/DL (ref 70–99)
HCT VFR BLD AUTO: 39.8 % (ref 40–53)
HGB BLD-MCNC: 13.1 G/DL (ref 13.3–17.7)
MCH RBC QN AUTO: 32.3 PG (ref 26.5–33)
MCHC RBC AUTO-ENTMCNC: 32.9 G/DL (ref 31.5–36.5)
MCV RBC AUTO: 98 FL (ref 78–100)
PLATELET # BLD AUTO: 191 10E9/L (ref 150–450)
POTASSIUM SERPL-SCNC: 3.9 MMOL/L (ref 3.4–5.3)
RBC # BLD AUTO: 4.05 10E12/L (ref 4.4–5.9)
SODIUM SERPL-SCNC: 137 MMOL/L (ref 133–144)
WBC # BLD AUTO: 5.7 10E9/L (ref 4–11)

## 2020-10-15 PROCEDURE — 80048 BASIC METABOLIC PNL TOTAL CA: CPT | Performed by: PHYSICIAN ASSISTANT

## 2020-10-15 PROCEDURE — 93000 ELECTROCARDIOGRAM COMPLETE: CPT | Performed by: PHYSICIAN ASSISTANT

## 2020-10-15 PROCEDURE — 85027 COMPLETE CBC AUTOMATED: CPT | Performed by: PHYSICIAN ASSISTANT

## 2020-10-15 PROCEDURE — 99215 OFFICE O/P EST HI 40 MIN: CPT | Performed by: PHYSICIAN ASSISTANT

## 2020-10-15 PROCEDURE — 36415 COLL VENOUS BLD VENIPUNCTURE: CPT | Performed by: PHYSICIAN ASSISTANT

## 2020-10-15 NOTE — PATIENT INSTRUCTIONS
"HOLD Aspirin 10 days prior to surgery    HOLD iron and lisinopril/ hydrochlorothiazide morning of surgery   Preparing for Your Surgery  Getting started  A surgery nurse will call you to review your health history and instructions. They will give you an arrival time based on your scheduled surgery time.  Please be ready to share the following:    Your doctor's clinic name and phone number    Your medical, surgical and anesthesia history    A list of allergies and sensitivities    A list of medicines, including herbal treatments and over-the-counter drugs    Whether the patient has a legal guardian (ask how to send us the papers in advance)  If your child is having surgery, please ask for a copy of Preparing for Your Child's Surgery.    Preparing for surgery    Within 30 days of surgery: Have an exam at your family clinic (primary care clinic), or go to a pre-operative clinic. This exam is called a \"History and Physical,\" or H&P.    At your H&P exam, talk to your care team about all medicines you take. If you need to stop any medicines before surgery, ask when to start taking them again.  ? We do this for your safety. Many medicines can make you bleed too much during surgery. Some change how well surgery (anesthesia) drugs work.    Call your insurance company to see what it will and won't pay for. Ask if they need to pre-approve the surgery. (If no insurance, call [phone].)    Call your surgeon's clinic if there's any change in your health. This includes signs of a cold or flu (sore throat, runny nose, cough, rash, fever). It also includes a scrape or scratch near the surgery site.    If you have questions on the day of surgery, call your surgery center.  Eating and drinking guidelines  For your safety: Unless your surgeon tells you otherwise, follow the guidelines below.    Eat and drink as usual until 8 hours before surgery. After that, no food or milk.    Drink clear liquids until 2 hours before surgery. These " are liquids you can see through, like water, Gatorade and Propel Water. You may also have black coffee and tea (no cream or milk).    Nothing by mouth within 2 hours of surgery. This includes gum, candy and breath mints.    Stop alcohol the midnight before surgery.    If your family clinic tells you to take medicine on the morning of surgery, it's okay to take it with a sip of water.  Preventing infection    Shower or bathe the night before and morning of your surgery. Follow the instructions your clinic gave you. (If no instructions, use regular soap.)    Don't shave or clip hair near your surgery site. This can lead to skin infection.    Don't smoke the morning of surgery. Smoking increases the risk of infection. You may chew nicotine gum up to 2 hours before surgery. A nicotine patch is okay.  ? Note: Some surgeries require you to completely quit smoking and nicotine. Check with your surgeon.    Your care team will make every effort to keep you safe from infection. We will:  ? Clean our hands often with soap and water (or an alcohol-based hand rub).  ? Clean the skin at your surgery site with a special soap that kills germs. We'll also remove hair from the site as needed.  ? Wear special hair covers, masks, gowns and gloves during surgery.  ? Give antibiotic medicine, if prescribed. Not all surgeries need antibiotics.  What to bring on the day of surgery    Photo ID and insurance card    Copy of your health care directive, if you have one    Glasses and hearing aides (bring cases)  ? You can't wear contacts during surgery    Inhaler and eye drops, if you use them (tell us about these when you arrive)    CPAP machine or breathing device, if you use them    A few personal items, if spending the night    If you have . . .  ? A pacemaker or ICD (cardiac defibrillator): Bring the ID card.  ? An implanted stimulator: Bring the remote control.  ? A legal guardian: Bring a copy of the certified (court-stamped)  guardianship papers.  Please remove any jewelry, including body piercings. Leave jewelry and other valuables at home.  If you're going home the day of surgery  Important: If you don't follow the rules below, we must cancel your surgery.     Arrange for someone to drive you home after surgery. You may not drive, take a taxi or take public transportation by yourself (unless you'll have local anesthesia only).    Arrange for a responsible adult to stay with you overnight. If you don't, we may keep you in the hospital overnight, and you may need to pay the costs yourself.  Questions?   If you have any questions for your care team, list them here: _________________________________________________________________________________________________________________________________________________________________________________________________________________________________________________________________________________________________________________________  For informational purposes only. Not to replace the advice of your health care provider. Copyright   7259-2768 [employer]. All rights reserved. Clinically reviewed by Michelle Mckenzie MD. [employer] [id number] - REV 07/19.

## 2020-10-15 NOTE — PROGRESS NOTES
[address] [address]  [phone]  Dept: [phone]    PRE-OP EVALUATION:  Today's date: 10/15/2020    Rodney Hendrickson (: 1945) presents for pre-operative evaluation assessment as requested by Dr. Kemp.  He requires evaluation and anesthesia risk assessment prior to undergoing surgery/procedure for treatment of Left Knee Replacement .    Proposed Surgery/ Procedure: Left Knee Replacement  Date of Surgery/ Procedure: 10/28/2020  Time of Surgery/ Procedure: [facility]  Hospital/Surgical Facility: [facility]  Surgery Fax Number: [phone]  Primary Physician: Brennan Mercado  Type of Anesthesia Anticipated: General    Preoperative Questionnaire:   No - Have you ever had a heart attack or stroke?  No - Have you ever had surgery on your heart or blood vessels, such as a stent, coronary (heart) bypass, or surgery on an artery in the head, neck, heart, or legs?  No - Do you have chest pain when you are physically active?  No - Do you have a history of heart failure?  No - Do you currently have a cold, bronchitis, or symptoms of other respiratory (head and chest) infections?  No - Do you have a cough, shortness of breath, or wheezing?  No - Do you or anyone in your family have a history of blood clots?  No - Do you or anyone in your family have a serious bleeding problem, such as long-lasting bleeding after surgeries or cuts?  YES- Have you ever had anemia or been told to take iron pills?  No - Have you had any abnormal blood loss such as black, tarry or bloody stools, or abnormal vaginal bleeding?  No - Have you ever had a blood transfusion?  Yes - Are you willing to have a blood transfusion if it is medically needed before, during, or after your surgery?  No - Have you or anyone in your family ever had problems with anesthesia (sedation for surgery)?  YES - Do you have sleep apnea, excessive snoring, or daytime drowsiness? DOMINIK with  CPAP  No - Do you have any artifical heart valves or other implanted medical devices, such as a pacemaker, defibrillator, or continuous glucose monitor?  YES- Do you have any artifical joints? Right knee  No - Are you allergic to latex?  No - Is there any chance that you may be pregnant?    Patient has a Health Care Directive or Living Will:  YES Living Will    HPI:     HPI related to upcoming procedure: TKA on left side due to arthritis     Patient reports history of low hemoglobin and was placed on iron.    He has high blood pressure on lisinopril and hydrochlorothiazide. He also notes to have BPH, chronic kdiney disease, and high cholesterol.    He has DOMINIK and uses a CPAP.     See problem list for active medical problems.  Problems all longstanding and stable, except as noted/documented.  See ROS for pertinent symptoms related to these conditions.      MEDICAL HISTORY:     Patient Active Problem List    Diagnosis Date Noted     CKD (chronic kidney disease) stage 3, GFR 30-59 ml/min 12/19/2019     Priority: Medium     DOMINIK (obstructive sleep apnea) 05/23/2017     Priority: Medium     Benign non-nodular prostatic hyperplasia with lower urinary tract symptoms 03/15/2017     Priority: Medium     Morbid obesity due to excess calories (H) 03/15/2017     Priority: Medium     Hyperlipidemia LDL goal <130      Priority: Medium     Hypertension      Priority: Medium      Past Medical History:   Diagnosis Date     BPH (benign prostatic hyperplasia)      GERD (gastroesophageal reflux disease)      Hyperlipidemia LDL goal <130      Hypertension      Past Surgical History:   Procedure Laterality Date     ARTHROPLASTY PATELLO-FEMORAL (KNEE)      right knee replacement     COLONOSCOPY       COLONOSCOPY N/A 1/18/2017    Procedure: COLONOSCOPY;  Surgeon: Geoffrey Gallegos MD;  Location:  GI     ESOPHAGOSCOPY, GASTROSCOPY, DUODENOSCOPY (EGD), COMBINED  5/3/2013    Procedure: COMBINED ESOPHAGOSCOPY, GASTROSCOPY, DUODENOSCOPY  (EGD), BIOPSY SINGLE OR MULTIPLE;  ESOPHAGOSCOPY, GASTROSCOPY, DUODENOSCOPY (EGD) with biopies;  Surgeon: Geoffrey Gallegos MD;  Location: [facility]     ESOPHAGOSCOPY, GASTROSCOPY, DUODENOSCOPY (EGD), COMBINED N/A 2016    Procedure: COMBINED ESOPHAGOSCOPY, GASTROSCOPY, DUODENOSCOPY (EGD), BIOPSY SINGLE OR MULTIPLE;  Surgeon: Geoffrey Gallegos MD;  Location: [facility]     ESOPHAGOSCOPY, GASTROSCOPY, DUODENOSCOPY (EGD), COMBINED N/A 2017    Procedure: COMBINED ESOPHAGOSCOPY, GASTROSCOPY, DUODENOSCOPY (EGD), BIOPSY SINGLE OR MULTIPLE;  Surgeon: Geoffrey Gallegos MD;  Location: [facility]     ESOPHAGOSCOPY, GASTROSCOPY, DUODENOSCOPY (EGD), COMBINED N/A 2018    Procedure: COMBINED ESOPHAGOSCOPY, GASTROSCOPY, DUODENOSCOPY (EGD), BIOPSY SINGLE OR MULTIPLE;  ESOPHAGOSCOPY, GASTROSCOPY, DUODENOSCOPY (EGD) with biopsies using cold forceps;  Surgeon: Geoffrey Gallegos MD;  Location: [facility]     ROTATOR CUFF REPAIR RT/LT      right and left     Current Outpatient Medications   Medication Sig Dispense Refill     aspirin 81 MG tablet Take  by mouth daily.       atorvastatin (LIPITOR) 40 MG tablet Take 1 tablet (40 mg) by mouth daily 90 tablet 2     ferrous sulfate (IRON) 325 (65 FE) MG tablet Take by mouth At Bedtime        finasteride (PROSCAR) 5 MG tablet take 1 tablet (5 mg) by mouth daily 90 tablet 2     lisinopril-hydrochlorothiazide (ZESTORETIC) 20-12.5 MG tablet Take 2 tablets by mouth daily 180 tablet 0     omeprazole (PRILOSEC) 40 MG DR capsule take 1 capsule (40mg) by mouth daily. take 30 to 60 minutes before a meal. 90 capsule 2     tamsulosin (FLOMAX) 0.4 MG capsule TAKE 1 CAPSULE (0.4MG) BY MOUTH DAILY 90 capsule 0     OTC products: None, except as noted above    No Known Allergies   Latex Allergy: NO    Social History     Tobacco Use     Smoking status: Former Smoker     Quit date: 1983     Years since quittin.4     Smokeless tobacco: Never Used   Substance Use Topics     Alcohol use: Yes     Comment:  occa     History   Drug Use No       REVIEW OF SYSTEMS:   CONSTITUTIONAL: NEGATIVE for fever, chills, change in weight  INTEGUMENTARY/SKIN: NEGATIVE for worrisome rashes, moles or lesions  EYES: NEGATIVE for vision changes   ENT/MOUTH: NEGATIVE for ear, mouth and throat problems  RESP: NEGATIVE for significant cough or SOB  CV: NEGATIVE for chest pain, palpitations or peripheral edema  GI: NEGATIVE for nausea, abdominal pain, heartburn, or change in bowel habits  : NEGATIVE for frequency, dysuria, or hematuria  MUSCULOSKELETAL: NEGATIVE for significant arthralgias or myalgia  NEURO: NEGATIVE for weakness, dizziness or paresthesias  ENDOCRINE: NEGATIVE for temperature intolerance, skin/hair changes  HEME: NEGATIVE for bleeding problems  PSYCHIATRIC: NEGATIVE for changes in mood or affect    EXAM:   /79 (BP Location: Right arm, Patient Position: Chair, Cuff Size: Adult Large)   Pulse 76   Temp 96.6  F (35.9  C) (Temporal)   Resp 16   Wt 95 kg (209 lb 8 oz)   SpO2 98%   BMI 35.96 kg/m      GENERAL APPEARANCE: healthy, alert and no distress     EYES: EOMI,  PERRL     HENT: ear canals and TM's normal and nose and mouth without ulcers or lesions     NECK: no adenopathy, no asymmetry, masses, or scars and thyroid normal to palpation     RESP: lungs clear to auscultation - no rales, rhonchi or wheezes     CV: regular rates and rhythm, normal S1 S2, no S3 or S4 and no murmur, click or rub     ABDOMEN:  soft, nontender, no HSM or masses and bowel sounds normal     MS: extremities normal-     SKIN: no suspicious lesions or rashes     NEURO: Normal strength and tone, mentation intact and speech normal     PSYCH: mentation appears normal. and affect normal/bright     LYMPHATICS: No cervical adenopathy    DIAGNOSTICS:   EKG: RBBB     Labs are pending     Recent Labs   Lab Test 08/18/20  1518 12/09/19  1014 05/23/17  1150 05/23/17  1150 05/11/16   HGB 14.8 13.2*   < > 13.7  --     183   < > 183  --      139   < > 140  --    POTASSIUM 3.9 4.1   < > 3.7 4.4   CR 1.65* 1.51*   < > 1.25 1.4*   A1C  --   --   --  6.1* 6.4*    < > = values in this interval not displayed.        IMPRESSION:   Reason for surgery/procedure: TKA left  Diagnosis/reason for consult: pre-surgical consult     The proposed surgical procedure is considered INTERMEDIATE risk.    REVISED CARDIAC RISK INDEX  The patient has the following serious cardiovascular risks for perioperative complications such as (MI, PE, VFib and 3  AV Block):  No serious cardiac risks  INTERPRETATION: 0 risks: Class I (very low risk - 0.4% complication rate)    The patient has the following additional risks for perioperative complications:  No identified additional risks      ICD-10-CM    1. Pre-op exam  Z01.818 EKG 12-lead complete w/read - Clinics     Basic metabolic panel  (Ca, Cl, CO2, Creat, Gluc, K, Na, BUN)     CBC with platelets   2. DOMINIK (obstructive sleep apnea)  G47.33    3. Essential hypertension  I10    4. Hyperlipidemia LDL goal <130  E78.5    5. Stage 3b chronic kidney disease  N18.32    6. Benign non-nodular prostatic hyperplasia with lower urinary tract symptoms  N40.1        RECOMMENDATIONS:     --Consult hospital rounder / IM to assist post-op medical management    --Patient is to take all scheduled medications on the day of surgery EXCEPT for modifications listed below.    Hypertension:  HOLD lisinopril-hydrochlorothiazide day of surgery     HOLD iron day of surgery     HOLD aspirin 10 days prior to surgery     APPROVAL GIVEN to proceed with proposed procedure, without further diagnostic evaluation       Signed Electronically by: Haven Almodovar PA-C    Copy of this evaluation report is provided to requesting physician.    [facility] Preop Guidelines    Revised Cardiac Risk Index

## 2020-10-16 ENCOUNTER — TELEPHONE (OUTPATIENT)
Dept: INTERNAL MEDICINE | Facility: CLINIC | Age: 75
End: 2020-10-16

## 2020-10-16 NOTE — TELEPHONE ENCOUNTER
Called patient, he reports he is having knee surgery 10/28/20, he had his pre-op 10/15/20. Patient was called by that clinic and was told to follow-up with his PCP regarding his hemoglobin, since it did drop slightly from 8/18/20. Please advise for follow-up.  Sirena Rodrigez RN

## 2020-10-16 NOTE — TELEPHONE ENCOUNTER
Patient calls asking if anything needs to be addressed with his recent hemoglobin if 13.1.  Please advise.

## 2020-10-20 RX ORDER — TAMSULOSIN HYDROCHLORIDE 0.4 MG/1
CAPSULE ORAL
Qty: 90 CAPSULE | Refills: 4 | Status: SHIPPED | OUTPATIENT
Start: 2020-10-20 | End: 2021-12-20

## 2020-10-20 RX ORDER — LISINOPRIL AND HYDROCHLOROTHIAZIDE 12.5; 2 MG/1; MG/1
2 TABLET ORAL DAILY
Qty: 180 TABLET | Refills: 0 | Status: SHIPPED | OUTPATIENT
Start: 2020-10-20 | End: 2021-01-08

## 2020-10-20 NOTE — TELEPHONE ENCOUNTER
Flomax: Medication refilled per [facility] protocol    Zestoretic: Routing refill request to provider for review/approval because:  Labs out of range:  Creat on 8/18/20 was 1.65

## 2020-10-28 ENCOUNTER — TRANSFERRED RECORDS (OUTPATIENT)
Dept: HEALTH INFORMATION MANAGEMENT | Facility: CLINIC | Age: 75
End: 2020-10-28

## 2020-11-02 ENCOUNTER — RECORDS - HEALTHEAST (OUTPATIENT)
Dept: ADMINISTRATIVE | Facility: OTHER | Age: 75
End: 2020-11-02

## 2020-11-02 LAB
LAB AP CHARGES (HE HISTORICAL CONVERSION): NORMAL
PATH REPORT.COMMENTS IMP SPEC: NORMAL
PATH REPORT.FINAL DX SPEC: NORMAL
PATH REPORT.GROSS SPEC: NORMAL
PATH REPORT.MICROSCOPIC SPEC OTHER STN: NORMAL
PATH REPORT.RELEVANT HX SPEC: NORMAL
RESULT FLAG (HE HISTORICAL CONVERSION): NORMAL

## 2020-11-13 ENCOUNTER — TRANSFERRED RECORDS (OUTPATIENT)
Dept: HEALTH INFORMATION MANAGEMENT | Facility: CLINIC | Age: 75
End: 2020-11-13

## 2020-11-24 ENCOUNTER — TELEPHONE (OUTPATIENT)
Dept: INTERNAL MEDICINE | Facility: CLINIC | Age: 75
End: 2020-11-24

## 2020-11-24 NOTE — TELEPHONE ENCOUNTER
Patient calls asking for something to help him sleep.  Patient had knee replacement surgery on 10/28/20 and had been having problems sleeping ever since.  Please calls wife cell phone per patient to advise.

## 2020-11-27 NOTE — TELEPHONE ENCOUNTER
Patient has tried 3 mg of Melatonin the first night, 6 mg the second and 9 mg last night and it has not helped him at all with sleeping. Call him back to advise. [phone]

## 2020-12-11 ENCOUNTER — TRANSFERRED RECORDS (OUTPATIENT)
Dept: HEALTH INFORMATION MANAGEMENT | Facility: CLINIC | Age: 75
End: 2020-12-11

## 2021-01-07 DIAGNOSIS — I10 BENIGN ESSENTIAL HYPERTENSION: ICD-10-CM

## 2021-01-08 DIAGNOSIS — K21.9 GASTROESOPHAGEAL REFLUX DISEASE WITHOUT ESOPHAGITIS: ICD-10-CM

## 2021-01-08 DIAGNOSIS — N40.1 BENIGN NON-NODULAR PROSTATIC HYPERPLASIA WITH LOWER URINARY TRACT SYMPTOMS: ICD-10-CM

## 2021-01-08 RX ORDER — LISINOPRIL AND HYDROCHLOROTHIAZIDE 12.5; 2 MG/1; MG/1
2 TABLET ORAL DAILY
Qty: 180 TABLET | Refills: 0 | Status: SHIPPED | OUTPATIENT
Start: 2021-01-08 | End: 2021-04-09

## 2021-01-08 RX ORDER — FINASTERIDE 5 MG/1
5 TABLET, FILM COATED ORAL DAILY
Qty: 90 TABLET | Refills: 1 | Status: SHIPPED | OUTPATIENT
Start: 2021-01-08 | End: 2021-07-06

## 2021-01-08 RX ORDER — OMEPRAZOLE 40 MG/1
CAPSULE, DELAYED RELEASE ORAL
Qty: 90 CAPSULE | Refills: 1 | Status: SHIPPED | OUTPATIENT
Start: 2021-01-08 | End: 2021-07-06

## 2021-01-08 NOTE — TELEPHONE ENCOUNTER
Pending Prescriptions:                       Disp   Refills    finasteride (PROSCAR) 5 MG tablet         90 tab*1            Sig: Take 1 tablet (5 mg) by mouth daily    omeprazole (PRILOSEC) 40 MG DR capsule    90 cap*1            Sig: take 1 capsule (40mg) by mouth daily. take 30 to           60 minutes before a meal.    Prescription approved per [facility] Refill Protocol.

## 2021-01-08 NOTE — TELEPHONE ENCOUNTER
Routing refill request to provider for review/approval because:  Labs out of range:  Cr  Ally Sauceda RN, BSN

## 2021-03-25 ENCOUNTER — TRANSFERRED RECORDS (OUTPATIENT)
Dept: HEALTH INFORMATION MANAGEMENT | Facility: CLINIC | Age: 76
End: 2021-03-25

## 2021-04-09 DIAGNOSIS — I10 BENIGN ESSENTIAL HYPERTENSION: ICD-10-CM

## 2021-04-09 RX ORDER — LISINOPRIL AND HYDROCHLOROTHIAZIDE 12.5; 2 MG/1; MG/1
2 TABLET ORAL DAILY
Qty: 180 TABLET | Refills: 0 | Status: SHIPPED | OUTPATIENT
Start: 2021-04-09 | End: 2021-07-06

## 2021-04-09 NOTE — TELEPHONE ENCOUNTER
Pending Prescriptions:                       Disp   Refills    lisinopril-hydrochlorothiazide (ZESTORETIC*180 ta*0        Sig: Take 2 tablets by mouth daily    Routing refill request to provider for review/approval because:  Creatinine   Date Value Ref Range Status   10/15/2020 1.33 (H) 0.66 - 1.25 mg/dL Final

## 2021-04-14 ENCOUNTER — OFFICE VISIT (OUTPATIENT)
Dept: INTERNAL MEDICINE | Facility: CLINIC | Age: 76
End: 2021-04-14
Payer: COMMERCIAL

## 2021-04-14 VITALS
RESPIRATION RATE: 16 BRPM | DIASTOLIC BLOOD PRESSURE: 72 MMHG | SYSTOLIC BLOOD PRESSURE: 142 MMHG | WEIGHT: 215 LBS | HEART RATE: 88 BPM | OXYGEN SATURATION: 96 % | HEIGHT: 64 IN | BODY MASS INDEX: 36.7 KG/M2 | TEMPERATURE: 98.6 F

## 2021-04-14 DIAGNOSIS — G47.33 OSA (OBSTRUCTIVE SLEEP APNEA): ICD-10-CM

## 2021-04-14 DIAGNOSIS — E78.5 HYPERLIPIDEMIA LDL GOAL <130: ICD-10-CM

## 2021-04-14 DIAGNOSIS — I10 ESSENTIAL HYPERTENSION: ICD-10-CM

## 2021-04-14 DIAGNOSIS — R25.2 MUSCLE CRAMPS: ICD-10-CM

## 2021-04-14 DIAGNOSIS — M16.11 PRIMARY OSTEOARTHRITIS OF RIGHT HIP: ICD-10-CM

## 2021-04-14 DIAGNOSIS — Z01.818 PRE-OP EXAM: Primary | ICD-10-CM

## 2021-04-14 LAB
ALBUMIN SERPL-MCNC: 4 G/DL (ref 3.4–5)
ALP SERPL-CCNC: 76 U/L (ref 40–150)
ALT SERPL W P-5'-P-CCNC: 32 U/L (ref 0–70)
ANION GAP SERPL CALCULATED.3IONS-SCNC: 3 MMOL/L (ref 3–14)
AST SERPL W P-5'-P-CCNC: 16 U/L (ref 0–45)
BILIRUB SERPL-MCNC: 0.5 MG/DL (ref 0.2–1.3)
BUN SERPL-MCNC: 32 MG/DL (ref 7–30)
CALCIUM SERPL-MCNC: 9.5 MG/DL (ref 8.5–10.1)
CHLORIDE SERPL-SCNC: 106 MMOL/L (ref 94–109)
CK SERPL-CCNC: 299 U/L (ref 30–300)
CO2 SERPL-SCNC: 32 MMOL/L (ref 20–32)
CREAT SERPL-MCNC: 1.52 MG/DL (ref 0.66–1.25)
ERYTHROCYTE [DISTWIDTH] IN BLOOD BY AUTOMATED COUNT: 12.9 % (ref 10–15)
GFR SERPL CREATININE-BSD FRML MDRD: 44 ML/MIN/{1.73_M2}
GLUCOSE SERPL-MCNC: 146 MG/DL (ref 70–99)
HCT VFR BLD AUTO: 40.2 % (ref 40–53)
HGB BLD-MCNC: 13.3 G/DL (ref 13.3–17.7)
MAGNESIUM SERPL-MCNC: 1.9 MG/DL (ref 1.6–2.3)
MCH RBC QN AUTO: 31.5 PG (ref 26.5–33)
MCHC RBC AUTO-ENTMCNC: 33.1 G/DL (ref 31.5–36.5)
MCV RBC AUTO: 95 FL (ref 78–100)
PLATELET # BLD AUTO: 219 10E9/L (ref 150–450)
POTASSIUM SERPL-SCNC: 4 MMOL/L (ref 3.4–5.3)
PROT SERPL-MCNC: 7.7 G/DL (ref 6.8–8.8)
RBC # BLD AUTO: 4.22 10E12/L (ref 4.4–5.9)
SODIUM SERPL-SCNC: 141 MMOL/L (ref 133–144)
WBC # BLD AUTO: 5.2 10E9/L (ref 4–11)

## 2021-04-14 PROCEDURE — 85027 COMPLETE CBC AUTOMATED: CPT | Performed by: INTERNAL MEDICINE

## 2021-04-14 PROCEDURE — 36415 COLL VENOUS BLD VENIPUNCTURE: CPT | Performed by: INTERNAL MEDICINE

## 2021-04-14 PROCEDURE — 80053 COMPREHEN METABOLIC PANEL: CPT | Performed by: INTERNAL MEDICINE

## 2021-04-14 PROCEDURE — 99214 OFFICE O/P EST MOD 30 MIN: CPT | Performed by: INTERNAL MEDICINE

## 2021-04-14 PROCEDURE — 82550 ASSAY OF CK (CPK): CPT | Performed by: INTERNAL MEDICINE

## 2021-04-14 PROCEDURE — 93000 ELECTROCARDIOGRAM COMPLETE: CPT | Performed by: INTERNAL MEDICINE

## 2021-04-14 PROCEDURE — 83735 ASSAY OF MAGNESIUM: CPT | Performed by: INTERNAL MEDICINE

## 2021-04-14 ASSESSMENT — MIFFLIN-ST. JEOR: SCORE: 1621.23

## 2021-04-14 NOTE — PROGRESS NOTES
[place] [address]  [facility] [address]  Phone: [phone]  Primary Provider: Steffen Mercado  Pre-op Performing Provider: STEFFEN MERCADO      PREOPERATIVE EVALUATION:  Today's date: 4/14/2021    Rodney Hendrickson is a 75 year old male who presents for a preoperative evaluation.    Surgical Information:  Surgery/Procedure: Right hip replacement   Surgery Location: [facility]   Surgeon: Dr. Schwarz   Surgery Date: 4/28/21  Time of Surgery: [phone]/ TBD   Where patient plans to recover: At home with family  Fax number for surgical facility: [phone]  Type of Anesthesia Anticipated: to be determined    Assessment & Plan     The proposed surgical procedure is considered INTERMEDIATE risk.    Pre-op exam  Assess lab work and EKG   - EKG 12-lead complete w/read - Clinics  - CBC with platelets  - Comprehensive metabolic panel  - CK total  - Magnesium    Primary osteoarthritis of right hip      Muscle cramps    - tiZANidine (ZANAFLEX) 4 MG tablet; Take 1 tablet by mouth every bedtime for muscle tightness for 10 days then use as needed  - CK total  - Magnesium    Hyperlipidemia LDL goal <130      Essential hypertension    - CBC with platelets  - Comprehensive metabolic panel    DOMINIK (obstructive sleep apnea) on CPAP           Risks and Recommendations:  The patient has the following additional risks and recommendations for perioperative complications:  Obstructive Sleep Apnea: monitor for apnea post anesthesia       Medication Instructions:  hold aspirin for one week prior to surgery     RECOMMENDATION:  APPROVAL GIVEN to proceed with proposed procedure, without further diagnostic evaluation.    Review of external notes as documented above           Subjective     HPI related to upcoming procedure: scheduled for right hip arthroplasty for history of OA.   No acute complaints, no medication change or new medical conditions.  Has h/o HTN. on medical treatment-  Zestoretic. BP has been controlled. No side effects from medications. No CP, HA, dizziness. good compliance with medications and low salt diet.  Has H/O hyperlipidemia. On medical treatment with statin and diet. No side effects. No muscle weakness, myalgias or upset stomach.   Has H/O BPH. On treatment with Flomax and Proscar . Has mild chronic symptoms of frequency, decreased urinary stream ,no dysuria. No side effects from medications.  Has DOMINIK, uses CPAP.       No flowsheet data found.    Health Care Directive:  Patient does not have a Health Care Directive or Living Will: Patient states has Advance Directive and will bring in a copy to clinic.    Preoperative Review of :   reviewed - no record of controlled substances prescribed.      Status of Chronic Conditions:  See problem list for active medical problems.  Problems all longstanding and stable, except as noted/documented.  See ROS for pertinent symptoms related to these conditions.      Review of Systems  CONSTITUTIONAL: NEGATIVE for fever, chills, change in weight  INTEGUMENTARY/SKIN: NEGATIVE for worrisome rashes, moles or lesions  EYES: NEGATIVE for vision changes or irritation  ENT/MOUTH: NEGATIVE for ear, mouth and throat problems  RESP: NEGATIVE for significant cough or SOB  BREAST: NEGATIVE for masses, tenderness or discharge  CV: NEGATIVE for chest pain, palpitations or peripheral edema  GI: NEGATIVE for nausea, abdominal pain, heartburn, or change in bowel habits  : NEGATIVE for frequency, dysuria, or hematuria  MUSCULOSKELETAL: NEGATIVE for significant arthralgias or myalgia, + for muscle cramps in the hands and legs, pain in the right hip with ambulation, inguinal area   NEURO: NEGATIVE for weakness, dizziness or paresthesias  ENDOCRINE: NEGATIVE for temperature intolerance, skin/hair changes  HEME: NEGATIVE for bleeding problems  PSYCHIATRIC: NEGATIVE for changes in mood or affect    Patient Active Problem List    Diagnosis Date Noted      CKD (chronic kidney disease) stage 3, GFR 30-59 ml/min 12/19/2019     Priority: Medium     DOMINIK (obstructive sleep apnea) 05/23/2017     Priority: Medium     Benign non-nodular prostatic hyperplasia with lower urinary tract symptoms 03/15/2017     Priority: Medium     Morbid obesity due to excess calories (H) 03/15/2017     Priority: Medium     Hyperlipidemia LDL goal <130      Priority: Medium     Hypertension      Priority: Medium      Past Medical History:   Diagnosis Date     BPH (benign prostatic hyperplasia)      GERD (gastroesophageal reflux disease)      Hyperlipidemia LDL goal <130      Hypertension      Past Surgical History:   Procedure Laterality Date     ARTHROPLASTY PATELLO-FEMORAL (KNEE)      right knee replacement     COLONOSCOPY       COLONOSCOPY N/A 1/18/2017    Procedure: COLONOSCOPY;  Surgeon: Geoffrey Gallegos MD;  Location:  GI     ESOPHAGOSCOPY, GASTROSCOPY, DUODENOSCOPY (EGD), COMBINED  5/3/2013    Procedure: COMBINED ESOPHAGOSCOPY, GASTROSCOPY, DUODENOSCOPY (EGD), BIOPSY SINGLE OR MULTIPLE;  ESOPHAGOSCOPY, GASTROSCOPY, DUODENOSCOPY (EGD) with biopies;  Surgeon: Geoffrey Gallegos MD;  Location:  GI     ESOPHAGOSCOPY, GASTROSCOPY, DUODENOSCOPY (EGD), COMBINED N/A 1/8/2016    Procedure: COMBINED ESOPHAGOSCOPY, GASTROSCOPY, DUODENOSCOPY (EGD), BIOPSY SINGLE OR MULTIPLE;  Surgeon: Geoffrey Gallegos MD;  Location:  GI     ESOPHAGOSCOPY, GASTROSCOPY, DUODENOSCOPY (EGD), COMBINED N/A 1/18/2017    Procedure: COMBINED ESOPHAGOSCOPY, GASTROSCOPY, DUODENOSCOPY (EGD), BIOPSY SINGLE OR MULTIPLE;  Surgeon: Geoffrey Gallegos MD;  Location:  GI     ESOPHAGOSCOPY, GASTROSCOPY, DUODENOSCOPY (EGD), COMBINED N/A 2/13/2018    Procedure: COMBINED ESOPHAGOSCOPY, GASTROSCOPY, DUODENOSCOPY (EGD), BIOPSY SINGLE OR MULTIPLE;  ESOPHAGOSCOPY, GASTROSCOPY, DUODENOSCOPY (EGD) with biopsies using cold forceps;  Surgeon: Geoffrey Gallegos MD;  Location:  GI     ROTATOR CUFF REPAIR RT/LT      right and left  "    Current Outpatient Medications   Medication Sig Dispense Refill     aspirin 81 MG tablet Take  by mouth daily.       atorvastatin (LIPITOR) 40 MG tablet Take 1 tablet (40 mg) by mouth daily 90 tablet 2     ferrous sulfate (IRON) 325 (65 FE) MG tablet Take by mouth At Bedtime        finasteride (PROSCAR) 5 MG tablet Take 1 tablet (5 mg) by mouth daily 90 tablet 1     lisinopril-hydrochlorothiazide (ZESTORETIC) 20-12.5 MG tablet Take 2 tablets by mouth daily 180 tablet 0     omeprazole (PRILOSEC) 40 MG DR capsule take 1 capsule (40mg) by mouth daily. take 30 to 60 minutes before a meal. 90 capsule 1     tamsulosin (FLOMAX) 0.4 MG capsule TAKE 1 CAPSULE (0.4MG) BY MOUTH DAILY 90 capsule 4     tiZANidine (ZANAFLEX) 4 MG tablet Take 1 tablet by mouth every bedtime for muscle tightness for 10 days then use as needed 30 tablet 3       No Known Allergies     Social History     Tobacco Use     Smoking status: Former Smoker     Quit date: 1983     Years since quittin.9     Smokeless tobacco: Never Used   Substance Use Topics     Alcohol use: Yes     Comment: occa     Family History   Problem Relation Age of Onset     Hypertension Father      Colon Cancer Father          of Colon CA     Heart Defect Father         Enlarged heart     Hypertension Brother      Hypertension Sister      Hypertension Brother      History   Drug Use No         Objective     BP (!) 142/72   Pulse 88   Temp 98.6  F (37  C) (Oral)   Resp 16   Ht 1.626 m (5' 4\")   Wt 97.5 kg (215 lb)   SpO2 96%   BMI 36.90 kg/m      Physical Exam    GENERAL APPEARANCE: obese, alert and no distress     EYES: EOMI,  PERRL     HENT: ear canals and TM's normal and nose and mouth without ulcers or lesions     NECK: no adenopathy, no asymmetry, masses, or scars and thyroid normal to palpation     RESP: lungs clear to auscultation - no rales, rhonchi or wheezes     CV: regular rates and rhythm, normal S1 S2, no S3 or S4 and no murmur, click or rub     " ABDOMEN:  soft, nontender, no HSM or masses and bowel sounds normal     MS: extremities normal- no gross deformities noted, no evidence of inflammation in joints, FROM in all extremities.     SKIN: no suspicious lesions or rashes     NEURO: Normal strength and tone, sensory exam grossly normal, mentation intact and speech normal     PSYCH: mentation appears normal. and affect normal/bright     LYMPHATICS: No cervical adenopathy    Recent Labs   Lab Test 10/15/20  1013 08/18/20  1518   HGB 13.1* 14.8    166    138   POTASSIUM 3.9 3.9   CR 1.33* 1.65*        Diagnostics:  Labs pending at this time.  Results will be reviewed when available.   EKG: appears normal, NSR, no LVH by voltage criteria, Right Bundle Branch Block, unchanged from previous tracings    Revised Cardiac Risk Index (RCRI):  The patient has the following serious cardiovascular risks for perioperative complications:   - No serious cardiac risks = 0 points     RCRI Interpretation: 0 points: Class I (very low risk - 0.4% complication rate)           Signed Electronically by: Brennan Mercado MD  Copy of this evaluation report is provided to requesting physician.

## 2021-04-28 ENCOUNTER — TRANSFERRED RECORDS (OUTPATIENT)
Dept: HEALTH INFORMATION MANAGEMENT | Facility: CLINIC | Age: 76
End: 2021-04-28

## 2021-05-14 ENCOUNTER — TRANSFERRED RECORDS (OUTPATIENT)
Dept: HEALTH INFORMATION MANAGEMENT | Facility: CLINIC | Age: 76
End: 2021-05-14

## 2021-06-29 ENCOUNTER — TRANSFERRED RECORDS (OUTPATIENT)
Dept: HEALTH INFORMATION MANAGEMENT | Facility: CLINIC | Age: 76
End: 2021-06-29

## 2021-06-29 DIAGNOSIS — G47.33 OBSTRUCTIVE SLEEP APNEA: Primary | ICD-10-CM

## 2021-07-03 ENCOUNTER — TELEPHONE (OUTPATIENT)
Dept: INTERNAL MEDICINE | Facility: CLINIC | Age: 76
End: 2021-07-03

## 2021-07-03 DIAGNOSIS — K21.9 GASTROESOPHAGEAL REFLUX DISEASE WITHOUT ESOPHAGITIS: ICD-10-CM

## 2021-07-03 DIAGNOSIS — N40.1 BENIGN NON-NODULAR PROSTATIC HYPERPLASIA WITH LOWER URINARY TRACT SYMPTOMS: ICD-10-CM

## 2021-07-03 DIAGNOSIS — I10 BENIGN ESSENTIAL HYPERTENSION: ICD-10-CM

## 2021-07-03 DIAGNOSIS — E78.5 HYPERLIPIDEMIA LDL GOAL <130: ICD-10-CM

## 2021-07-03 NOTE — LETTER
[place] [address]  [facility] [address]  [phone]        July 7, 2021      Rodney Hendrickson  [address] [facility] [address]          Dear Rodney Hendrickson    APPOINTMENT REMINDER:   Our records indicates that it is time for you to be seen for a FASTING LAB ONLY appointment to check your cholesterol.     Your current medication request will be approved for one refill but you will need to check your cholesterol before any additional refills can be approved.    Taking care of your health is important to us, and checking your cholesterol level is vital to your care.    We look forward to hearing from you in the near future.  You may call our office at [phone] to schedule a Fasting Lab Appointment.    Please disregard this notice if you have already made an appointment.      Sincerely,      [facility]

## 2021-07-06 RX ORDER — FINASTERIDE 5 MG/1
5 TABLET, FILM COATED ORAL DAILY
Qty: 90 TABLET | Refills: 2 | Status: SHIPPED | OUTPATIENT
Start: 2021-07-06 | End: 2022-03-15

## 2021-07-06 RX ORDER — ATORVASTATIN CALCIUM 40 MG/1
40 TABLET, FILM COATED ORAL DAILY
Qty: 90 TABLET | Refills: 0 | Status: SHIPPED | OUTPATIENT
Start: 2021-07-06 | End: 2021-09-28

## 2021-07-06 RX ORDER — LISINOPRIL AND HYDROCHLOROTHIAZIDE 12.5; 2 MG/1; MG/1
2 TABLET ORAL DAILY
Qty: 180 TABLET | Refills: 0 | Status: SHIPPED | OUTPATIENT
Start: 2021-07-06 | End: 2021-09-28

## 2021-07-06 RX ORDER — OMEPRAZOLE 40 MG/1
CAPSULE, DELAYED RELEASE ORAL
Qty: 90 CAPSULE | Refills: 2 | Status: SHIPPED | OUTPATIENT
Start: 2021-07-06 | End: 2022-03-15

## 2021-07-06 NOTE — TELEPHONE ENCOUNTER
Omeprazole, Finasteride:   Prescription approved per [facility] Refill Protocol.     Lisinopril-hydrochlorothiazide:   Routing refill request to provider for review/approval because:  Labs out of range:  Creatinine  BP elevated

## 2021-07-06 NOTE — TELEPHONE ENCOUNTER
Medication is being filled for 1 time refill only due to:  Patient needs labs to check cholesterol. Lab order placed for lipid panel.     Please contact patient to schedule lab only appointment to check cholesterol.

## 2021-09-26 DIAGNOSIS — E78.5 HYPERLIPIDEMIA LDL GOAL <130: ICD-10-CM

## 2021-09-26 DIAGNOSIS — I10 BENIGN ESSENTIAL HYPERTENSION: ICD-10-CM

## 2021-09-28 RX ORDER — ATORVASTATIN CALCIUM 40 MG/1
40 TABLET, FILM COATED ORAL DAILY
Qty: 90 TABLET | Refills: 0 | Status: SHIPPED | OUTPATIENT
Start: 2021-09-28 | End: 2021-12-20

## 2021-09-28 RX ORDER — LISINOPRIL AND HYDROCHLOROTHIAZIDE 12.5; 2 MG/1; MG/1
2 TABLET ORAL DAILY
Qty: 180 TABLET | Refills: 0 | Status: SHIPPED | OUTPATIENT
Start: 2021-09-28 | End: 2021-12-20

## 2021-09-28 NOTE — TELEPHONE ENCOUNTER
Routing refill request to provider for review/approval because:  Labs out of range:    BP Readings from Last 3 Encounters:   04/14/21 (!) 142/72   10/15/20 135/79   08/18/20 102/60     Creatinine   Date Value Ref Range Status   04/14/2021 1.52 (H) 0.66 - 1.25 mg/dL Final     Labs not current:  LDL  Ally Sauceda RN, BSN

## 2021-10-05 ENCOUNTER — TRANSFERRED RECORDS (OUTPATIENT)
Dept: HEALTH INFORMATION MANAGEMENT | Facility: CLINIC | Age: 76
End: 2021-10-05

## 2021-10-19 ENCOUNTER — LAB (OUTPATIENT)
Dept: LAB | Facility: CLINIC | Age: 76
End: 2021-10-19
Payer: COMMERCIAL

## 2021-10-19 DIAGNOSIS — E78.5 HYPERLIPIDEMIA LDL GOAL <130: ICD-10-CM

## 2021-10-19 LAB
CHOLEST SERPL-MCNC: 181 MG/DL
FASTING STATUS PATIENT QL REPORTED: YES
HDLC SERPL-MCNC: 48 MG/DL
LDLC SERPL CALC-MCNC: 107 MG/DL
NONHDLC SERPL-MCNC: 133 MG/DL
TRIGL SERPL-MCNC: 131 MG/DL

## 2021-10-19 PROCEDURE — 80061 LIPID PANEL: CPT

## 2021-10-19 PROCEDURE — 36415 COLL VENOUS BLD VENIPUNCTURE: CPT

## 2021-10-26 ENCOUNTER — OFFICE VISIT (OUTPATIENT)
Dept: INTERNAL MEDICINE | Facility: CLINIC | Age: 76
End: 2021-10-26
Payer: COMMERCIAL

## 2021-10-26 VITALS
BODY MASS INDEX: 37.2 KG/M2 | WEIGHT: 217.9 LBS | DIASTOLIC BLOOD PRESSURE: 68 MMHG | SYSTOLIC BLOOD PRESSURE: 128 MMHG | HEART RATE: 71 BPM | HEIGHT: 64 IN | TEMPERATURE: 97.4 F | RESPIRATION RATE: 16 BRPM | OXYGEN SATURATION: 99 %

## 2021-10-26 DIAGNOSIS — D64.9 MILD ANEMIA: ICD-10-CM

## 2021-10-26 DIAGNOSIS — R79.89 DECREASED THYROID STIMULATING HORMONE LEVEL: ICD-10-CM

## 2021-10-26 DIAGNOSIS — N28.9 FUNCTION KIDNEY DECREASED: ICD-10-CM

## 2021-10-26 DIAGNOSIS — R79.89 ABNORMAL TSH: Primary | ICD-10-CM

## 2021-10-26 DIAGNOSIS — E78.5 HYPERLIPIDEMIA LDL GOAL <130: ICD-10-CM

## 2021-10-26 DIAGNOSIS — R25.2 MUSCLE CRAMPS: ICD-10-CM

## 2021-10-26 DIAGNOSIS — N18.32 STAGE 3B CHRONIC KIDNEY DISEASE (H): ICD-10-CM

## 2021-10-26 DIAGNOSIS — Z13.29 SCREENING FOR THYROID DISORDER: ICD-10-CM

## 2021-10-26 DIAGNOSIS — E55.9 VITAMIN D DEFICIENCY: ICD-10-CM

## 2021-10-26 DIAGNOSIS — Z12.5 SCREENING FOR PROSTATE CANCER: ICD-10-CM

## 2021-10-26 DIAGNOSIS — I10 PRIMARY HYPERTENSION: ICD-10-CM

## 2021-10-26 DIAGNOSIS — N40.1 BENIGN NON-NODULAR PROSTATIC HYPERPLASIA WITH LOWER URINARY TRACT SYMPTOMS: ICD-10-CM

## 2021-10-26 LAB
ERYTHROCYTE [DISTWIDTH] IN BLOOD BY AUTOMATED COUNT: 13.7 % (ref 10–15)
HCT VFR BLD AUTO: 38.6 % (ref 40–53)
HGB BLD-MCNC: 12.6 G/DL (ref 13.3–17.7)
MCH RBC QN AUTO: 31 PG (ref 26.5–33)
MCHC RBC AUTO-ENTMCNC: 32.6 G/DL (ref 31.5–36.5)
MCV RBC AUTO: 95 FL (ref 78–100)
PLATELET # BLD AUTO: 200 10E3/UL (ref 150–450)
RBC # BLD AUTO: 4.07 10E6/UL (ref 4.4–5.9)
WBC # BLD AUTO: 5.9 10E3/UL (ref 4–11)

## 2021-10-26 PROCEDURE — 36415 COLL VENOUS BLD VENIPUNCTURE: CPT | Performed by: INTERNAL MEDICINE

## 2021-10-26 PROCEDURE — 83735 ASSAY OF MAGNESIUM: CPT | Performed by: INTERNAL MEDICINE

## 2021-10-26 PROCEDURE — 84439 ASSAY OF FREE THYROXINE: CPT | Performed by: INTERNAL MEDICINE

## 2021-10-26 PROCEDURE — 99214 OFFICE O/P EST MOD 30 MIN: CPT | Performed by: INTERNAL MEDICINE

## 2021-10-26 PROCEDURE — 80053 COMPREHEN METABOLIC PANEL: CPT | Performed by: INTERNAL MEDICINE

## 2021-10-26 PROCEDURE — 85027 COMPLETE CBC AUTOMATED: CPT | Performed by: INTERNAL MEDICINE

## 2021-10-26 PROCEDURE — 82306 VITAMIN D 25 HYDROXY: CPT | Performed by: INTERNAL MEDICINE

## 2021-10-26 PROCEDURE — 84443 ASSAY THYROID STIM HORMONE: CPT | Performed by: INTERNAL MEDICINE

## 2021-10-26 PROCEDURE — G0103 PSA SCREENING: HCPCS | Performed by: INTERNAL MEDICINE

## 2021-10-26 ASSESSMENT — MIFFLIN-ST. JEOR: SCORE: 1629.39

## 2021-10-26 NOTE — PROGRESS NOTES
"  Assessment & Plan     Screening for prostate cancer  Assess lab   - PSA, screen    Benign non-nodular prostatic hyperplasia with lower urinary tract symptoms  Cont treatment     Hyperlipidemia LDL goal <130  Controlled on statin     Stage 3b chronic kidney disease (H)  Monitor lab   - Magnesium    Primary hypertension  Controlled   - CBC with platelets  - Comprehensive metabolic panel (BMP + Alb, Alk Phos, ALT, AST, Total. Bili, TP)  - TSH with free T4 reflex    Muscle cramps  Assess lab   - Magnesium    Vitamin D deficiency  Assess lab   - Vitamin D Deficiency             BMI:   Estimated body mass index is 37.4 kg/m  as calculated from the following:    Height as of this encounter: 1.626 m (5' 4\").    Weight as of this encounter: 98.8 kg (217 lb 14.4 oz).   Weight management plan: Discussed healthy diet and exercise guidelines    See Patient Instructions      Brennan Mercado MD  [facility] LETICIA Stevens is a 76 year old who presents for the following health issues     HPI       Patient is seen for a follow up visit.  No acute complaints, no medication change or new medical conditions.  Has h/o HTN. on medical treatment. BP has been controlled. No side effects from medications. No CP, HA, dizziness. good compliance with medications and low salt diet.  Has H/O hyperlipidemia. On medical treatment and diet. No side effects. No muscle weakness, myalgias or upset stomach.   Has H/O BPH. On treatment with Flomax and Proscar . Has chronic symptoms of frequency, decreased urinary stream , nocturia, no hematuria or dysuria. No side effects from medications.  Has h/o CRF. Monitoring BP, BG, medications, avoiding OTC NSAIDs. Needs periodic recheck of kidney function.  Concern for muscle cramps in the morning in the feet.         Review of Systems   Constitutional, HEENT, cardiovascular, pulmonary, GI, , musculoskeletal, neuro, skin, endocrine and psych systems are negative, except as " "otherwise noted.      Objective    /68   Pulse 71   Temp 97.4  F (36.3  C) (Oral)   Resp 16   Ht 1.626 m (5' 4\")   Wt 98.8 kg (217 lb 14.4 oz)   SpO2 99%   BMI 37.40 kg/m    Body mass index is 37.4 kg/m .  Physical Exam   GENERAL: obese, alert and no distress  EYES: Eyes grossly normal to inspection, PERRL and conjunctivae and sclerae normal  HENT: ear canals and TM's normal, nose and mouth without ulcers or lesions  NECK: no adenopathy, no asymmetry, masses, or scars and thyroid normal to palpation  RESP: lungs clear to auscultation - no rales, rhonchi or wheezes  CV: regular rate and rhythm, normal S1 S2, no S3 or S4, no murmur, click or rub, no peripheral edema and peripheral pulses strong  ABDOMEN: soft, nontender, no hepatosplenomegaly, no masses and bowel sounds normal  MS: no gross musculoskeletal defects noted, no edema  SKIN: no suspicious lesions or rashes  NEURO: Normal strength and tone, mentation intact and speech normal    Lab on 10/19/2021   Component Date Value Ref Range Status     Cholesterol 10/19/2021 181  <200 mg/dL Final     Triglycerides 10/19/2021 131  <150 mg/dL Final     Direct Measure HDL 10/19/2021 48  >=40 mg/dL Final     LDL Cholesterol Calculated 10/19/2021 107* <=100 mg/dL Final     Non HDL Cholesterol 10/19/2021 133* <130 mg/dL Final     Patient Fasting > 8hrs? 10/19/2021 Yes   Final               "

## 2021-10-26 NOTE — NURSING NOTE
"Follow up on labs .  Vital signs:  Temp: 97.4  F (36.3  C) Temp src: Oral BP: 128/68 Pulse: 71   Resp: 16 SpO2: 99 %     Height: 162.6 cm (5' 4\") Weight: 98.8 kg (217 lb 14.4 oz)  Estimated body mass index is 37.4 kg/m  as calculated from the following:    Height as of this encounter: 1.626 m (5' 4\").    Weight as of this encounter: 98.8 kg (217 lb 14.4 oz).          "

## 2021-10-27 LAB — DEPRECATED CALCIDIOL+CALCIFEROL SERPL-MC: 46 UG/L (ref 20–75)

## 2021-10-28 LAB
ALBUMIN SERPL-MCNC: 4 G/DL (ref 3.4–5)
ALP SERPL-CCNC: 73 U/L (ref 40–150)
ALT SERPL W P-5'-P-CCNC: 30 U/L (ref 0–70)
ANION GAP SERPL CALCULATED.3IONS-SCNC: 9 MMOL/L (ref 3–14)
AST SERPL W P-5'-P-CCNC: 19 U/L (ref 0–45)
BILIRUB SERPL-MCNC: 0.3 MG/DL (ref 0.2–1.3)
BUN SERPL-MCNC: 30 MG/DL (ref 7–30)
CALCIUM SERPL-MCNC: 9.2 MG/DL (ref 8.5–10.1)
CHLORIDE BLD-SCNC: 107 MMOL/L (ref 94–109)
CO2 SERPL-SCNC: 22 MMOL/L (ref 20–32)
CREAT SERPL-MCNC: 1.53 MG/DL (ref 0.66–1.25)
GFR SERPL CREATININE-BSD FRML MDRD: 44 ML/MIN/1.73M2
GLUCOSE BLD-MCNC: 117 MG/DL (ref 70–99)
MAGNESIUM SERPL-MCNC: 2.1 MG/DL (ref 1.6–2.3)
POTASSIUM BLD-SCNC: 4.2 MMOL/L (ref 3.4–5.3)
PROT SERPL-MCNC: 7.4 G/DL (ref 6.8–8.8)
PSA SERPL-MCNC: 0.33 UG/L (ref 0–4)
SODIUM SERPL-SCNC: 138 MMOL/L (ref 133–144)
T4 FREE SERPL-MCNC: 0.87 NG/DL (ref 0.76–1.46)
TSH SERPL DL<=0.005 MIU/L-ACNC: 4.2 MU/L (ref 0.4–4)

## 2021-12-19 DIAGNOSIS — N40.1 BENIGN PROSTATIC HYPERPLASIA WITH URINARY FREQUENCY: ICD-10-CM

## 2021-12-19 DIAGNOSIS — I10 BENIGN ESSENTIAL HYPERTENSION: ICD-10-CM

## 2021-12-19 DIAGNOSIS — R35.0 BENIGN PROSTATIC HYPERPLASIA WITH URINARY FREQUENCY: ICD-10-CM

## 2021-12-19 DIAGNOSIS — E78.5 HYPERLIPIDEMIA LDL GOAL <130: ICD-10-CM

## 2021-12-20 RX ORDER — ATORVASTATIN CALCIUM 40 MG/1
40 TABLET, FILM COATED ORAL DAILY
Qty: 90 TABLET | Refills: 2 | Status: SHIPPED | OUTPATIENT
Start: 2021-12-20 | End: 2022-09-12

## 2021-12-20 RX ORDER — LISINOPRIL AND HYDROCHLOROTHIAZIDE 12.5; 2 MG/1; MG/1
2 TABLET ORAL DAILY
Qty: 180 TABLET | Refills: 0 | Status: SHIPPED | OUTPATIENT
Start: 2021-12-20 | End: 2022-03-15

## 2021-12-20 RX ORDER — TAMSULOSIN HYDROCHLORIDE 0.4 MG/1
CAPSULE ORAL
Qty: 90 CAPSULE | Refills: 2 | Status: SHIPPED | OUTPATIENT
Start: 2021-12-20 | End: 2022-09-12

## 2021-12-20 NOTE — TELEPHONE ENCOUNTER
Routing refill request to provider for review/approval because:  Labs out of range:    Creatinine   Date Value Ref Range Status   10/26/2021 1.53 (H) 0.66 - 1.25 mg/dL Final   04/14/2021 1.52 (H) 0.66 - 1.25 mg/dL Final     Ally ELENA RN, BSN

## 2021-12-20 NOTE — TELEPHONE ENCOUNTER
Prescription approved per [facility] Refill Protocol.  Ally ELENA RN, BSN

## 2022-02-17 ENCOUNTER — LAB (OUTPATIENT)
Dept: LAB | Facility: CLINIC | Age: 77
End: 2022-02-17
Payer: COMMERCIAL

## 2022-02-17 DIAGNOSIS — Z13.29 SCREENING FOR THYROID DISORDER: ICD-10-CM

## 2022-02-17 DIAGNOSIS — N28.9 FUNCTION KIDNEY DECREASED: ICD-10-CM

## 2022-02-17 DIAGNOSIS — D64.9 MILD ANEMIA: ICD-10-CM

## 2022-02-17 LAB
ERYTHROCYTE [DISTWIDTH] IN BLOOD BY AUTOMATED COUNT: 13.2 % (ref 10–15)
HCT VFR BLD AUTO: 40.8 % (ref 40–53)
HGB BLD-MCNC: 13.4 G/DL (ref 13.3–17.7)
MCH RBC QN AUTO: 31.5 PG (ref 26.5–33)
MCHC RBC AUTO-ENTMCNC: 32.8 G/DL (ref 31.5–36.5)
MCV RBC AUTO: 96 FL (ref 78–100)
PLATELET # BLD AUTO: 192 10E3/UL (ref 150–450)
RBC # BLD AUTO: 4.26 10E6/UL (ref 4.4–5.9)
WBC # BLD AUTO: 6.7 10E3/UL (ref 4–11)

## 2022-02-17 PROCEDURE — 36415 COLL VENOUS BLD VENIPUNCTURE: CPT

## 2022-02-17 PROCEDURE — 85027 COMPLETE CBC AUTOMATED: CPT

## 2022-02-17 PROCEDURE — 84443 ASSAY THYROID STIM HORMONE: CPT

## 2022-02-17 PROCEDURE — 80048 BASIC METABOLIC PNL TOTAL CA: CPT

## 2022-02-18 LAB
ANION GAP SERPL CALCULATED.3IONS-SCNC: 8 MMOL/L (ref 3–14)
BUN SERPL-MCNC: 33 MG/DL (ref 7–30)
CALCIUM SERPL-MCNC: 9.8 MG/DL (ref 8.5–10.1)
CHLORIDE BLD-SCNC: 105 MMOL/L (ref 94–109)
CO2 SERPL-SCNC: 26 MMOL/L (ref 20–32)
CREAT SERPL-MCNC: 1.63 MG/DL (ref 0.66–1.25)
GFR SERPL CREATININE-BSD FRML MDRD: 43 ML/MIN/1.73M2
GLUCOSE BLD-MCNC: 133 MG/DL (ref 70–99)
POTASSIUM BLD-SCNC: 4.3 MMOL/L (ref 3.4–5.3)
SODIUM SERPL-SCNC: 139 MMOL/L (ref 133–144)
TSH SERPL DL<=0.005 MIU/L-ACNC: 3.63 MU/L (ref 0.4–4)

## 2022-02-21 ENCOUNTER — OFFICE VISIT (OUTPATIENT)
Dept: INTERNAL MEDICINE | Facility: CLINIC | Age: 77
End: 2022-02-21
Payer: COMMERCIAL

## 2022-02-21 VITALS
SYSTOLIC BLOOD PRESSURE: 138 MMHG | RESPIRATION RATE: 14 BRPM | TEMPERATURE: 97.1 F | HEART RATE: 78 BPM | DIASTOLIC BLOOD PRESSURE: 72 MMHG | HEIGHT: 64 IN | OXYGEN SATURATION: 99 % | WEIGHT: 214.4 LBS | BODY MASS INDEX: 36.6 KG/M2

## 2022-02-21 DIAGNOSIS — Z11.59 NEED FOR HEPATITIS C SCREENING TEST: ICD-10-CM

## 2022-02-21 DIAGNOSIS — R25.2 MUSCLE CRAMPS: ICD-10-CM

## 2022-02-21 DIAGNOSIS — E78.5 HYPERLIPIDEMIA LDL GOAL <130: ICD-10-CM

## 2022-02-21 DIAGNOSIS — N18.32 STAGE 3B CHRONIC KIDNEY DISEASE (H): ICD-10-CM

## 2022-02-21 DIAGNOSIS — E66.01 MORBID OBESITY DUE TO EXCESS CALORIES (H): ICD-10-CM

## 2022-02-21 DIAGNOSIS — G47.33 OSA (OBSTRUCTIVE SLEEP APNEA): ICD-10-CM

## 2022-02-21 DIAGNOSIS — I10 PRIMARY HYPERTENSION: ICD-10-CM

## 2022-02-21 DIAGNOSIS — Z00.00 ENCOUNTER FOR MEDICARE ANNUAL WELLNESS EXAM: Primary | ICD-10-CM

## 2022-02-21 PROCEDURE — 99213 OFFICE O/P EST LOW 20 MIN: CPT | Mod: 25 | Performed by: INTERNAL MEDICINE

## 2022-02-21 PROCEDURE — 99397 PER PM REEVAL EST PAT 65+ YR: CPT | Performed by: INTERNAL MEDICINE

## 2022-02-21 ASSESSMENT — ENCOUNTER SYMPTOMS
CONSTIPATION: 0
HEMATURIA: 0
ABDOMINAL PAIN: 0
COUGH: 0
DIARRHEA: 0
CHILLS: 0
HEMATOCHEZIA: 0

## 2022-02-21 ASSESSMENT — ACTIVITIES OF DAILY LIVING (ADL): CURRENT_FUNCTION: NO ASSISTANCE NEEDED

## 2022-02-21 NOTE — PROGRESS NOTES
"SUBJECTIVE:   Rodney Hendrickson is a 76 year old male who presents for Preventive Visit.      Patient has been advised of split billing requirements and indicates understanding: Yes  Are you in the first 12 months of your Medicare coverage?  No    Healthy Habits:     In general, how would you rate your overall health?  Good    Frequency of exercise:  2-3 days/week    Duration of exercise:  30-45 minutes    Do you usually eat at least 4 servings of fruit and vegetables a day, include whole grains    & fiber and avoid regularly eating high fat or \"junk\" foods?  Yes    Taking medications regularly:  Yes    Medication side effects:  None    Ability to successfully perform activities of daily living:  No assistance needed    Home Safety:  No safety concerns identified    Hearing Impairment:  No hearing concerns    In the past 6 months, have you been bothered by leaking of urine?  No    In general, how would you rate your overall mental or emotional health?  Good      PHQ-2 Total Score: 0    Additional concerns today:  No    Do you feel safe in your environment? Yes    Have you ever done Advance Care Planning? (For example, a Health Directive, POLST, or a discussion with a medical provider or your loved ones about your wishes): Yes, patient states has an Advance Care Planning document and will bring a copy to the clinic.       Fall risk  Fallen 2 or more times in the past year?: No  Any fall with injury in the past year?: No    Cognitive Screening   1) Repeat 3 items (Leader, Season, Table)    2) Clock draw: NORMAL  3) 3 item recall: Recalls 3 objects  Results: 3 items recalled: COGNITIVE IMPAIRMENT LESS LIKELY    Mini-CogTM Copyright [employer]. Licensed by the author for use in [facility]; reprinted with permission (amando@.[facility]). All rights reserved.      Do you have sleep apnea, excessive snoring or daytime drowsiness?: yes    Reviewed and updated as needed this visit by clinical staff   Tobacco  Allergies  " Meds  Problems  Med Hx  Surg Hx  Fam Hx  Soc   Hx        Reviewed and updated as needed this visit by Provider                 Social History     Tobacco Use     Smoking status: Former Smoker     Quit date: 1983     Years since quittin.8     Smokeless tobacco: Never Used   Substance Use Topics     Alcohol use: Yes     Comment: occa     If you drink alcohol do you typically have >3 drinks per day or >7 drinks per week? No    Alcohol Use 2022   Prescreen: >3 drinks/day or >7 drinks/week? No   Prescreen: >3 drinks/day or >7 drinks/week? -   No flowsheet data found.        PROBLEMS TO ADD ON...  Has h/o HTN. on medical treatment. BP has been controlled. No side effects from medications. No CP, HA, dizziness. good compliance with medications and low salt diet.  Has H/O hyperlipidemia. On medical treatment and diet. No side effects. No muscle weakness, myalgias or upset stomach.   Has h/o CRF. Monitoring BP, BG, medications, avoiding OTC NSAIDs. Needs periodic recheck of kidney function.  Has h/o DOMINIK, uses CPAP, good results. Improved daytime fatigue.     Current providers sharing in care for this patient include:   Patient Care Team:  Brennan Mercado MD as PCP - General (Internal Medicine)  Brennan Mercado MD as Assigned PCP    The following health maintenance items are reviewed in Epic and correct as of today:  Health Maintenance Due   Topic Date Due     HEPATITIS C SCREENING  Never done     ZOSTER IMMUNIZATION (1 of 2) Never done     MICROALBUMIN  2020     Lab work is in process  Labs reviewed in EPIC          Review of Systems   Constitutional: Negative for chills.   HENT: Negative for congestion.    Respiratory: Negative for cough.    Gastrointestinal: Negative for abdominal pain, constipation, diarrhea and hematochezia.   Genitourinary: Negative for hematuria.         OBJECTIVE:   /72 (BP Location: Left arm, Patient Position: Sitting, Cuff Size: Adult Large)   Pulse 78    "Temp 97.1  F (36.2  C) (Tympanic)   Resp 14   Ht 1.626 m (5' 4\")   Wt 97.3 kg (214 lb 6.4 oz)   SpO2 99%   BMI 36.80 kg/m   Estimated body mass index is 36.8 kg/m  as calculated from the following:    Height as of this encounter: 1.626 m (5' 4\").    Weight as of this encounter: 97.3 kg (214 lb 6.4 oz).  Physical Exam  GENERAL: healthy, alert and no distress  EYES: Eyes grossly normal to inspection, PERRL and conjunctivae and sclerae normal  HENT: ear canals and TM's normal, nose and mouth without ulcers or lesions  NECK: no adenopathy, no asymmetry, masses, or scars and thyroid normal to palpation  RESP: lungs clear to auscultation - no rales, rhonchi or wheezes  CV: regular rate and rhythm, normal S1 S2, no S3 or S4, no murmur, click or rub, no peripheral edema and peripheral pulses strong  ABDOMEN: soft, nontender, no hepatosplenomegaly, no masses and bowel sounds normal  MS: no gross musculoskeletal defects noted, no edema  SKIN: no suspicious lesions or rashes  NEURO: Normal strength and tone, mentation intact and speech normal  PSYCH: mentation appears normal, affect normal/bright    Diagnostic Test Results:  Labs reviewed in Epic    ASSESSMENT / PLAN:   (Z00.00) Encounter for Medicare annual wellness exam  (primary encounter diagnosis)  Comment: assess lab in 3 months  Plan: **Basic metabolic panel FUTURE 2mo        advised regular aerobic activity, low cholesterol, low salt diet, wearing seat belt,  self examinations, sunscreen protection.Obtain screening cholesterol, immunizations reviewed.    (R25.2) Muscle cramps  Comment: cont PRN Zanaflex. Tylenol. Avoid NSAID   Plan: tiZANidine (ZANAFLEX) 4 MG tablet            (N18.32) Stage 3b chronic kidney disease (H)  Comment:   Plan: **Basic metabolic panel FUTURE 2mo            (I10) Primary hypertension  Comment: controlled  Plan: cont treatment     (E78.5) Hyperlipidemia LDL goal <130  Comment: cont tretment      (G47.33) DOMINIK (obstructive sleep " "apnea)  Comment: on CPAP      (E66.01) Morbid obesity due to excess calories (H)  Comment: weight loss advised     Patient has been advised of split billing requirements and indicates understanding: Yes    COUNSELING:  Reviewed preventive health counseling, as reflected in patient instructions       Regular exercise       Healthy diet/nutrition       Vision screening       Hearing screening       Colon cancer screening       Prostate cancer screening    Estimated body mass index is 36.8 kg/m  as calculated from the following:    Height as of this encounter: 1.626 m (5' 4\").    Weight as of this encounter: 97.3 kg (214 lb 6.4 oz).    Weight management plan: Discussed healthy diet and exercise guidelines    He reports that he quit smoking about 38 years ago. He has never used smokeless tobacco.      Appropriate preventive services were discussed with this patient, including applicable screening as appropriate for cardiovascular disease, diabetes, osteopenia/osteoporosis, and glaucoma.  As appropriate for age/gender, discussed screening for colorectal cancer, prostate cancer, breast cancer, and cervical cancer. Checklist reviewing preventive services available has been given to the patient.    Reviewed patients plan of care and provided an AVS. The Intermediate Care Plan ( asthma action plan, low back pain action plan, and migraine action plan) for Rodney meets the Care Plan requirement. This Care Plan has been established and reviewed with the Patient.    Counseling Resources:  ATP IV Guidelines  Pooled Cohorts Equation Calculator  Breast Cancer Risk Calculator  Breast Cancer: Medication to Reduce Risk  FRAX Risk Assessment  ICSI Preventive Guidelines  Dietary Guidelines for Americans, 2010  "[employer]'s MyPlate  ASA Prophylaxis  Lung CA Screening    Brennan Mercado MD  [facility]    Identified Health Risks:  "

## 2022-02-21 NOTE — PATIENT INSTRUCTIONS
Patient Education   Personalized Prevention Plan  You are due for the preventive services outlined below.  Your care team is available to assist you in scheduling these services.  If you have already completed any of these items, please share that information with your care team to update in your medical record.  Health Maintenance Due   Topic Date Due     ANNUAL REVIEW OF HM ORDERS  Never done     Hepatitis C Screening  Never done     Zoster (Shingles) Vaccine (1 of 2) Never done     Kidney Microalbumin Urine Test  06/17/2020     Annual Wellness Visit  08/18/2021

## 2022-03-13 DIAGNOSIS — I10 BENIGN ESSENTIAL HYPERTENSION: ICD-10-CM

## 2022-03-13 DIAGNOSIS — K21.9 GASTROESOPHAGEAL REFLUX DISEASE WITHOUT ESOPHAGITIS: ICD-10-CM

## 2022-03-13 DIAGNOSIS — N40.1 BENIGN NON-NODULAR PROSTATIC HYPERPLASIA WITH LOWER URINARY TRACT SYMPTOMS: ICD-10-CM

## 2022-03-15 RX ORDER — FINASTERIDE 5 MG/1
5 TABLET, FILM COATED ORAL DAILY
Qty: 90 TABLET | Refills: 3 | Status: SHIPPED | OUTPATIENT
Start: 2022-03-15 | End: 2023-03-21

## 2022-03-15 RX ORDER — OMEPRAZOLE 40 MG/1
CAPSULE, DELAYED RELEASE ORAL
Qty: 90 CAPSULE | Refills: 3 | Status: SHIPPED | OUTPATIENT
Start: 2022-03-15 | End: 2023-03-21

## 2022-03-15 RX ORDER — LISINOPRIL AND HYDROCHLOROTHIAZIDE 12.5; 2 MG/1; MG/1
2 TABLET ORAL DAILY
Qty: 180 TABLET | Refills: 0 | Status: SHIPPED | OUTPATIENT
Start: 2022-03-15 | End: 2022-06-14

## 2022-03-15 NOTE — TELEPHONE ENCOUNTER
Routing refill request to provider for review/approval because:  BP Readings from Last 3 Encounters:   02/21/22 138/72   10/26/21 128/68   04/14/21 (!) 142/72      Creatinine   Date Value Ref Range Status   02/17/2022 1.63 (H) 0.66 - 1.25 mg/dL Final   04/14/2021 1.52 (H) 0.66 - 1.25 mg/dL Final        Rebecca Magaña RN

## 2022-03-15 NOTE — TELEPHONE ENCOUNTER
Prescription approved per [facility] Refill Protocol.  Rebecca Magaña RN

## 2022-05-07 NOTE — TELEPHONE ENCOUNTER
"Requested Prescriptions   Pending Prescriptions Disp Refills     lisinopril-hydrochlorothiazide (PRINZIDE/ZESTORETIC) 20-12.5 MG tablet [Pharmacy Med Name: Lisinopril-hydroCHLOROthiazide Oral Tablet 20-12.5 MG] 90 tablet 0     Sig: take 1 tablet by mouth daily. due for appt/labs. needs to call doctor's office.    Diuretics (Including Combos) Protocol Failed - 2/14/2019  7:00 AM       Failed - Blood pressure under 140/90 in past 12 months    BP Readings from Last 3 Encounters:   01/25/19 150/76   06/26/18 118/82   06/06/18 148/84                Failed - Normal serum creatinine on file in past 12 months    Recent Labs   Lab Test 06/26/18  0752   CR 1.34*             Passed - Recent (12 mo) or future (30 days) visit within the authorizing provider's specialty    Patient had office visit in the last 12 months or has a visit in the next 30 days with authorizing provider or within the authorizing provider's specialty.  See \"Patient Info\" tab in inbasket, or \"Choose Columns\" in Meds & Orders section of the refill encounter.             Passed - Medication is active on med list       Passed - Patient is age 18 or older       Passed - Normal serum potassium on file in past 12 months    Recent Labs   Lab Test 06/26/18  0752   POTASSIUM 4.0                   Passed - Normal serum sodium on file in past 12 months    Recent Labs   Lab Test 06/26/18  0752                 lisinopril-hydrochlorothiazide (PRINZIDE/ZESTORETIC) 20-12.5 MG per tablet 90 tablet 0 11/14/2018        Last Written Prescription Date:  11/14/2018  Last Fill Quantity: 90,  # refills: 0   Last office visit: 6/26/2018 with prescribing provider:  Dr. Mendoza   Future Office Visit:  Unknown     " · Patient with complaints of urinary incontinence   · Garcia placed due to significant retention and to remain upon discharge and outpatient follow up with urology  · Continue flomax upon discharge   · Monitor output post procedurally    · Patient voiding appropriately after Garcia catheter removal

## 2022-06-10 DIAGNOSIS — I10 BENIGN ESSENTIAL HYPERTENSION: ICD-10-CM

## 2022-06-14 RX ORDER — LISINOPRIL AND HYDROCHLOROTHIAZIDE 12.5; 2 MG/1; MG/1
2 TABLET ORAL DAILY
Qty: 180 TABLET | Refills: 0 | Status: SHIPPED | OUTPATIENT
Start: 2022-06-14 | End: 2022-09-21

## 2022-06-14 NOTE — TELEPHONE ENCOUNTER
Pending Prescriptions:                       Disp   Refills    lisinopril-hydrochlorothiazide (ZESTORETIC*180 ta*0        Sig: Take 2 tablets by mouth daily    Routing refill request to provider for review/approval because:  Needs provider review

## 2022-06-28 DIAGNOSIS — G47.33 OBSTRUCTIVE SLEEP APNEA (ADULT) (PEDIATRIC): Primary | ICD-10-CM

## 2022-07-15 ENCOUNTER — LAB (OUTPATIENT)
Dept: LAB | Facility: CLINIC | Age: 77
End: 2022-07-15
Payer: COMMERCIAL

## 2022-07-15 DIAGNOSIS — N18.32 STAGE 3B CHRONIC KIDNEY DISEASE (H): ICD-10-CM

## 2022-07-15 DIAGNOSIS — Z00.00 ENCOUNTER FOR MEDICARE ANNUAL WELLNESS EXAM: ICD-10-CM

## 2022-07-15 PROCEDURE — 80048 BASIC METABOLIC PNL TOTAL CA: CPT

## 2022-07-15 PROCEDURE — 36415 COLL VENOUS BLD VENIPUNCTURE: CPT

## 2022-07-16 LAB
ANION GAP SERPL CALCULATED.3IONS-SCNC: 4 MMOL/L (ref 3–14)
BUN SERPL-MCNC: 33 MG/DL (ref 7–30)
CALCIUM SERPL-MCNC: 9.4 MG/DL (ref 8.5–10.1)
CHLORIDE BLD-SCNC: 105 MMOL/L (ref 94–109)
CO2 SERPL-SCNC: 29 MMOL/L (ref 20–32)
CREAT SERPL-MCNC: 1.68 MG/DL (ref 0.66–1.25)
GFR SERPL CREATININE-BSD FRML MDRD: 42 ML/MIN/1.73M2
GLUCOSE BLD-MCNC: 132 MG/DL (ref 70–99)
POTASSIUM BLD-SCNC: 4.2 MMOL/L (ref 3.4–5.3)
SODIUM SERPL-SCNC: 138 MMOL/L (ref 133–144)

## 2022-09-09 DIAGNOSIS — E78.5 HYPERLIPIDEMIA LDL GOAL <130: ICD-10-CM

## 2022-09-09 DIAGNOSIS — N40.1 BENIGN PROSTATIC HYPERPLASIA WITH URINARY FREQUENCY: ICD-10-CM

## 2022-09-09 DIAGNOSIS — R35.0 BENIGN PROSTATIC HYPERPLASIA WITH URINARY FREQUENCY: ICD-10-CM

## 2022-09-09 NOTE — LETTER
[facility]  [address], [address]  [place], [place]  [address]                                            TEL:[phone]  FAX:[phone]      Rodney Hendrickson  [address] [address]      September 14, 2022    Dear Rodney       We have received a refill request from your pharmacy for your medication - atorvastatin. Many medications require routine follow-up with your provider and a review of your chart indicates that you are due for fasting lab work. We have sent a one time, 90 day refill to your pharmacy to allow you time to schedule an appointment.     Please call [phone] to schedule an appointment.  We look forward to seeing you in the near future.      Thank you,     [facility]

## 2022-09-12 RX ORDER — ATORVASTATIN CALCIUM 40 MG/1
40 TABLET, FILM COATED ORAL DAILY
Qty: 90 TABLET | Refills: 0 | Status: SHIPPED | OUTPATIENT
Start: 2022-09-12 | End: 2022-12-20

## 2022-09-12 RX ORDER — TAMSULOSIN HYDROCHLORIDE 0.4 MG/1
CAPSULE ORAL
Qty: 90 CAPSULE | Refills: 1 | Status: SHIPPED | OUTPATIENT
Start: 2022-09-12 | End: 2023-03-21

## 2022-09-12 NOTE — TELEPHONE ENCOUNTER
Pending Prescriptions:                       Disp   Refills    tamsulosin (FLOMAX) 0.4 MG capsule [Pharm*90 cap*0            Sig: TAKE 1 CAPSULE (0.4MG) BY MOUTH DAILY      Prescription approved per [facility] Refill Protocol.

## 2022-09-12 NOTE — TELEPHONE ENCOUNTER
Pending Prescriptions:                       Disp   Refills    atorvastatin (LIPITOR) 40 MG tablet [Phar*90 tab*0            Sig: Take 1 tablet (40 mg) by mouth daily      Medication is being filled for 1 time refill only due to:  Patient needs labs in Oct 2022.     Routed to primary care provider to place any future lab orders needed.    Then need to call patient and schedule a lab only appointment.    Please advise, thanks.

## 2022-09-21 DIAGNOSIS — I10 BENIGN ESSENTIAL HYPERTENSION: ICD-10-CM

## 2022-09-21 RX ORDER — LISINOPRIL AND HYDROCHLOROTHIAZIDE 12.5; 2 MG/1; MG/1
2 TABLET ORAL DAILY
Qty: 180 TABLET | Refills: 1 | Status: SHIPPED | OUTPATIENT
Start: 2022-09-21 | End: 2023-03-21

## 2022-09-21 NOTE — TELEPHONE ENCOUNTER
Provider approval needed.     Creatinine   Date Value Ref Range Status   07/15/2022 1.68 (H) 0.66 - 1.25 mg/dL Final   04/14/2021 1.52 (H) 0.66 - 1.25 mg/dL Final

## 2022-11-30 ENCOUNTER — LAB (OUTPATIENT)
Dept: LAB | Facility: CLINIC | Age: 77
End: 2022-11-30
Payer: COMMERCIAL

## 2022-11-30 ENCOUNTER — ALLIED HEALTH/NURSE VISIT (OUTPATIENT)
Dept: INTERNAL MEDICINE | Facility: CLINIC | Age: 77
End: 2022-11-30
Payer: COMMERCIAL

## 2022-11-30 VITALS — SYSTOLIC BLOOD PRESSURE: 138 MMHG | DIASTOLIC BLOOD PRESSURE: 70 MMHG

## 2022-11-30 DIAGNOSIS — Z11.59 NEED FOR HEPATITIS C SCREENING TEST: ICD-10-CM

## 2022-11-30 DIAGNOSIS — N18.30 CKD (CHRONIC KIDNEY DISEASE) STAGE 3, GFR 30-59 ML/MIN (H): ICD-10-CM

## 2022-11-30 DIAGNOSIS — E78.5 HYPERLIPIDEMIA LDL GOAL <130: ICD-10-CM

## 2022-11-30 DIAGNOSIS — Z01.30 BLOOD PRESSURE CHECK: Primary | ICD-10-CM

## 2022-11-30 LAB
CHOLEST SERPL-MCNC: 192 MG/DL
CREAT UR-MCNC: 91.1 MG/DL
HDLC SERPL-MCNC: 42 MG/DL
HGB BLD-MCNC: 13.4 G/DL (ref 13.3–17.7)
LDLC SERPL CALC-MCNC: 105 MG/DL
MICROALBUMIN UR-MCNC: 22.7 MG/L
MICROALBUMIN/CREAT UR: 24.92 MG/G CR (ref 0–17)
NONHDLC SERPL-MCNC: 150 MG/DL
TRIGL SERPL-MCNC: 225 MG/DL

## 2022-11-30 PROCEDURE — 80061 LIPID PANEL: CPT

## 2022-11-30 PROCEDURE — 85018 HEMOGLOBIN: CPT

## 2022-11-30 PROCEDURE — 99207 PR NO CHARGE NURSE ONLY: CPT | Performed by: INTERNAL MEDICINE

## 2022-11-30 PROCEDURE — 82043 UR ALBUMIN QUANTITATIVE: CPT

## 2022-11-30 PROCEDURE — 36415 COLL VENOUS BLD VENIPUNCTURE: CPT

## 2022-11-30 PROCEDURE — 86803 HEPATITIS C AB TEST: CPT

## 2022-11-30 NOTE — PROGRESS NOTES
Rodney Hendrickson was evaluated at [facility] on November 30, 2022 at which time his blood pressure was:    BP Readings from Last 3 Encounters:   11/30/22 138/70   02/21/22 138/72   10/26/21 128/68     Pulse Readings from Last 3 Encounters:   02/21/22 78   10/26/21 71   04/14/21 88       Reviewed lifestyle modifications for blood pressure control and reduction: including making healthy food choices, managing weight, getting regular exercise, smoking cessation, reducing alcohol consumption, monitoring blood pressure regularly.     Symptoms: None    BP Goal:< 140/90 mmHg    BP Assessment:  BP at goal    Potential Reasons for BP too high: NA - Not applicable    BP Follow-Up Plan: Recheck BP in 6 months at pharmacy    Recommendation to Provider: Continue current HTN medication regimen with regular BP monitoring, once every 6 months in pharmacy if at goal, or sooner if patient is unwell or at home monitoring is abnormal.       Note completed by: Rosemary Escobar PharmD    [facility]  Phone:  [phone]  Fax:  [phone]

## 2022-12-01 LAB — HCV AB SERPL QL IA: NONREACTIVE

## 2022-12-12 ENCOUNTER — VIRTUAL VISIT (OUTPATIENT)
Dept: INTERNAL MEDICINE | Facility: CLINIC | Age: 77
End: 2022-12-12
Payer: COMMERCIAL

## 2022-12-12 DIAGNOSIS — M1A.0790 CHRONIC IDIOPATHIC GOUT INVOLVING TOE WITHOUT TOPHUS, UNSPECIFIED LATERALITY: Primary | ICD-10-CM

## 2022-12-12 PROCEDURE — 99213 OFFICE O/P EST LOW 20 MIN: CPT | Mod: 95 | Performed by: INTERNAL MEDICINE

## 2022-12-12 RX ORDER — ALLOPURINOL 100 MG/1
100 TABLET ORAL DAILY
Qty: 90 TABLET | Refills: 3 | Status: SHIPPED | OUTPATIENT
Start: 2022-12-12 | End: 2023-12-04

## 2022-12-12 RX ORDER — COLCHICINE 0.6 MG/1
0.6 TABLET ORAL DAILY
Qty: 5 TABLET | Refills: 3 | Status: SHIPPED | OUTPATIENT
Start: 2022-12-12 | End: 2023-07-25

## 2022-12-12 NOTE — PROGRESS NOTES
"Rodney is a 77 year old who is being evaluated via a billable telephone visit.      What phone number would you like to be contacted at? [phone]  How would you like to obtain your AVS? Mail a copy  Distant Location (provider location):  On-site    Assessment & Plan     Chronic idiopathic gout involving toe without tophus, unspecified laterality  Has had 3 gout attacks past year,   Will start on Allopurinol and prevention and Colchicine for treatment of acute flares.   Discussed diet.   - allopurinol (ZYLOPRIM) 100 MG tablet; Take 1 tablet (100 mg) by mouth daily  - colchicine (COLCYRS) 0.6 MG tablet; Take 1 tablet (0.6 mg) by mouth daily For gout flare up             BMI:   Estimated body mass index is 36.8 kg/m  as calculated from the following:    Height as of 2/21/22: 1.626 m (5' 4\").    Weight as of 2/21/22: 97.3 kg (214 lb 6.4 oz).       See Patient Instructions    Return in about 2 months (around 2/12/2023) for Physical Exam.    Brennan Mercado MD  [facility]    Ellie Stevens is a 77 year old, presenting for the following health issues:  No chief complaint on file.      HPI     Left great toe is red , swallen and painful   History of gout      Patient has had 3 episodes of toes pain, swelling, redness, has elevated uric acid levels. Symptoms consistent with gout, podagra. [  Seen in UC and treated with course of Prednisone, now pain has resolved. Affected the left great toe, prior to that the right toe.   No fever, chills.   Has HTN, controlled on treatment.     Review of Systems   Constitutional, HEENT, cardiovascular, pulmonary, gi and gu systems are negative, except as otherwise noted.      Objective           Vitals:  No vitals were obtained today due to virtual visit.    Physical Exam   healthy, alert and no distress  PSYCH: Alert and oriented times 3; coherent speech, normal   rate and volume, able to articulate logical thoughts, able   to abstract reason, no " tangential thoughts, no hallucinations   or delusions  His affect is normal  RESP: No cough, no audible wheezing, able to talk in full sentences  Remainder of exam unable to be completed due to telephone visits    Lab on 11/30/2022   Component Date Value Ref Range Status     Cholesterol 11/30/2022 192  <200 mg/dL Final     Triglycerides 11/30/2022 225 (H)  <150 mg/dL Final     Direct Measure HDL 11/30/2022 42  >=40 mg/dL Final     LDL Cholesterol Calculated 11/30/2022 105 (H)  <=100 mg/dL Final     Non HDL Cholesterol 11/30/2022 150 (H)  <130 mg/dL Final     Hepatitis C Antibody 11/30/2022 Nonreactive  Nonreactive Final     Albumin Urine mg/L 11/30/2022 22.7  mg/L Final    The reference ranges have not been established in urine albumin. The results should be integrated into the clinical context for interpretation.     Albumin Urine mg/g Cr 11/30/2022 24.92 (H)  0.00 - 17.00 mg/g Cr Final    Microalbuminuria is defined as an albumin:creatinine ratio of 17 to 299 for males and 25 to 299 for females. A ratio of albumin:creatinine of 300 or higher is indicative of overt proteinuria.  Due to biologic variability, positive results should be confirmed by a second, first-morning random or 24-hour timed urine specimen. If there is discrepancy, a third specimen is recommended. When 2 out of 3 results are in the microalbuminuria range, this is evidence for incipient nephropathy and warrants increased efforts at glucose control, blood pressure control, and institution of therapy with an angiotensin-converting-enzyme (ACE) inhibitor (if the patient can tolerate it).       Creatinine Urine mg/dL 11/30/2022 91.1  mg/dL Final    The reference ranges have not been established in urine creatinine. The results should be integrated into the clinical context for interpretation.     Hemoglobin 11/30/2022 13.4  13.3 - 17.7 g/dL Final               Phone call duration: 5 minutes

## 2022-12-17 DIAGNOSIS — E78.5 HYPERLIPIDEMIA LDL GOAL <130: ICD-10-CM

## 2022-12-20 RX ORDER — ATORVASTATIN CALCIUM 40 MG/1
40 TABLET, FILM COATED ORAL DAILY
Qty: 90 TABLET | Refills: 0 | Status: SHIPPED | OUTPATIENT
Start: 2022-12-20 | End: 2023-03-21

## 2022-12-20 NOTE — TELEPHONE ENCOUNTER
Routing refill request to provider for review/approval because:  Drug interaction warning      Rebecca Magaña, RN

## 2023-01-11 DIAGNOSIS — K22.70 BARRETT'S ESOPHAGUS WITHOUT DYSPLASIA: Primary | ICD-10-CM

## 2023-01-13 ENCOUNTER — TELEPHONE (OUTPATIENT)
Dept: GASTROENTEROLOGY | Facility: CLINIC | Age: 78
End: 2023-01-13

## 2023-01-13 DIAGNOSIS — K22.70 BARRETT'S ESOPHAGUS WITHOUT DYSPLASIA: Primary | ICD-10-CM

## 2023-01-13 NOTE — TELEPHONE ENCOUNTER
"    Screening Questions  BLUE  KIND OF PREP RED  LOCATION [review exclusion criteria] GREEN  SEDATION TYPE        Y Are you active on mychart?       KROMHOUT Ordering/Referring Provider?        MEDICA What type of coverage do you have?      N Have you had a positive covid test in the last 14 days?     36.9 1. BMI  [BMI 40+ - review exclusion criteria]    Y  2. Are you able to give consent for your medical care? [IF NO,RN REVIEW]          N  3. Are you taking any prescription pain medications on a routine schedule   (ex narcotics: tramadol, oxycodone, roxicodone, oxycontin,  and percocet)?          3a. EXTENDED PREP What kind of prescription?     N 4. Do you have any chemical dependencies such as alcohol, street drugs, or methadone?        **If yes 3- 5 , please schedule with MAC sedation.**          IF YES TO ANY 6 - 10 - HOSPITAL SETTING ONLY.     N 6.   Do you need assistance transferring?     N 7.   Have you had a heart or lung transplant?    N 8.   Are you currently on dialysis?   N 9.   Do you use daily home oxygen?   N 10. Do you take nitroglycerin?   10a.  If yes, how often?     11. [FEMALES]   Are you currently pregnant?    11a.  If yes, how many weeks? [ Greater than 12 weeks, OR NEEDED]    N 12. Do you have Pulmonary Hypertension? *NEED PAC APPT AT [facility]*     N 13. [review exclusion criteria]  Do you have any implantable devices in your body (pacemaker, defib, LVAD)?    N 14. In the past 6 months, have you had any heart related issues including cardiomyopathy or heart attack?     14a.  If yes, did it require cardiac stenting if so when?     N 15. Have you had a stroke or Transient ischemic attack (TIA - aka  mini stroke ) within 6 months?      Y 16. Do you have mod to severe Obstructive Sleep Apnea?  [Hospital only]    N 17. Do you have SEVERE AND UNCONTROLLED asthma? *NEED PAC APPT AT [facility]*     N 18. Are you currently taking any blood thinners?     18a. If yes, inform patient to \"follow up w/ ordering " "provider for bridging instructions.\"    N 19. Do you take the medication Phentermine?    19a. If yes, \"Hold for 7 days before procedure.  Please consult your prescribing provider if you have questions about holding this medication.\"     N  20. Do you have chronic kidney disease?      N  21. Do you have a diagnosis of diabetes?      23. Preferred LOCAL Pharmacy for Pre Prescription    [ LIST ONLY ONE PHARMACY]     [id number] - [place], [place] - [address]        - CLOSING REMINDERS -    Informed patient they will need an adult    Cannot take any type of public or medical transportation alone    Conscious Sedation- Needs  for 6 hours after the procedure       MAC/General-Needs  for 24 hours after procedure    Pre-Procedure Covid test to be completed [ESSC PCR Testing Required]    Confirmed Nurse will call to complete assessment       - SCHEDULING DETAILS -  Y Hospital Setting Required? If yes, what is the exclusion?: DOMINIK HOANG  Surgeon    2/14/2023  Date of Procedure  Upper Endoscopy [EGD]  Type of Procedure Scheduled  [facility] Location   MODERATE Sedation Type     NO PAC / Pre-op Required                 "

## 2023-02-02 ENCOUNTER — DOCUMENTATION ONLY (OUTPATIENT)
Dept: LAB | Facility: CLINIC | Age: 78
End: 2023-02-02
Payer: COMMERCIAL

## 2023-02-13 ENCOUNTER — HOSPITAL ENCOUNTER (OUTPATIENT)
Facility: CLINIC | Age: 78
Discharge: HOME | End: 2023-02-13
Attending: INTERNAL MEDICINE | Admitting: INTERNAL MEDICINE
Payer: COMMERCIAL

## 2023-02-13 VITALS
OXYGEN SATURATION: 100 % | SYSTOLIC BLOOD PRESSURE: 120 MMHG | HEART RATE: 59 BPM | DIASTOLIC BLOOD PRESSURE: 63 MMHG | RESPIRATION RATE: 16 BRPM

## 2023-02-13 LAB — UPPER GI ENDOSCOPY: NORMAL

## 2023-02-13 PROCEDURE — G0500 MOD SEDAT ENDO SERVICE >5YRS: HCPCS | Performed by: INTERNAL MEDICINE

## 2023-02-13 PROCEDURE — 88305 TISSUE EXAM BY PATHOLOGIST: CPT | Mod: TC | Performed by: INTERNAL MEDICINE

## 2023-02-13 PROCEDURE — 43239 EGD BIOPSY SINGLE/MULTIPLE: CPT | Performed by: INTERNAL MEDICINE

## 2023-02-13 PROCEDURE — 250N000009 HC RX 250: Performed by: INTERNAL MEDICINE

## 2023-02-13 PROCEDURE — 250N000011 HC RX IP 250 OP 636: Performed by: INTERNAL MEDICINE

## 2023-02-13 RX ORDER — FENTANYL CITRATE 50 UG/ML
50-100 INJECTION, SOLUTION INTRAMUSCULAR; INTRAVENOUS EVERY 5 MIN PRN
Status: DISCONTINUED | OUTPATIENT
Start: 2023-02-13 | End: 2023-02-13 | Stop reason: HOSPADM

## 2023-02-13 RX ORDER — LIDOCAINE 40 MG/G
CREAM TOPICAL
Status: DISCONTINUED | OUTPATIENT
Start: 2023-02-13 | End: 2023-02-13 | Stop reason: HOSPADM

## 2023-02-13 RX ORDER — NALOXONE HYDROCHLORIDE 0.4 MG/ML
0.2 INJECTION, SOLUTION INTRAMUSCULAR; INTRAVENOUS; SUBCUTANEOUS
Status: DISCONTINUED | OUTPATIENT
Start: 2023-02-13 | End: 2023-02-13 | Stop reason: HOSPADM

## 2023-02-13 RX ORDER — ONDANSETRON 2 MG/ML
4 INJECTION INTRAMUSCULAR; INTRAVENOUS EVERY 6 HOURS PRN
Status: DISCONTINUED | OUTPATIENT
Start: 2023-02-13 | End: 2023-02-13 | Stop reason: HOSPADM

## 2023-02-13 RX ORDER — NALOXONE HYDROCHLORIDE 0.4 MG/ML
0.4 INJECTION, SOLUTION INTRAMUSCULAR; INTRAVENOUS; SUBCUTANEOUS
Status: DISCONTINUED | OUTPATIENT
Start: 2023-02-13 | End: 2023-02-13 | Stop reason: HOSPADM

## 2023-02-13 RX ORDER — FLUMAZENIL 0.1 MG/ML
0.2 INJECTION, SOLUTION INTRAVENOUS
Status: DISCONTINUED | OUTPATIENT
Start: 2023-02-13 | End: 2023-02-13 | Stop reason: HOSPADM

## 2023-02-13 RX ORDER — ATROPINE SULFATE 0.1 MG/ML
1 INJECTION INTRAVENOUS
Status: DISCONTINUED | OUTPATIENT
Start: 2023-02-13 | End: 2023-02-13 | Stop reason: HOSPADM

## 2023-02-13 RX ORDER — EPINEPHRINE 1 MG/ML
0.1 INJECTION, SOLUTION INTRAMUSCULAR; SUBCUTANEOUS
Status: DISCONTINUED | OUTPATIENT
Start: 2023-02-13 | End: 2023-02-13 | Stop reason: HOSPADM

## 2023-02-13 RX ORDER — PROCHLORPERAZINE MALEATE 5 MG
5 TABLET ORAL EVERY 6 HOURS PRN
Status: DISCONTINUED | OUTPATIENT
Start: 2023-02-13 | End: 2023-02-13 | Stop reason: HOSPADM

## 2023-02-13 RX ORDER — DIPHENHYDRAMINE HYDROCHLORIDE 50 MG/ML
25-50 INJECTION INTRAMUSCULAR; INTRAVENOUS
Status: DISCONTINUED | OUTPATIENT
Start: 2023-02-13 | End: 2023-02-13 | Stop reason: HOSPADM

## 2023-02-13 RX ORDER — ONDANSETRON 2 MG/ML
4 INJECTION INTRAMUSCULAR; INTRAVENOUS
Status: DISCONTINUED | OUTPATIENT
Start: 2023-02-13 | End: 2023-02-13 | Stop reason: HOSPADM

## 2023-02-13 RX ORDER — SIMETHICONE 40MG/0.6ML
133 SUSPENSION, DROPS(FINAL DOSAGE FORM)(ML) ORAL
Status: DISCONTINUED | OUTPATIENT
Start: 2023-02-13 | End: 2023-02-13 | Stop reason: HOSPADM

## 2023-02-13 RX ORDER — ONDANSETRON 4 MG/1
4 TABLET, ORALLY DISINTEGRATING ORAL EVERY 6 HOURS PRN
Status: DISCONTINUED | OUTPATIENT
Start: 2023-02-13 | End: 2023-02-13 | Stop reason: HOSPADM

## 2023-02-13 RX ADMIN — MIDAZOLAM 1 MG: 1 INJECTION INTRAMUSCULAR; INTRAVENOUS at 12:16

## 2023-02-13 RX ADMIN — TOPICAL ANESTHETIC 0.5 ML: 200 SPRAY DENTAL; PERIODONTAL at 12:18

## 2023-02-13 RX ADMIN — FENTANYL CITRATE 50 MCG: 50 INJECTION, SOLUTION INTRAMUSCULAR; INTRAVENOUS at 12:16

## 2023-02-13 ASSESSMENT — ACTIVITIES OF DAILY LIVING (ADL): ADLS_ACUITY_SCORE: 35

## 2023-02-13 NOTE — H&P
Pre-Endoscopy History and Physical     Rodney Hendrickson MRN# [id number]   YOB: 1945 Age: 77 year old     Date of Procedure: 2/13/2023  Primary care provider: Brennan Mercado  Type of Endoscopy: Gastroscopy with possible biopsy, possible dilation  Reason for Procedure: Cardenas's  Type of Anesthesia Anticipated: Conscious Sedation    HPI:    Rodney is a 77 year old male who will be undergoing the above procedure.      A history and physical has been performed. The patient's medications and allergies have been reviewed. The risks and benefits of the procedure and the sedation options and risks were discussed with the patient.  All questions were answered and informed consent was obtained.      He denies a personal or family history of anesthesia complications or bleeding disorders.     Patient Active Problem List   Diagnosis     Benign non-nodular prostatic hyperplasia with lower urinary tract symptoms     Hyperlipidemia LDL goal <130     Hypertension     Morbid obesity due to excess calories (H)     DOMINIK (obstructive sleep apnea)     CKD (chronic kidney disease) stage 3, GFR 30-59 ml/min (H)        Past Medical History:   Diagnosis Date     BPH (benign prostatic hyperplasia)      GERD (gastroesophageal reflux disease)      Hyperlipidemia LDL goal <130      Hypertension         Past Surgical History:   Procedure Laterality Date     ARTHROPLASTY PATELLO-FEMORAL (KNEE)      right knee replacement     COLONOSCOPY       COLONOSCOPY N/A 1/18/2017    Procedure: COLONOSCOPY;  Surgeon: Geoffrey Gallegos MD;  Location:  GI     ESOPHAGOSCOPY, GASTROSCOPY, DUODENOSCOPY (EGD), COMBINED  5/3/2013    Procedure: COMBINED ESOPHAGOSCOPY, GASTROSCOPY, DUODENOSCOPY (EGD), BIOPSY SINGLE OR MULTIPLE;  ESOPHAGOSCOPY, GASTROSCOPY, DUODENOSCOPY (EGD) with biopies;  Surgeon: Geoffrey Gallegos MD;  Location:  GI     ESOPHAGOSCOPY, GASTROSCOPY, DUODENOSCOPY (EGD), COMBINED N/A 1/8/2016    Procedure: COMBINED ESOPHAGOSCOPY,  GASTROSCOPY, DUODENOSCOPY (EGD), BIOPSY SINGLE OR MULTIPLE;  Surgeon: Geoffrey Gallegos MD;  Location: [facility]     ESOPHAGOSCOPY, GASTROSCOPY, DUODENOSCOPY (EGD), COMBINED N/A 2017    Procedure: COMBINED ESOPHAGOSCOPY, GASTROSCOPY, DUODENOSCOPY (EGD), BIOPSY SINGLE OR MULTIPLE;  Surgeon: Geoffrey Gallegos MD;  Location: [facility]     ESOPHAGOSCOPY, GASTROSCOPY, DUODENOSCOPY (EGD), COMBINED N/A 2018    Procedure: COMBINED ESOPHAGOSCOPY, GASTROSCOPY, DUODENOSCOPY (EGD), BIOPSY SINGLE OR MULTIPLE;  ESOPHAGOSCOPY, GASTROSCOPY, DUODENOSCOPY (EGD) with biopsies using cold forceps;  Surgeon: Geoffrey Gallegos MD;  Location: [facility] GI     ROTATOR CUFF REPAIR RT/LT      right and left       Social History     Tobacco Use     Smoking status: Former     Types: Cigarettes     Quit date: 1983     Years since quittin.8     Smokeless tobacco: Never   Substance Use Topics     Alcohol use: Yes     Comment: once every couple monthly       Family History   Problem Relation Age of Onset     Hypertension Father      Colon Cancer Father          of Colon CA     Heart Defect Father         Enlarged heart     Hypertension Brother      Hypertension Sister      Hypertension Brother        Prior to Admission medications    Medication Sig Start Date End Date Taking? Authorizing Provider   aspirin 81 MG tablet Take  by mouth daily.   Yes Reported, Patient   lisinopril-hydrochlorothiazide (ZESTORETIC) 20-12.5 MG tablet Take 2 tablets by mouth daily 22  Yes Brennan Mercado MD   allopurinol (ZYLOPRIM) 100 MG tablet Take 1 tablet (100 mg) by mouth daily 22   Brennan Mercado MD   atorvastatin (LIPITOR) 40 MG tablet Take 1 tablet (40 mg) by mouth daily 22   Brennan Mercado MD   colchicine (COLCYRS) 0.6 MG tablet Take 1 tablet (0.6 mg) by mouth daily For gout flare up 22   Brennan Mercado MD   ferrous sulfate (IRON) 325 (65 FE) MG tablet Take by mouth At Bedtime     Reported, Patient  "  finasteride (PROSCAR) 5 MG tablet Take 1 tablet (5 mg) by mouth daily 3/15/22   Brennan Mercado MD   omeprazole (PRILOSEC) 40 MG DR capsule take 1 capsule (40mg) by mouth daily. take 30 to 60 minutes before a meal. 3/15/22   Brennan Mercado MD   tamsulosin (FLOMAX) 0.4 MG capsule TAKE 1 CAPSULE (0.4MG) BY MOUTH DAILY 9/12/22   Beatrice Mendoza NP   tiZANidine (ZANAFLEX) 4 MG tablet Take 1 tablet by mouth every bedtime for muscle tightness for 10 days then use as needed 2/21/22   Brennan Mercado MD       No Known Allergies     REVIEW OF SYSTEMS:   5 point ROS negative except as noted above in HPI, including Gen., Resp., CV, GI &  system review.    PHYSICAL EXAM:   There were no vitals taken for this visit. Estimated body mass index is 36.8 kg/m  as calculated from the following:    Height as of 2/21/22: 1.626 m (5' 4\").    Weight as of 2/21/22: 97.3 kg (214 lb 6.4 oz).   GENERAL APPEARANCE: alert, and oriented  MENTAL STATUS: alert  AIRWAY EXAM: Mallampatti Class I (visualization of the soft palate, fauces, uvula, anterior and posterior pillars)  RESP: lungs clear to auscultation - no rales, rhonchi or wheezes  CV: regular rates and rhythm  DIAGNOSTICS:    Not indicated    IMPRESSION   ASA Class 2 - Mild systemic disease    PLAN:   Plan for Gastroscopy with possible biopsy, possible dilation. We discussed the risks, benefits and alternatives and the patient wished to proceed.    The above has been forwarded to the consulting provider.      Signed Electronically by: Geoffrey Gallegos MD  February 13, 2023          "

## 2023-02-13 NOTE — DISCHARGE INSTRUCTIONS
The patient has received a copy of the Provation report the doctor has written and discharge instructions have been discussed with the patient and responsible adult.  All questions were addressed and answered prior to patient discharge.

## 2023-02-14 LAB
PATH REPORT.COMMENTS IMP SPEC: NORMAL
PATH REPORT.COMMENTS IMP SPEC: NORMAL
PATH REPORT.FINAL DX SPEC: NORMAL
PATH REPORT.GROSS SPEC: NORMAL
PATH REPORT.MICROSCOPIC SPEC OTHER STN: NORMAL
PATH REPORT.RELEVANT HX SPEC: NORMAL
PHOTO IMAGE: NORMAL

## 2023-02-14 PROCEDURE — 88305 TISSUE EXAM BY PATHOLOGIST: CPT | Mod: 26 | Performed by: PATHOLOGY

## 2023-02-24 ENCOUNTER — LAB (OUTPATIENT)
Dept: LAB | Facility: CLINIC | Age: 78
End: 2023-02-24
Payer: COMMERCIAL

## 2023-02-24 DIAGNOSIS — Z12.5 SCREENING FOR PROSTATE CANCER: ICD-10-CM

## 2023-02-24 DIAGNOSIS — Z00.00 ENCOUNTER FOR PREVENTATIVE ADULT HEALTH CARE EXAMINATION: Primary | ICD-10-CM

## 2023-02-24 DIAGNOSIS — N18.32 STAGE 3B CHRONIC KIDNEY DISEASE (H): ICD-10-CM

## 2023-02-24 DIAGNOSIS — I10 PRIMARY HYPERTENSION: ICD-10-CM

## 2023-02-24 DIAGNOSIS — E79.0 HYPERURICEMIA: ICD-10-CM

## 2023-02-24 DIAGNOSIS — E78.5 HYPERLIPIDEMIA LDL GOAL <130: ICD-10-CM

## 2023-02-24 LAB
ALBUMIN SERPL BCG-MCNC: 4.3 G/DL (ref 3.5–5.2)
ALBUMIN UR-MCNC: NEGATIVE MG/DL
ALP SERPL-CCNC: 80 U/L (ref 40–129)
ALT SERPL W P-5'-P-CCNC: 23 U/L (ref 10–50)
ANION GAP SERPL CALCULATED.3IONS-SCNC: 11 MMOL/L (ref 7–15)
APPEARANCE UR: CLEAR
AST SERPL W P-5'-P-CCNC: 24 U/L (ref 10–50)
BACTERIA #/AREA URNS HPF: ABNORMAL /HPF
BILIRUB SERPL-MCNC: 0.3 MG/DL
BILIRUB UR QL STRIP: NEGATIVE
BUN SERPL-MCNC: 26.6 MG/DL (ref 8–23)
CALCIUM SERPL-MCNC: 9.9 MG/DL (ref 8.8–10.2)
CHLORIDE SERPL-SCNC: 100 MMOL/L (ref 98–107)
CHOLEST SERPL-MCNC: 179 MG/DL
COLOR UR AUTO: YELLOW
CREAT SERPL-MCNC: 1.66 MG/DL (ref 0.67–1.17)
DEPRECATED HCO3 PLAS-SCNC: 27 MMOL/L (ref 22–29)
ERYTHROCYTE [DISTWIDTH] IN BLOOD BY AUTOMATED COUNT: 11.8 % (ref 10–15)
GFR SERPL CREATININE-BSD FRML MDRD: 42 ML/MIN/1.73M2
GLUCOSE SERPL-MCNC: 180 MG/DL (ref 70–99)
GLUCOSE UR STRIP-MCNC: NEGATIVE MG/DL
HCT VFR BLD AUTO: 39.7 % (ref 40–53)
HDLC SERPL-MCNC: 38 MG/DL
HGB BLD-MCNC: 13.4 G/DL (ref 13.3–17.7)
HGB UR QL STRIP: NEGATIVE
HOLD SPECIMEN: NORMAL
KETONES UR STRIP-MCNC: NEGATIVE MG/DL
LDLC SERPL CALC-MCNC: 100 MG/DL
LEUKOCYTE ESTERASE UR QL STRIP: NEGATIVE
MCH RBC QN AUTO: 31.3 PG (ref 26.5–33)
MCHC RBC AUTO-ENTMCNC: 33.8 G/DL (ref 31.5–36.5)
MCV RBC AUTO: 93 FL (ref 78–100)
NITRATE UR QL: NEGATIVE
NONHDLC SERPL-MCNC: 141 MG/DL
PH UR STRIP: 5 [PH] (ref 5–7)
PLATELET # BLD AUTO: 223 10E3/UL (ref 150–450)
POTASSIUM SERPL-SCNC: 4.7 MMOL/L (ref 3.4–5.3)
PROT SERPL-MCNC: 7.3 G/DL (ref 6.4–8.3)
PSA SERPL-MCNC: 0.47 NG/ML (ref 0–6.5)
RBC # BLD AUTO: 4.28 10E6/UL (ref 4.4–5.9)
RBC #/AREA URNS AUTO: ABNORMAL /HPF
SODIUM SERPL-SCNC: 138 MMOL/L (ref 136–145)
SP GR UR STRIP: 1.01 (ref 1–1.03)
TRIGL SERPL-MCNC: 205 MG/DL
URATE SERPL-MCNC: 7.3 MG/DL (ref 3.4–7)
UROBILINOGEN UR STRIP-ACNC: 0.2 E.U./DL
WBC # BLD AUTO: 7.3 10E3/UL (ref 4–11)
WBC #/AREA URNS AUTO: ABNORMAL /HPF

## 2023-02-24 PROCEDURE — 85027 COMPLETE CBC AUTOMATED: CPT | Performed by: INTERNAL MEDICINE

## 2023-02-24 PROCEDURE — 84550 ASSAY OF BLOOD/URIC ACID: CPT | Performed by: INTERNAL MEDICINE

## 2023-02-24 PROCEDURE — 80061 LIPID PANEL: CPT | Performed by: INTERNAL MEDICINE

## 2023-02-24 PROCEDURE — G0103 PSA SCREENING: HCPCS | Performed by: INTERNAL MEDICINE

## 2023-02-24 PROCEDURE — 80053 COMPREHEN METABOLIC PANEL: CPT | Performed by: INTERNAL MEDICINE

## 2023-02-24 PROCEDURE — 36415 COLL VENOUS BLD VENIPUNCTURE: CPT | Performed by: INTERNAL MEDICINE

## 2023-02-24 PROCEDURE — 81001 URINALYSIS AUTO W/SCOPE: CPT | Performed by: INTERNAL MEDICINE

## 2023-02-24 NOTE — LETTER
February 27, 2023      Rodney Hendrickson  [address] [address]        Dear ,    We are writing to inform you of your test results. Your labs show slightly decreased kidney function, acceptable results. We will discuss on office visit.       Resulted Orders   Lipid panel reflex to direct LDL Fasting   Result Value Ref Range    Cholesterol 179 <200 mg/dL    Triglycerides 205 (H) <150 mg/dL    Direct Measure HDL 38 (L) >=40 mg/dL    LDL Cholesterol Calculated 100 <=100 mg/dL    Non HDL Cholesterol 141 (H) <130 mg/dL    Narrative    Cholesterol  Desirable:  <200 mg/dL    Triglycerides  Normal:  Less than 150 mg/dL  Borderline High:  150-199 mg/dL  High:  200-499 mg/dL  Very High:  Greater than or equal to 500 mg/dL    Direct Measure HDL  Female:  Greater than or equal to 50 mg/dL   Male:  Greater than or equal to 40 mg/dL    LDL Cholesterol  Desirable:  <100mg/dL  Above Desirable:  100-129 mg/dL   Borderline High:  130-159 mg/dL   High:  160-189 mg/dL   Very High:  >= 190 mg/dL    Non HDL Cholesterol  Desirable:  130 mg/dL  Above Desirable:  130-159 mg/dL  Borderline High:  160-189 mg/dL  High:  190-219 mg/dL  Very High:  Greater than or equal to 220 mg/dL   CBC with platelets   Result Value Ref Range    WBC Count 7.3 4.0 - 11.0 10e3/uL    RBC Count 4.28 (L) 4.40 - 5.90 10e6/uL    Hemoglobin 13.4 13.3 - 17.7 g/dL    Hematocrit 39.7 (L) 40.0 - 53.0 %    MCV 93 78 - 100 fL    MCH 31.3 26.5 - 33.0 pg    MCHC 33.8 31.5 - 36.5 g/dL    RDW 11.8 10.0 - 15.0 %    Platelet Count 223 150 - 450 10e3/uL   Comprehensive metabolic panel (BMP + Alb, Alk Phos, ALT, AST, Total. Bili, TP)   Result Value Ref Range    Sodium 138 136 - 145 mmol/L    Potassium 4.7 3.4 - 5.3 mmol/L    Chloride 100 98 - 107 mmol/L    Carbon Dioxide (CO2) 27 22 - 29 mmol/L    Anion Gap 11 7 - 15 mmol/L    Urea Nitrogen 26.6 (H) 8.0 - 23.0 mg/dL    Creatinine 1.66 (H) 0.67 - 1.17 mg/dL    Calcium 9.9 8.8 - 10.2 mg/dL    Glucose 180 (H)  70 - 99 mg/dL    Alkaline Phosphatase 80 40 - 129 U/L    AST 24 10 - 50 U/L    ALT 23 10 - 50 U/L    Protein Total 7.3 6.4 - 8.3 g/dL    Albumin 4.3 3.5 - 5.2 g/dL    Bilirubin Total 0.3 <=1.2 mg/dL    GFR Estimate 42 (L) >60 mL/min/1.73m2      Comment:      eGFR calculated using 2021 CKD-EPI equation.   PSA, screen   Result Value Ref Range    Prostate Specific Antigen Screen 0.47 0.00 - 6.50 ng/mL    Narrative    This result is obtained using the Roche Elecsys total PSA method on the albert e801 immunoassay analyzer. Results obtained with different assay methods or kits cannot be used interchangeably.   UA with Microscopic reflex to Culture - lab collect   Result Value Ref Range    Color Urine Yellow Colorless, Straw, Light Yellow, Yellow    Appearance Urine Clear Clear    Glucose Urine Negative Negative mg/dL    Bilirubin Urine Negative Negative    Ketones Urine Negative Negative mg/dL    Specific Gravity Urine 1.015 1.003 - 1.035    Blood Urine Negative Negative    pH Urine 5.0 5.0 - 7.0    Protein Albumin Urine Negative Negative mg/dL    Urobilinogen Urine 0.2 0.2, 1.0 E.U./dL    Nitrite Urine Negative Negative    Leukocyte Esterase Urine Negative Negative   Uric acid   Result Value Ref Range    Uric Acid 7.3 (H) 3.4 - 7.0 mg/dL   Extra SST Tube (LAB USE ONLY)   Result Value Ref Range    Hold Specimen [facility]    Urine Microscopic   Result Value Ref Range    Bacteria Urine Few (A) None Seen /HPF    RBC Urine 0-2 0-2 /HPF /HPF    WBC Urine 0-5 0-5 /HPF /HPF    Narrative    Urine Culture not indicated       If you have any questions or concerns, please call the clinic at the number listed above.       Sincerely,      Brennan Mercado MD        [facility]ia [facility]

## 2023-03-01 ENCOUNTER — OFFICE VISIT (OUTPATIENT)
Dept: INTERNAL MEDICINE | Facility: CLINIC | Age: 78
End: 2023-03-01
Payer: COMMERCIAL

## 2023-03-01 VITALS
RESPIRATION RATE: 16 BRPM | OXYGEN SATURATION: 97 % | HEART RATE: 74 BPM | SYSTOLIC BLOOD PRESSURE: 138 MMHG | TEMPERATURE: 96.4 F | HEIGHT: 64 IN | DIASTOLIC BLOOD PRESSURE: 78 MMHG | BODY MASS INDEX: 37.49 KG/M2 | WEIGHT: 219.6 LBS

## 2023-03-01 DIAGNOSIS — Z00.00 ENCOUNTER FOR PREVENTATIVE ADULT HEALTH CARE EXAMINATION: Primary | ICD-10-CM

## 2023-03-01 DIAGNOSIS — E78.5 HYPERLIPIDEMIA LDL GOAL <130: ICD-10-CM

## 2023-03-01 DIAGNOSIS — E66.01 MORBID OBESITY DUE TO EXCESS CALORIES (H): ICD-10-CM

## 2023-03-01 DIAGNOSIS — N18.32 STAGE 3B CHRONIC KIDNEY DISEASE (H): ICD-10-CM

## 2023-03-01 DIAGNOSIS — N40.1 BENIGN NON-NODULAR PROSTATIC HYPERPLASIA WITH LOWER URINARY TRACT SYMPTOMS: ICD-10-CM

## 2023-03-01 DIAGNOSIS — R73.03 BORDERLINE DIABETES: ICD-10-CM

## 2023-03-01 DIAGNOSIS — G47.33 OSA (OBSTRUCTIVE SLEEP APNEA): ICD-10-CM

## 2023-03-01 LAB — HBA1C MFR BLD: 7.7 % (ref 0–5.6)

## 2023-03-01 PROCEDURE — 36415 COLL VENOUS BLD VENIPUNCTURE: CPT | Performed by: INTERNAL MEDICINE

## 2023-03-01 PROCEDURE — 83036 HEMOGLOBIN GLYCOSYLATED A1C: CPT | Performed by: INTERNAL MEDICINE

## 2023-03-01 PROCEDURE — 99397 PER PM REEVAL EST PAT 65+ YR: CPT | Performed by: INTERNAL MEDICINE

## 2023-03-01 ASSESSMENT — ENCOUNTER SYMPTOMS
HEMATOCHEZIA: 0
SORE THROAT: 0
FREQUENCY: 1
PARESTHESIAS: 0
HEMATURIA: 0
ABDOMINAL PAIN: 0
FEVER: 0
ARTHRALGIAS: 1
JOINT SWELLING: 0
NERVOUS/ANXIOUS: 0
HEADACHES: 0
WEAKNESS: 0
EYE PAIN: 0
CHILLS: 1
DIZZINESS: 0
NAUSEA: 0
DIARRHEA: 0
PALPITATIONS: 0
CONSTIPATION: 0
COUGH: 0
DYSURIA: 0
HEARTBURN: 0
MYALGIAS: 0
SHORTNESS OF BREATH: 0

## 2023-03-01 ASSESSMENT — PAIN SCALES - GENERAL: PAINLEVEL: MILD PAIN (3)

## 2023-03-01 ASSESSMENT — ACTIVITIES OF DAILY LIVING (ADL): CURRENT_FUNCTION: NO ASSISTANCE NEEDED

## 2023-03-01 NOTE — PROGRESS NOTES
"SUBJECTIVE:   Rodney is a 77 year old who presents for Preventive Visit.    Patient has been advised of split billing requirements and indicates understanding: Yes  Are you in the first 12 months of your Medicare coverage?  No    Healthy Habits:     In general, how would you rate your overall health?  Good    Frequency of exercise:  4-5 days/week    Duration of exercise:  45-60 minutes    Do you usually eat at least 4 servings of fruit and vegetables a day, include whole grains    & fiber and avoid regularly eating high fat or \"junk\" foods?  Yes    Taking medications regularly:  Yes    Medication side effects:  None    Ability to successfully perform activities of daily living:  No assistance needed    Home Safety:  No safety concerns identified    Hearing Impairment:  No hearing concerns    In the past 6 months, have you been bothered by leaking of urine?  No    In general, how would you rate your overall mental or emotional health?  Good      PHQ-2 Total Score: 0    Additional concerns today:  No      Have you ever done Advance Care Planning? (For example, a Health Directive, POLST, or a discussion with a medical provider or your loved ones about your wishes): Yes, patient states has an Advance Care Planning document and will bring a copy to the clinic.       Fall risk  Fallen 2 or more times in the past year?: Yes  Any fall with injury in the past year?: No    Cognitive Screening   1) Repeat 3 items (Leader, Season, Table)    2) Clock draw: NORMAL  3) 3 item recall: Recalls 3 objects  Results: 3 items recalled: COGNITIVE IMPAIRMENT LESS LIKELY    Mini-CogTM Copyright BAN Bailey. Licensed by the author for use in [facility]; reprinted with permission ([email or web address]). All rights reserved.      Do you have sleep apnea, excessive snoring or daytime drowsiness?: yes    Reviewed and updated as needed this visit by clinical staff   Tobacco  Allergies  Meds  Problems  Med Hx  Surg Hx  Fam Hx      "     Reviewed and updated as needed this visit by Provider                 Social History     Tobacco Use     Smoking status: Former     Types: Cigarettes     Quit date: 1983     Years since quittin.8     Smokeless tobacco: Never   Substance Use Topics     Alcohol use: Yes     Comment: once every couple monthly         Alcohol Use 3/1/2023   Prescreen: >3 drinks/day or >7 drinks/week? Not Applicable           PROBLEMS TO ADD ON...  Has h/o HTN. on medical treatment. BP has been controlled. No side effects from medications. No CP, HA, dizziness. good compliance with medications and low salt diet.  Has H/O hyperlipidemia. On medical treatment and diet. No side effects. No muscle weakness, myalgias or upset stomach.   Has h/o CRF. Monitoring BP, BG, medications, avoiding OTC NSAIDs. Needs periodic recheck of kidney function.  Has H/O DM. On diet , exercise. Blood sugars are controlled. No parestesias. No hypoglycemias.  Has h/o gout, has had a flare up once, on Allopurinol and PRN Colchicine   Has DOMINIK, uses CPAP.     Current providers sharing in care for this patient include:   Patient Care Team:  Brennan Mercado MD as PCP - General (Internal Medicine)  Brennan Mercado MD as Assigned PCP    The following health maintenance items are reviewed in Epic and correct as of today:  Health Maintenance   Topic Date Due     ZOSTER IMMUNIZATION (1 of 2) Never done     ANNUAL REVIEW OF HM ORDERS  2023     MEDICARE ANNUAL WELLNESS VISIT  2023     MICROALBUMIN  2023     BMP  2024     LIPID  2024     HEMOGLOBIN  2024     FALL RISK ASSESSMENT  2024     ADVANCE CARE PLANNING  2027     DTAP/TDAP/TD IMMUNIZATION (5 - Td or Tdap) 2032     HEPATITIS C SCREENING  Completed     PHQ-2 (once per calendar year)  Completed     INFLUENZA VACCINE  Completed     Pneumococcal Vaccine: 65+ Years  Completed     URINALYSIS  Completed     COVID-19 Vaccine  Completed     IPV  "IMMUNIZATION  Aged Out     MENINGITIS IMMUNIZATION  Aged Out     COLORECTAL CANCER SCREENING  Discontinued     Lab work is in process  Labs reviewed in EPIC          Review of Systems   Constitutional: Positive for chills. Negative for fever.   HENT: Positive for hearing loss. Negative for congestion, ear pain and sore throat.    Eyes: Negative for pain and visual disturbance.   Respiratory: Negative for cough and shortness of breath.    Cardiovascular: Negative for chest pain, palpitations and peripheral edema.   Gastrointestinal: Negative for abdominal pain, constipation, diarrhea, heartburn, hematochezia and nausea.   Genitourinary: Positive for frequency and impotence. Negative for dysuria, genital sores, hematuria, penile discharge and urgency.   Musculoskeletal: Positive for arthralgias. Negative for joint swelling and myalgias.   Skin: Negative for rash.   Neurological: Negative for dizziness, weakness, headaches and paresthesias.   Psychiatric/Behavioral: Negative for mood changes. The patient is not nervous/anxious.      Constitutional, HEENT, cardiovascular, pulmonary, GI, , musculoskeletal, neuro, skin, endocrine and psych systems are negative, except as otherwise noted.    OBJECTIVE:   /78 (BP Location: Left arm, Cuff Size: Adult Regular)   Pulse 74   Temp (!) 96.4  F (35.8  C) (Tympanic)   Resp 16   Ht 1.626 m (5' 4\")   Wt 99.6 kg (219 lb 9.6 oz)   SpO2 97%   BMI 37.69 kg/m   Estimated body mass index is 37.69 kg/m  as calculated from the following:    Height as of this encounter: 1.626 m (5' 4\").    Weight as of this encounter: 99.6 kg (219 lb 9.6 oz).  Physical Exam  GENERAL: obese, alert and no distress  EYES: Eyes grossly normal to inspection, PERRL and conjunctivae and sclerae normal  HENT: ear canals and TM's normal, nose and mouth without ulcers or lesions  NECK: no adenopathy, no asymmetry, masses, or scars and thyroid normal to palpation  RESP: lungs clear to auscultation - no " rales, rhonchi or wheezes  CV: regular rate and rhythm, normal S1 S2, no S3 or S4, no murmur, click or rub, peripheral pulses strong  ABDOMEN: soft,obese, umbilical hernia, nontender, no hepatosplenomegaly, no masses and bowel sounds normal  MS: no gross musculoskeletal defects noted, trace LE edema  SKIN: no suspicious lesions or rashes  NEURO: Normal strength and tone, mentation intact and speech normal  PSYCH: mentation appears normal, affect normal/bright    Diagnostic Test Results:  Labs reviewed in Epic    ASSESSMENT / PLAN:       ICD-10-CM    1. Encounter for preventative adult health care examination  Z00.00       2. Borderline diabetes  R73.03 Hemoglobin A1c     OFFICE/OUTPT VISIT,EST,LEVL III      3. DOMINIK (obstructive sleep apnea)  G47.33       4. Morbid obesity due to excess calories (H)  E66.01       5. Hyperlipidemia LDL goal <130  E78.5 OFFICE/OUTPT VISIT,EST,LEVL III      6. Benign non-nodular prostatic hyperplasia with lower urinary tract symptoms  N40.1 OFFICE/OUTPT VISIT,EST,LEVL III      7. Stage 3b chronic kidney disease (H)  N18.32 OFFICE/OUTPT VISIT,EST,LEVL III        Continue treatment  Assess DM  Controlled HTN  Controlled lipids   Consider increased Allopurinol dose if increased gout flare ups     Patient has been advised of split billing requirements and indicates understanding: Yes      COUNSELING:  Reviewed preventive health counseling, as reflected in patient instructions       Regular exercise       Healthy diet/nutrition       Vision screening       Hearing screening       Dental care       Colon cancer screening       Prostate cancer screening        He reports that he quit smoking about 39 years ago. His smoking use included cigarettes. He has never used smokeless tobacco.      Appropriate preventive services were discussed with this patient, including applicable screening as appropriate for cardiovascular disease, diabetes, osteopenia/osteoporosis, and glaucoma.  As appropriate for  age/gender, discussed screening for colorectal cancer, prostate cancer, breast cancer, and cervical cancer. Checklist reviewing preventive services available has been given to the patient.    Reviewed patients plan of care and provided an AVS. The Intermediate Care Plan ( asthma action plan, low back pain action plan, and migraine action plan) for Rodney meets the Care Plan requirement. This Care Plan has been established and reviewed with the Patient.          Brennan Mercado MD  [facility]    Identified Health Risks:

## 2023-03-20 DIAGNOSIS — R35.0 BENIGN PROSTATIC HYPERPLASIA WITH URINARY FREQUENCY: ICD-10-CM

## 2023-03-20 DIAGNOSIS — N40.1 BENIGN NON-NODULAR PROSTATIC HYPERPLASIA WITH LOWER URINARY TRACT SYMPTOMS: ICD-10-CM

## 2023-03-20 DIAGNOSIS — I10 BENIGN ESSENTIAL HYPERTENSION: ICD-10-CM

## 2023-03-20 DIAGNOSIS — N40.1 BENIGN PROSTATIC HYPERPLASIA WITH URINARY FREQUENCY: ICD-10-CM

## 2023-03-20 DIAGNOSIS — K21.9 GASTROESOPHAGEAL REFLUX DISEASE WITHOUT ESOPHAGITIS: ICD-10-CM

## 2023-03-20 DIAGNOSIS — E78.5 HYPERLIPIDEMIA LDL GOAL <130: ICD-10-CM

## 2023-03-21 RX ORDER — TAMSULOSIN HYDROCHLORIDE 0.4 MG/1
CAPSULE ORAL
Qty: 90 CAPSULE | Refills: 3 | Status: SHIPPED | OUTPATIENT
Start: 2023-03-21 | End: 2024-03-13

## 2023-03-21 RX ORDER — FINASTERIDE 5 MG/1
5 TABLET, FILM COATED ORAL DAILY
Qty: 90 TABLET | Refills: 3 | Status: SHIPPED | OUTPATIENT
Start: 2023-03-21 | End: 2024-03-13

## 2023-03-21 RX ORDER — OMEPRAZOLE 40 MG/1
CAPSULE, DELAYED RELEASE ORAL
Qty: 90 CAPSULE | Refills: 3 | Status: SHIPPED | OUTPATIENT
Start: 2023-03-21 | End: 2024-03-13

## 2023-03-21 RX ORDER — LISINOPRIL AND HYDROCHLOROTHIAZIDE 12.5; 2 MG/1; MG/1
2 TABLET ORAL DAILY
Qty: 180 TABLET | Refills: 3 | Status: SHIPPED | OUTPATIENT
Start: 2023-03-21 | End: 2024-03-13

## 2023-03-21 RX ORDER — ATORVASTATIN CALCIUM 40 MG/1
40 TABLET, FILM COATED ORAL DAILY
Qty: 90 TABLET | Refills: 3 | Status: SHIPPED | OUTPATIENT
Start: 2023-03-21 | End: 2024-03-13

## 2023-03-21 NOTE — TELEPHONE ENCOUNTER
Provider approval needed.     Creatinine   Date Value Ref Range Status   02/24/2023 1.66 (H) 0.67 - 1.17 mg/dL Final   04/14/2021 1.52 (H) 0.66 - 1.25 mg/dL Final

## 2023-04-19 ENCOUNTER — TELEPHONE (OUTPATIENT)
Dept: INTERNAL MEDICINE | Facility: CLINIC | Age: 78
End: 2023-04-19
Payer: COMMERCIAL

## 2023-04-19 DIAGNOSIS — M10.079 ACUTE IDIOPATHIC GOUT OF FOOT, UNSPECIFIED LATERALITY: Primary | ICD-10-CM

## 2023-04-19 RX ORDER — PREDNISONE 10 MG/1
TABLET ORAL
Qty: 20 TABLET | Refills: 0 | Status: SHIPPED | OUTPATIENT
Start: 2023-04-19 | End: 2024-02-02

## 2023-04-19 NOTE — TELEPHONE ENCOUNTER
S-(situation): gout     B-(background): History of gout    A-(assessment): Complains of right great toe being hot, painful and red x3 days. Started Colchicine Mon. 4/17 at 3:00 a.m. and is not feeling any relief.  Rates pain 9/10, can hardly bear weight.  Last time he used Colchicine and it worked but no help this time.     R-(recommendations): Route to primary care provider for further direction. EB Can R.N.     Call patient back with response

## 2023-04-19 NOTE — TELEPHONE ENCOUNTER
Call to pt and advised. He agrees. He will start the Prednisone first and schedule appt if needed.

## 2023-05-19 ENCOUNTER — TRANSFERRED RECORDS (OUTPATIENT)
Dept: HEALTH INFORMATION MANAGEMENT | Facility: CLINIC | Age: 78
End: 2023-05-19
Payer: COMMERCIAL

## 2023-06-02 ENCOUNTER — OFFICE VISIT (OUTPATIENT)
Dept: SLEEP MEDICINE | Facility: CLINIC | Age: 78
End: 2023-06-02
Payer: COMMERCIAL

## 2023-06-02 VITALS
WEIGHT: 218.8 LBS | DIASTOLIC BLOOD PRESSURE: 76 MMHG | BODY MASS INDEX: 37.36 KG/M2 | HEIGHT: 64 IN | OXYGEN SATURATION: 98 % | HEART RATE: 74 BPM | SYSTOLIC BLOOD PRESSURE: 138 MMHG

## 2023-06-02 DIAGNOSIS — G47.33 OSA (OBSTRUCTIVE SLEEP APNEA): Primary | ICD-10-CM

## 2023-06-02 PROBLEM — E11.9 DIABETES MELLITUS, TYPE 2 (H): Status: ACTIVE | Noted: 2023-06-02

## 2023-06-02 PROCEDURE — 99214 OFFICE O/P EST MOD 30 MIN: CPT | Performed by: NURSE PRACTITIONER

## 2023-06-02 ASSESSMENT — SLEEP AND FATIGUE QUESTIONNAIRES
HOW LIKELY ARE YOU TO NOD OFF OR FALL ASLEEP IN A CAR, WHILE STOPPED FOR A FEW MINUTES IN TRAFFIC: HIGH CHANCE OF DOZING
HOW LIKELY ARE YOU TO NOD OFF OR FALL ASLEEP WHILE SITTING AND READING: MODERATE CHANCE OF DOZING
HOW LIKELY ARE YOU TO NOD OFF OR FALL ASLEEP WHILE WATCHING TV: HIGH CHANCE OF DOZING
HOW LIKELY ARE YOU TO NOD OFF OR FALL ASLEEP WHEN YOU ARE A PASSENGER IN A CAR FOR AN HOUR WITHOUT A BREAK: HIGH CHANCE OF DOZING
HOW LIKELY ARE YOU TO NOD OFF OR FALL ASLEEP WHILE SITTING QUIETLY AFTER LUNCH WITHOUT ALCOHOL: HIGH CHANCE OF DOZING
HOW LIKELY ARE YOU TO NOD OFF OR FALL ASLEEP WHILE LYING DOWN TO REST IN THE AFTERNOON WHEN CIRCUMSTANCES PERMIT: HIGH CHANCE OF DOZING
HOW LIKELY ARE YOU TO NOD OFF OR FALL ASLEEP WHILE SITTING INACTIVE IN A PUBLIC PLACE: HIGH CHANCE OF DOZING
HOW LIKELY ARE YOU TO NOD OFF OR FALL ASLEEP WHILE SITTING AND TALKING TO SOMEONE: HIGH CHANCE OF DOZING

## 2023-06-02 NOTE — PROGRESS NOTES
Sleep Apnea - Follow-up Visit:    Rodney Hendrickson presents in follow-up today presents in clinic for evaluation of efficacy and compliance with his CPAP therapy and treatment of his obstructive sleep apnea.  Patient last had follow-up visit 6/12/2020.  At that time his pressure settings were the same.  8-16 cm H2O.  Uses a ResMed PAP machine and and Eson 2 nasal mask.  He continues to change his mask every 2 weeks.  He does not use a chinstrap.  He is very mindful about his nasal mask leaking, and sleeps on his back.    Impression/Plan:  Obstructive Sleep Apnea   Will continue auto CPAP 8 to 16 cm H2O.  DME for supplies with 1 year.  He Continues to Use [employer] as his DME provider.   Rodney should follow-up in his medicine clinic in 1 year, or sooner if needed   Recommend optimizing his including medicines to any opportunities for sleep intrusion   Recommend safe driving practices including not driving if he is drowsy        30 minutes spent with patient, all of which were spent face-to-face counseling, consulting, coordinating plan of care.      CC:  Brennan Mercado,         History of Present Illness:  Chief Complaint   Patient presents with    CPAP Follow Up       Rodney Hendrickson presents for follow-up of their severe sleep apnea, managed with CPAP.     Previous Home Study Results:  4/12/17 (wt 197 lbs)  AHI 25.5/hr (supine 65.8/hr due to limited time of supine sleep)  Lowest O 2 saturation is 71%. Time spent O2 saturation below 88% was 16.1 minutes    Do you use a CPAP Machine at home: Yes  Overall, on a scale of 0-10 how would you rate your CPAP (0 poor, 10 great): 7    What type of mask do you use: Nasal Mask  Is your mask comfortable: Yes  If not, why:      Is your mask leaking: Yes  If yes, where do you feel it: around nose  How many night per week does the mask leak (0-7): 2    Do you notice snoring with mask on: No  Do you notice gasping arousals with mask on: No  Are you having significant  oral or nasal dryness: Yes  Is the pressure setting comfortable: Yes  If not, why:      What is your typical bedtime: 930  How long does it take you to go to sleep on PAP therapy:  5 mn  What time do you typically get out of bed for the day: 6  How many hours on average per night are you using PAP therapy: all the time  How many hours are you sleeping per night: 8  Do you feel well rested in the morning: Yes      ResMed   Auto-PAP 8.0 - 16.0 cmH2O 30 day usage data:    100% of days with > 4 hours of use. 0/30 days with no use.   Average use 471 minutes per day.   95%ile Leak 12.62 L/min.   CPAP 95% pressure 13.7 cm.   AHI 1.81 events per hour.        EPWORTH SLEEPINESS SCALE         6/2/2023     8:53 AM    [place]by Sleepiness Scale ( PANTERA Kellogg  9612-2140<br>ESS - [place]/English - Final version - 21 Nov 07 - [facility] [place].)   Sitting and reading Moderate chance of dozing   Watching TV High chance of dozing   Sitting, inactive in a public place (e.g. a theatre or a meeting) High chance of dozing   As a passenger in a car for an hour without a break High chance of dozing   Lying down to rest in the afternoon when circumstances permit High chance of dozing   Sitting and talking to someone High chance of dozing   Sitting quietly after a lunch without alcohol High chance of dozing   In a car, while stopped for a few minutes in traffic High chance of dozing   [place] Score (MC) 23   [place] Score (Sleep) 23       INSOMNIA SEVERITY INDEX (KRISTIE)          6/2/2023     8:47 AM   Insomnia Severity Index (KRISTIE)   Difficulty falling asleep 0   Difficulty staying asleep 2   Problems waking up too early 1   How SATISFIED/DISSATISFIED are you with your CURRENT sleep pattern? 2   How NOTICEABLE to others do you think your sleep problem is in terms of impairing the quality of your life? 3   How WORRIED/DISTRESSED are you about your current sleep problem? 1   To what extent do you consider your sleep problem to INTERFERE with your  "daily functioning (e.g. daytime fatigue, mood, ability to function at work/daily chores, concentration, memory, mood, etc.) CURRENTLY? 1   KRISTIE Total Score 10       Guidelines for Scoring/Interpretation:  Total score categories:  0-7 = No clinically significant insomnia   8-14 = Subthreshold insomnia   15-21 = Clinical insomnia (moderate severity)  22-28 = Clinical insomnia (severe)  Used via courtesy of www.ParQnowth.va.gov with permission from Leon Ocampo PhD., [facility]      Past medical/surgical history, family history, social history, medications and allergies were reviewed.        Problem List:  Patient Active Problem List    Diagnosis Date Noted    CKD (chronic kidney disease) stage 3, GFR 30-59 ml/min (H) 12/19/2019     Priority: Medium    DOMINIK (obstructive sleep apnea) 05/23/2017     Priority: Medium    Benign non-nodular prostatic hyperplasia with lower urinary tract symptoms 03/15/2017     Priority: Medium    Morbid obesity due to excess calories (H) 03/15/2017     Priority: Medium    Hyperlipidemia LDL goal <130      Priority: Medium    Hypertension      Priority: Medium        /76   Pulse 74   Ht 1.632 m (5' 4.25\")   Wt 99.2 kg (218 lb 12.8 oz)   SpO2 98%   BMI 37.27 kg/m        MAXIMILIANO Collado, CNP  Sleep Medicine    This note was written with the assistance of the Dragon voice-dictation technology software. The final document, although reviewed, may contain errors. For corrections, please contact the office.    "

## 2023-06-02 NOTE — NURSING NOTE
"Chief Complaint   Patient presents with     CPAP Follow Up       Initial /76   Pulse 74   Ht 1.632 m (5' 4.25\")   Wt 99.2 kg (218 lb 12.8 oz)   SpO2 98%   BMI 37.27 kg/m   Estimated body mass index is 37.27 kg/m  as calculated from the following:    Height as of this encounter: 1.632 m (5' 4.25\").    Weight as of this encounter: 99.2 kg (218 lb 12.8 oz).    Medication Reconciliation: complete    Neck circumference:     DME: ONESIMO Foster MA  "

## 2023-06-13 ENCOUNTER — LAB (OUTPATIENT)
Dept: LAB | Facility: CLINIC | Age: 78
End: 2023-06-13
Payer: COMMERCIAL

## 2023-06-13 DIAGNOSIS — R73.03 BORDERLINE DIABETES: ICD-10-CM

## 2023-06-13 LAB — HBA1C MFR BLD: 7.9 % (ref 0–5.6)

## 2023-06-13 PROCEDURE — 83036 HEMOGLOBIN GLYCOSYLATED A1C: CPT

## 2023-06-13 PROCEDURE — 36415 COLL VENOUS BLD VENIPUNCTURE: CPT

## 2023-06-13 NOTE — RESULT ENCOUNTER NOTE
Letter and lab results mailed to patient.  Spoke to patient and he is agreeable to starting the metformin.  Please send to pharmacy.     Tarah Lou MA

## 2023-06-13 NOTE — LETTER
June 13, 2023      Rodney Hendrickson  [id number] [id number]        Dear ,    We are writing to inform you of your test results.    Not well controlled diabetes. Recommend to start treatment along with diet and exercise - start Metformin.     Resulted Orders   Hemoglobin A1c FUTURE 3mo   Result Value Ref Range    Hemoglobin A1C 7.9 (H) 0.0 - 5.6 %      Comment:      Normal <5.7%   Prediabetes 5.7-6.4%    Diabetes 6.5% or higher     Note: Adopted from ADA consensus guidelines.       If you have any questions or concerns, please call the clinic at the number listed above.       Sincerely,      Brennan Mercado MD

## 2023-06-14 DIAGNOSIS — E11.9 TYPE 2 DIABETES MELLITUS WITHOUT COMPLICATION, WITHOUT LONG-TERM CURRENT USE OF INSULIN (H): Primary | ICD-10-CM

## 2023-06-30 ENCOUNTER — DOCUMENTATION ONLY (OUTPATIENT)
Dept: SLEEP MEDICINE | Facility: CLINIC | Age: 78
End: 2023-06-30
Payer: COMMERCIAL

## 2023-06-30 DIAGNOSIS — G47.33 OBSTRUCTIVE SLEEP APNEA (ADULT) (PEDIATRIC): Primary | ICD-10-CM

## 2023-06-30 NOTE — PROGRESS NOTES
Patient was offered choice of vendor and chose [facility].  Patient Rodney Hendrickson was set up at [facility] on June 30, 2023. Patient received a Resmed Airsense 10 Pressures were set at 8-16 cm H2O.   Patient s ramp is 5 cm H2O for Auto and FLEX/EPR is EPR, 2.  Patient is not eligible for a new mask, but does want to continue using his [employer] Eson 2 size medium nasal mask, heated tubing and heated humidifier.  Patient has the following compliance requirements: using and visit requirements  Patient has a follow up on 07/31/23 with Paulette Garcia CNP.    Ivone Rodrigez

## 2023-07-25 DIAGNOSIS — M1A.0790 CHRONIC IDIOPATHIC GOUT INVOLVING TOE WITHOUT TOPHUS, UNSPECIFIED LATERALITY: ICD-10-CM

## 2023-07-26 RX ORDER — COLCHICINE 0.6 MG/1
0.6 TABLET ORAL DAILY
Qty: 5 TABLET | Refills: 3 | Status: SHIPPED | OUTPATIENT
Start: 2023-07-26 | End: 2024-03-06

## 2023-07-26 NOTE — TELEPHONE ENCOUNTER
Provider approval needed     Creatinine   Date Value Ref Range Status   02/24/2023 1.66 (H) 0.67 - 1.17 mg/dL Final   04/14/2021 1.52 (H) 0.66 - 1.25 mg/dL Final      Uric Acid   Date Value Ref Range Status   02/24/2023 7.3 (H) 3.4 - 7.0 mg/dL Final

## 2023-07-31 ENCOUNTER — OFFICE VISIT (OUTPATIENT)
Dept: SLEEP MEDICINE | Facility: CLINIC | Age: 78
End: 2023-07-31
Payer: COMMERCIAL

## 2023-07-31 VITALS
BODY MASS INDEX: 37.36 KG/M2 | SYSTOLIC BLOOD PRESSURE: 156 MMHG | HEART RATE: 62 BPM | DIASTOLIC BLOOD PRESSURE: 80 MMHG | WEIGHT: 219.38 LBS | OXYGEN SATURATION: 97 %

## 2023-07-31 DIAGNOSIS — G47.33 OSA (OBSTRUCTIVE SLEEP APNEA): ICD-10-CM

## 2023-07-31 DIAGNOSIS — G47.33 OBSTRUCTIVE SLEEP APNEA (ADULT) (PEDIATRIC): Primary | ICD-10-CM

## 2023-07-31 PROCEDURE — 99213 OFFICE O/P EST LOW 20 MIN: CPT | Performed by: NURSE PRACTITIONER

## 2023-07-31 ASSESSMENT — SLEEP AND FATIGUE QUESTIONNAIRES
HOW LIKELY ARE YOU TO NOD OFF OR FALL ASLEEP WHILE WATCHING TV: HIGH CHANCE OF DOZING
HOW LIKELY ARE YOU TO NOD OFF OR FALL ASLEEP WHILE SITTING QUIETLY AFTER LUNCH WITHOUT ALCOHOL: SLIGHT CHANCE OF DOZING
HOW LIKELY ARE YOU TO NOD OFF OR FALL ASLEEP WHILE SITTING INACTIVE IN A PUBLIC PLACE: HIGH CHANCE OF DOZING
HOW LIKELY ARE YOU TO NOD OFF OR FALL ASLEEP WHILE SITTING AND TALKING TO SOMEONE: SLIGHT CHANCE OF DOZING
HOW LIKELY ARE YOU TO NOD OFF OR FALL ASLEEP IN A CAR, WHILE STOPPED FOR A FEW MINUTES IN TRAFFIC: HIGH CHANCE OF DOZING
HOW LIKELY ARE YOU TO NOD OFF OR FALL ASLEEP WHILE SITTING AND READING: HIGH CHANCE OF DOZING
HOW LIKELY ARE YOU TO NOD OFF OR FALL ASLEEP WHEN YOU ARE A PASSENGER IN A CAR FOR AN HOUR WITHOUT A BREAK: HIGH CHANCE OF DOZING
HOW LIKELY ARE YOU TO NOD OFF OR FALL ASLEEP WHILE LYING DOWN TO REST IN THE AFTERNOON WHEN CIRCUMSTANCES PERMIT: HIGH CHANCE OF DOZING

## 2023-07-31 NOTE — PROGRESS NOTES
Obstructive Sleep Apnea - PAP Follow-Up Visit:    Chief Complaint   Patient presents with    CPAP Follow Up       Rodney Hendrickson comes in today for follow-up of their severe sleep apnea, managed with CPAP.   Patient last had follow-up visit 6/2/2020.  At that time his pressure settings were the same.  8-16 cm H2O.  Uses a ResMed PAP machine and and Eson 2 nasal mask.  He continues to change his mask every 2 weeks.  He does not use a chinstrap.  He is very mindful about his nasal mask leaking, and sleeps on his back.    Impression/Plan:  Obstructive Sleep Apnea   Will continue auto CPAP 8 to 16 cm H2O. he finds this to be very comfortable pressure, and does not want them changed.  DME for supplies with 1 year.  He Continues to [facility] as his DME provider.   Rodney should follow-up in his medicine clinic in 2 years, or sooner if needed   Recommend optimizing his including medicines to any opportunities for sleep intrusion   Recommend safe driving practices including not driving if he is drowsy    Do you use a CPAP Machine at home: Yes  Overall, on a scale of 0-10 how would you rate your CPAP (0 poor, 10 great):    Is your mask comfortable: Yes  If not, why:    Is you mask leaking: Yes  If yes, where do you feel it: nose  How many night per week does the mask leak (0-7): two  Do you notice snoring with mask on: No  Do you notice gasping arousals with mask on: No  Are you having significant oral or nasal dryness: Yes  Is the pressure setting comfortable: Yes  In not, why:    What type of mask do you use: Nasal Mask  What is your typical bedtime: ten  How long does it take you to go to sleep on PAP therapy: four min  What time do you typically get out of bed for the day: 530  How many hours on average per night are you using PAP therapy: 7  How many hours are you sleeping per night: 7  Do you feel well rested in the morning:      EPWORTH SLEEPINESS SCALE         7/31/2023     8:16 AM    [place]  Sleepiness Scale ( PANTERA Kellogg  1079-0844<br>ESS - [place]/English - Final version - 21 Nov 07 - [facility] [place].)   Sitting and reading High chance of dozing   Watching TV High chance of dozing   Sitting, inactive in a public place (e.g. a theatre or a meeting) High chance of dozing   As a passenger in a car for an hour without a break High chance of dozing   Lying down to rest in the afternoon when circumstances permit High chance of dozing   Sitting and talking to someone Slight chance of dozing   Sitting quietly after a lunch without alcohol Slight chance of dozing   In a car, while stopped for a few minutes in traffic High chance of dozing   [place] Score (MC) 20   [place] Score (Sleep) 20       INSOMNIA SEVERITY INDEX (KRISTIE)          7/31/2023     8:09 AM   Insomnia Severity Index (KRISTIE)   Difficulty falling asleep 1   Difficulty staying asleep 0   Problems waking up too early 1   How SATISFIED/DISSATISFIED are you with your CURRENT sleep pattern? 2   How NOTICEABLE to others do you think your sleep problem is in terms of impairing the quality of your life? 1   How WORRIED/DISTRESSED are you about your current sleep problem? 2   To what extent do you consider your sleep problem to INTERFERE with your daily functioning (e.g. daytime fatigue, mood, ability to function at work/daily chores, concentration, memory, mood, etc.) CURRENTLY? 2   KRISTIE Total Score 9       Guidelines for Scoring/Interpretation:  Total score categories:  0-7 = No clinically significant insomnia   8-14 = Subthreshold insomnia   15-21 = Clinical insomnia (moderate severity)  22-28 = Clinical insomnia (severe)  Used via courtesy of www.[employer] Re[employer] Managementth.va.gov with permission from Leon Ocampo PhD., [facility]    Previous Home Study Results:  4/12/17 (wt 197 lbs)  AHI 25.5/hr (supine 65.8/hr due to limited time of supine sleep)  Lowest O 2 saturation is 71%. Time spent O2 saturation below 88% was 16.1 minutes       ResMed   Auto-PAP 8.0 -  16.0 cmH2O 30 day usage data:    96% of days with > 4 hours of use. 0/30 days with no use.   Average use 482 minutes per day.   95%ile Leak 7.16 L/min.   CPAP 95% pressure 14 cm.   AHI 1.72 events per hour.           Reviewed by team:  Tobacco  Allergies  Meds  Problems           Reviewed by provider:   Allergies  Meds  Problems             Problem List:  Patient Active Problem List    Diagnosis Date Noted    Diabetes mellitus, type 2 (H) 06/02/2023     Priority: Medium    CKD (chronic kidney disease) stage 3, GFR 30-59 ml/min (H) 12/19/2019     Priority: Medium    DOMINIK (obstructive sleep apnea) 05/23/2017     Priority: Medium    Benign non-nodular prostatic hyperplasia with lower urinary tract symptoms 03/15/2017     Priority: Medium    Morbid obesity due to excess calories (H) 03/15/2017     Priority: Medium    Hyperlipidemia LDL goal <130      Priority: Medium    Hypertension      Priority: Medium          BP (!) 156/80   Pulse 62   Wt 99.5 kg (219 lb 6 oz)   SpO2 97%   BMI 37.36 kg/m      Impression/Plan:  Severe Obstructive Sleep Apnea              Will continue auto CPAP 8 to 16 cm H2O.  DME for supplies with 1 year.  He Continues to Use [employer] as his DME provider.  He uses a nasal mask              Luce should follow-up in his medicine clinic in 2 years, or sooner if needed              Recommend optimizing his including medicines to any opportunities for sleep intrusion              Recommend safe driving practices including not driving if he is drowsy           30 minutes spent with patient, all of which were spent face-to-face counseling, consulting, coordinating plan of care.         MAXIMILIANO Dorantes CNP  Sleep Medicine    This note was written with the assistance of the Dragon voice-dictation technology software. The final document, although reviewed, may contain errors. For corrections, please contact the office.      CC:  Brennan Mercado,

## 2023-07-31 NOTE — NURSING NOTE
"Chief Complaint   Patient presents with    CPAP Follow Up       Initial BP (!) 156/80   Pulse 62   Wt 99.5 kg (219 lb 6 oz)   SpO2 97%   BMI 37.36 kg/m   Estimated body mass index is 37.36 kg/m  as calculated from the following:    Height as of 6/2/23: 1.632 m (5' 4.25\").    Weight as of this encounter: 99.5 kg (219 lb 6 oz).    Medication Reconciliation: complete    Neck circumference:     DME: [facility] [place]      "

## 2023-07-31 NOTE — PATIENT INSTRUCTIONS
Drive Safe... Drive Alive     Sleep health profoundly affects your health, mood, and your safety. 33% of the population (one in three of us) is not getting enough sleep and many have a sleep disorder. Not getting enough sleep or having an untreated / undertreated sleep condition may make us sleepy without even knowing it. In fact, our driving could be dramatically impaired due to our sleep health. As your provider, here are some things I would like you to know about driving:     Here are some warning signs for impairment and dangerous drowsy driving:              -Having been awake more than 16 hours               -Looking tired               -Eyelid drooping              -Head nodding (it could be too late at this point)              -Driving for more than 30 minutes     Some things you could do to make the driving safer if you are experiencing some drowsiness:              -Stop driving and rest              -Call for transportation              -Make sure your sleep disorder is adequately treated     Some things that have been shown NOT to work when experiencing drowsiness while driving:              -Turning on the radio              -Opening windows              -Eating any  distracting  /  entertaining  foods (e.g., sunflower seeds, candy, or any other)              -Talking on the phone      Your decision may not only impact your life, but also the life of others. Please, remember to drive safe for yourself and all of us. Your BMI is Body mass index is 37.36 kg/m .    What is BMI?  Body mass index (BMI) is one way to tell whether you are at a healthy weight, overweight, or obese. It measures your weight in relation to your height.  A BMI of 18.5 to 24.9 is in the healthy range. A person with a BMI of 25 to 29.9 is considered overweight, and someone with a BMI of 30 or greater is considered obese.  Another way to find out if you are at risk for health problems caused by overweight and obesity is to measure  your waist. If you are a woman and your waist is more than 35 inches, or if you are a man and your waist is more than 40 inches, your risk of disease may be higher.  More than two-thirds of American adults are considered overweight or obese. Being overweight or obese increases the risk for further weight gain.  Excess weight may lead to heart disease and diabetes. Creating and following plans for healthy eating and physical activity may help you improve your health.    Methods for maintaining or losing weight.  Weight control is part of healthy lifestyle and includes exercise, emotional health, and healthy eating habits.  Careful eating habits lifelong is the mainstay of weight control.  Though there are significant health benefits from weight loss, long-term weight loss with diet alone may be very difficult to achieve- studies show long-term success with dietary management in less than 10% of people. Attaining a healthy weight may be especially difficult to achieve in those with severe obesity. In some cases, medications, devices and surgical management might be considered.    What can you do?  If you are overweight or obese and are interested in methods for weight loss, you should discuss this with your provider. In addition, we recommend that you review healthy life styles and methods for weight loss available through the National Institutes of Health patient information sites:   http://win.niddk.nih.gov/publications/index.htm         Your Body mass index is 37.36 kg/m .  Weight management is a personal decision.  If you are interested in exploring weight loss strategies, the following discussion covers the approaches that may be successful. Body mass index (BMI) is one way to tell whether you are at a healthy weight, overweight, or obese. It measures your weight in relation to your height.  A BMI of 18.5 to 24.9 is in the healthy range. A person with a BMI of 25 to 29.9 is considered overweight, and someone with  a BMI of 30 or greater is considered obese. More than two-thirds of American adults are considered overweight or obese.  Being overweight or obese increases the risk for further weight gain. Excess weight may lead to heart disease and diabetes.  Creating and following plans for healthy eating and physical activity may help you improve your health.  Weight control is part of healthy lifestyle and includes exercise, emotional health, and healthy eating habits. Careful eating habits lifelong are the mainstay of weight control. Though there are significant health benefits from weight loss, long-term weight loss with diet alone may be very difficult to achieve- studies show long-term success with dietary management in less than 10% of people. Attaining a healthy weight may be especially difficult to achieve in those with severe obesity. In some cases, medications, devices and surgical management might be considered.  What can you do?  If you are overweight or obese and are interested in methods for weight loss, you should discuss this with your provider.   Consider reducing daily calorie intake by 500 calories.   Keep a food journal.   Avoiding skipping meals, consider cutting portions instead.    Diet combined with exercise helps maintain muscle while optimizing fat loss. Strength training is particularly important for building and maintaining muscle mass. Exercise helps reduce stress, increase energy, and improves fitness. Increasing exercise without diet control, however, may not burn enough calories to loose weight.     Start walking three days a week 10-20 minutes at a time  Work towards walking thirty minutes five days a week   Eventually, increase the speed of your walking for 1-2 minutes at time    In addition, we recommend that you review healthy lifestyles and methods for weight loss available through the National Institutes of Health patient information  sites:  http://win.niddk.nih.gov/publications/index.htm    And look into health and wellness programs that may be available through your health insurance provider, employer, local community center, or [employer].

## 2023-07-31 NOTE — NURSING NOTE
Return in about 2 year reminder created and routed to  .       Galina Romeo WILMER  [facility]

## 2023-09-07 ENCOUNTER — LAB (OUTPATIENT)
Dept: LAB | Facility: CLINIC | Age: 78
End: 2023-09-07
Payer: COMMERCIAL

## 2023-09-07 DIAGNOSIS — E11.9 TYPE 2 DIABETES MELLITUS WITHOUT COMPLICATION, WITHOUT LONG-TERM CURRENT USE OF INSULIN (H): ICD-10-CM

## 2023-09-07 LAB — HBA1C MFR BLD: 7 % (ref 0–5.6)

## 2023-09-07 PROCEDURE — 83036 HEMOGLOBIN GLYCOSYLATED A1C: CPT

## 2023-09-07 PROCEDURE — 36415 COLL VENOUS BLD VENIPUNCTURE: CPT

## 2023-12-02 DIAGNOSIS — M1A.0790 CHRONIC IDIOPATHIC GOUT INVOLVING TOE WITHOUT TOPHUS, UNSPECIFIED LATERALITY: ICD-10-CM

## 2023-12-04 RX ORDER — ALLOPURINOL 100 MG/1
100 TABLET ORAL DAILY
Qty: 90 TABLET | Refills: 0 | Status: SHIPPED | OUTPATIENT
Start: 2023-12-04 | End: 2024-03-06

## 2023-12-20 NOTE — TELEPHONE ENCOUNTER
Last OV 4/1/19  Pt has not had labs done. Provider approval needed.       Recent Labs   Lab Test 06/26/18  0752 05/23/17  1150   CHOL 158 175   HDL 42 50   LDL 88 102*   TRIG 142 115     Potassium   Date Value Ref Range Status   06/26/2018 4.0 3.4 - 5.3 mmol/L Final     Creatinine   Date Value Ref Range Status   06/26/2018 1.34 (H) 0.66 - 1.25 mg/dL Final       
no

## 2024-02-02 DIAGNOSIS — M1A.0790 CHRONIC IDIOPATHIC GOUT INVOLVING TOE WITHOUT TOPHUS, UNSPECIFIED LATERALITY: ICD-10-CM

## 2024-02-02 DIAGNOSIS — M10.079 ACUTE IDIOPATHIC GOUT OF FOOT, UNSPECIFIED LATERALITY: ICD-10-CM

## 2024-02-02 RX ORDER — PREDNISONE 10 MG/1
TABLET ORAL
Qty: 20 TABLET | Refills: 0 | Status: SHIPPED | OUTPATIENT
Start: 2024-02-02 | End: 2024-05-08

## 2024-02-02 NOTE — TELEPHONE ENCOUNTER
Patient calls today saying he is having a gout flare and the colchicine is not helping this time. He is asking for prednisone       Patient call back number [phone]

## 2024-03-06 DIAGNOSIS — M1A.0790 CHRONIC IDIOPATHIC GOUT INVOLVING TOE WITHOUT TOPHUS, UNSPECIFIED LATERALITY: ICD-10-CM

## 2024-03-06 RX ORDER — ALLOPURINOL 100 MG/1
100 TABLET ORAL DAILY
Qty: 90 TABLET | Refills: 0 | Status: SHIPPED | OUTPATIENT
Start: 2024-03-06 | End: 2024-05-08

## 2024-03-06 RX ORDER — COLCHICINE 0.6 MG/1
0.6 TABLET ORAL DAILY
Qty: 5 TABLET | Refills: 3 | Status: SHIPPED | OUTPATIENT
Start: 2024-03-06 | End: 2024-06-13

## 2024-03-13 DIAGNOSIS — N40.1 BENIGN NON-NODULAR PROSTATIC HYPERPLASIA WITH LOWER URINARY TRACT SYMPTOMS: ICD-10-CM

## 2024-03-13 DIAGNOSIS — N40.1 BENIGN PROSTATIC HYPERPLASIA WITH URINARY FREQUENCY: ICD-10-CM

## 2024-03-13 DIAGNOSIS — R35.0 BENIGN PROSTATIC HYPERPLASIA WITH URINARY FREQUENCY: ICD-10-CM

## 2024-03-13 DIAGNOSIS — E78.5 HYPERLIPIDEMIA LDL GOAL <130: ICD-10-CM

## 2024-03-13 DIAGNOSIS — I10 BENIGN ESSENTIAL HYPERTENSION: ICD-10-CM

## 2024-03-13 DIAGNOSIS — K21.9 GASTROESOPHAGEAL REFLUX DISEASE WITHOUT ESOPHAGITIS: ICD-10-CM

## 2024-03-13 RX ORDER — ATORVASTATIN CALCIUM 40 MG/1
40 TABLET, FILM COATED ORAL DAILY
Qty: 90 TABLET | Refills: 0 | Status: SHIPPED | OUTPATIENT
Start: 2024-03-13 | End: 2024-05-08

## 2024-03-13 RX ORDER — TAMSULOSIN HYDROCHLORIDE 0.4 MG/1
CAPSULE ORAL
Qty: 90 CAPSULE | Refills: 0 | Status: SHIPPED | OUTPATIENT
Start: 2024-03-13 | End: 2024-05-08

## 2024-03-13 RX ORDER — OMEPRAZOLE 40 MG/1
CAPSULE, DELAYED RELEASE ORAL
Qty: 90 CAPSULE | Refills: 0 | Status: SHIPPED | OUTPATIENT
Start: 2024-03-13 | End: 2024-05-08

## 2024-03-13 RX ORDER — FINASTERIDE 5 MG/1
5 TABLET, FILM COATED ORAL DAILY
Qty: 90 TABLET | Refills: 0 | Status: SHIPPED | OUTPATIENT
Start: 2024-03-13 | End: 2024-05-08

## 2024-03-13 RX ORDER — LISINOPRIL AND HYDROCHLOROTHIAZIDE 12.5; 2 MG/1; MG/1
2 TABLET ORAL DAILY
Qty: 180 TABLET | Refills: 0 | Status: SHIPPED | OUTPATIENT
Start: 2024-03-13 | End: 2024-05-08

## 2024-05-01 ENCOUNTER — CARE COORDINATION (OUTPATIENT)
Dept: SLEEP MEDICINE | Facility: CLINIC | Age: 79
End: 2024-05-01
Payer: COMMERCIAL

## 2024-05-06 NOTE — TELEPHONE ENCOUNTER
Routing refill request to provider for review/approval because:  Failed protocol.  Jennifer HutsonRN BSN  [facility]  [phone]           2 = A lot of assistance

## 2024-05-08 ENCOUNTER — OFFICE VISIT (OUTPATIENT)
Dept: INTERNAL MEDICINE | Facility: CLINIC | Age: 79
End: 2024-05-08
Payer: COMMERCIAL

## 2024-05-08 VITALS
TEMPERATURE: 97.2 F | HEIGHT: 64 IN | DIASTOLIC BLOOD PRESSURE: 70 MMHG | RESPIRATION RATE: 20 BRPM | WEIGHT: 217.2 LBS | SYSTOLIC BLOOD PRESSURE: 130 MMHG | BODY MASS INDEX: 37.08 KG/M2 | OXYGEN SATURATION: 96 % | HEART RATE: 81 BPM

## 2024-05-08 DIAGNOSIS — N40.1 BENIGN NON-NODULAR PROSTATIC HYPERPLASIA WITH LOWER URINARY TRACT SYMPTOMS: ICD-10-CM

## 2024-05-08 DIAGNOSIS — I10 PRIMARY HYPERTENSION: ICD-10-CM

## 2024-05-08 DIAGNOSIS — Z23 NEED FOR SHINGLES VACCINE: ICD-10-CM

## 2024-05-08 DIAGNOSIS — I10 BENIGN ESSENTIAL HYPERTENSION: ICD-10-CM

## 2024-05-08 DIAGNOSIS — M1A.0791 IDIOPATHIC CHRONIC GOUT OF FOOT WITH TOPHUS, UNSPECIFIED LATERALITY: ICD-10-CM

## 2024-05-08 DIAGNOSIS — R35.0 BENIGN PROSTATIC HYPERPLASIA WITH URINARY FREQUENCY: ICD-10-CM

## 2024-05-08 DIAGNOSIS — K21.9 GASTROESOPHAGEAL REFLUX DISEASE WITHOUT ESOPHAGITIS: ICD-10-CM

## 2024-05-08 DIAGNOSIS — Z00.00 ENCOUNTER FOR PREVENTATIVE ADULT HEALTH CARE EXAMINATION: Primary | ICD-10-CM

## 2024-05-08 DIAGNOSIS — M1A.0790 CHRONIC IDIOPATHIC GOUT INVOLVING TOE WITHOUT TOPHUS, UNSPECIFIED LATERALITY: ICD-10-CM

## 2024-05-08 DIAGNOSIS — N40.1 BENIGN PROSTATIC HYPERPLASIA WITH URINARY FREQUENCY: ICD-10-CM

## 2024-05-08 DIAGNOSIS — G47.33 OSA (OBSTRUCTIVE SLEEP APNEA): ICD-10-CM

## 2024-05-08 DIAGNOSIS — E78.5 HYPERLIPIDEMIA LDL GOAL <130: ICD-10-CM

## 2024-05-08 DIAGNOSIS — E11.9 TYPE 2 DIABETES MELLITUS WITHOUT COMPLICATION, WITHOUT LONG-TERM CURRENT USE OF INSULIN (H): ICD-10-CM

## 2024-05-08 DIAGNOSIS — Z12.5 SCREENING FOR PROSTATE CANCER: ICD-10-CM

## 2024-05-08 DIAGNOSIS — N18.31 STAGE 3A CHRONIC KIDNEY DISEASE (H): ICD-10-CM

## 2024-05-08 LAB
ALBUMIN UR-MCNC: NEGATIVE MG/DL
APPEARANCE UR: CLEAR
BACTERIA #/AREA URNS HPF: ABNORMAL /HPF
BILIRUB UR QL STRIP: NEGATIVE
COLOR UR AUTO: YELLOW
ERYTHROCYTE [DISTWIDTH] IN BLOOD BY AUTOMATED COUNT: 12.1 % (ref 10–15)
GLUCOSE UR STRIP-MCNC: NEGATIVE MG/DL
HBA1C MFR BLD: 7.1 % (ref 0–5.6)
HCT VFR BLD AUTO: 38.1 % (ref 40–53)
HGB BLD-MCNC: 13 G/DL (ref 13.3–17.7)
HGB UR QL STRIP: NEGATIVE
KETONES UR STRIP-MCNC: NEGATIVE MG/DL
LEUKOCYTE ESTERASE UR QL STRIP: NEGATIVE
MCH RBC QN AUTO: 32 PG (ref 26.5–33)
MCHC RBC AUTO-ENTMCNC: 34.1 G/DL (ref 31.5–36.5)
MCV RBC AUTO: 94 FL (ref 78–100)
MUCOUS THREADS #/AREA URNS LPF: PRESENT /LPF
NITRATE UR QL: NEGATIVE
PH UR STRIP: 6 [PH] (ref 5–7)
PLATELET # BLD AUTO: 200 10E3/UL (ref 150–450)
RBC # BLD AUTO: 4.06 10E6/UL (ref 4.4–5.9)
RBC #/AREA URNS AUTO: ABNORMAL /HPF
SP GR UR STRIP: 1.01 (ref 1–1.03)
SQUAMOUS #/AREA URNS AUTO: ABNORMAL /LPF
UROBILINOGEN UR STRIP-ACNC: 0.2 E.U./DL
WBC # BLD AUTO: 7.7 10E3/UL (ref 4–11)
WBC #/AREA URNS AUTO: ABNORMAL /HPF

## 2024-05-08 PROCEDURE — 81001 URINALYSIS AUTO W/SCOPE: CPT | Performed by: INTERNAL MEDICINE

## 2024-05-08 PROCEDURE — 83036 HEMOGLOBIN GLYCOSYLATED A1C: CPT | Performed by: INTERNAL MEDICINE

## 2024-05-08 PROCEDURE — 85027 COMPLETE CBC AUTOMATED: CPT | Performed by: INTERNAL MEDICINE

## 2024-05-08 PROCEDURE — 82043 UR ALBUMIN QUANTITATIVE: CPT | Performed by: INTERNAL MEDICINE

## 2024-05-08 PROCEDURE — G0103 PSA SCREENING: HCPCS | Performed by: INTERNAL MEDICINE

## 2024-05-08 PROCEDURE — 80061 LIPID PANEL: CPT | Performed by: INTERNAL MEDICINE

## 2024-05-08 PROCEDURE — 82570 ASSAY OF URINE CREATININE: CPT | Performed by: INTERNAL MEDICINE

## 2024-05-08 PROCEDURE — 80053 COMPREHEN METABOLIC PANEL: CPT | Performed by: INTERNAL MEDICINE

## 2024-05-08 PROCEDURE — 84439 ASSAY OF FREE THYROXINE: CPT | Performed by: INTERNAL MEDICINE

## 2024-05-08 PROCEDURE — G0439 PPPS, SUBSEQ VISIT: HCPCS | Performed by: INTERNAL MEDICINE

## 2024-05-08 PROCEDURE — 84443 ASSAY THYROID STIM HORMONE: CPT | Performed by: INTERNAL MEDICINE

## 2024-05-08 PROCEDURE — 99214 OFFICE O/P EST MOD 30 MIN: CPT | Mod: 25 | Performed by: INTERNAL MEDICINE

## 2024-05-08 PROCEDURE — 84550 ASSAY OF BLOOD/URIC ACID: CPT | Performed by: INTERNAL MEDICINE

## 2024-05-08 PROCEDURE — 36415 COLL VENOUS BLD VENIPUNCTURE: CPT | Performed by: INTERNAL MEDICINE

## 2024-05-08 RX ORDER — ALLOPURINOL 100 MG/1
100 TABLET ORAL DAILY
Qty: 90 TABLET | Refills: 3 | Status: SHIPPED | OUTPATIENT
Start: 2024-05-08

## 2024-05-08 RX ORDER — FINASTERIDE 5 MG/1
5 TABLET, FILM COATED ORAL DAILY
Qty: 90 TABLET | Refills: 3 | Status: SHIPPED | OUTPATIENT
Start: 2024-05-08

## 2024-05-08 RX ORDER — ATORVASTATIN CALCIUM 40 MG/1
40 TABLET, FILM COATED ORAL DAILY
Qty: 90 TABLET | Refills: 3 | Status: SHIPPED | OUTPATIENT
Start: 2024-05-08

## 2024-05-08 RX ORDER — LISINOPRIL AND HYDROCHLOROTHIAZIDE 12.5; 2 MG/1; MG/1
2 TABLET ORAL DAILY
Qty: 180 TABLET | Refills: 0 | Status: SHIPPED | OUTPATIENT
Start: 2024-05-08 | End: 2024-08-15

## 2024-05-08 RX ORDER — TAMSULOSIN HYDROCHLORIDE 0.4 MG/1
CAPSULE ORAL
Qty: 90 CAPSULE | Refills: 3 | Status: SHIPPED | OUTPATIENT
Start: 2024-05-08

## 2024-05-08 RX ORDER — OMEPRAZOLE 40 MG/1
CAPSULE, DELAYED RELEASE ORAL
Qty: 90 CAPSULE | Refills: 3 | Status: SHIPPED | OUTPATIENT
Start: 2024-05-08

## 2024-05-08 SDOH — HEALTH STABILITY: PHYSICAL HEALTH: ON AVERAGE, HOW MANY DAYS PER WEEK DO YOU ENGAGE IN MODERATE TO STRENUOUS EXERCISE (LIKE A BRISK WALK)?: 5 DAYS

## 2024-05-08 ASSESSMENT — SOCIAL DETERMINANTS OF HEALTH (SDOH): HOW OFTEN DO YOU GET TOGETHER WITH FRIENDS OR RELATIVES?: ONCE A WEEK

## 2024-05-08 ASSESSMENT — PAIN SCALES - GENERAL: PAINLEVEL: NO PAIN (1)

## 2024-05-08 NOTE — LETTER
May 10, 2024      Rodney Hendrickson  [id number] [facility] [id number]        Dear ,    We are writing to inform you of your test results.    Slightly decreased kidney function and mild anemia. Controlled diabetes, but slightly worsened. Try to improve diet and exercise. Follow up in 6 months.     Resulted Orders   Albumin Random Urine Quantitative with Creat Ratio   Result Value Ref Range    Creatinine Urine mg/dL 104.0 mg/dL      Comment:      The reference ranges have not been established in urine creatinine. The results should be integrated into the clinical context for interpretation.    Albumin Urine mg/L <12.0 mg/L      Comment:      The reference ranges have not been established in urine albumin. The results should be integrated into the clinical context for interpretation.    Albumin Urine mg/g Cr        Comment:      Unable to calculate, urine albumin and/or urine creatinine is outside detectable limits.  Microalbuminuria is defined as an albumin:creatinine ratio of 17 to 299 for males and 25 to 299 for females. A ratio of albumin:creatinine of 300 or higher is indicative of overt proteinuria.  Due to biologic variability, positive results should be confirmed by a second, first-morning random or 24-hour timed urine specimen. If there is discrepancy, a third specimen is recommended. When 2 out of 3 results are in the microalbuminuria range, this is evidence for incipient nephropathy and warrants increased efforts at glucose control, blood pressure control, and institution of therapy with an angiotensin-converting-enzyme (ACE) inhibitor (if the patient can tolerate it).     HEMOGLOBIN A1C   Result Value Ref Range    Hemoglobin A1C 7.1 (H) 0.0 - 5.6 %      Comment:      Normal <5.7%   Prediabetes 5.7-6.4%    Diabetes 6.5% or higher     Note: Adopted from ADA consensus guidelines.   Lipid panel reflex to direct LDL Non-fasting   Result Value Ref Range    Cholesterol 158 <200 mg/dL     Triglycerides 154 (H) <150 mg/dL    Direct Measure HDL 38 (L) >=40 mg/dL    LDL Cholesterol Calculated 89 <=100 mg/dL    Non HDL Cholesterol 120 <130 mg/dL    Patient Fasting > 8hrs? Yes     Narrative    Cholesterol  Desirable:  <200 mg/dL    Triglycerides  Normal:  Less than 150 mg/dL  Borderline High:  150-199 mg/dL  High:  200-499 mg/dL  Very High:  Greater than or equal to 500 mg/dL    Direct Measure HDL  Female:  Greater than or equal to 50 mg/dL   Male:  Greater than or equal to 40 mg/dL    LDL Cholesterol  Desirable:  <100mg/dL  Above Desirable:  100-129 mg/dL   Borderline High:  130-159 mg/dL   High:  160-189 mg/dL   Very High:  >= 190 mg/dL    Non HDL Cholesterol  Desirable:  130 mg/dL  Above Desirable:  130-159 mg/dL  Borderline High:  160-189 mg/dL  High:  190-219 mg/dL  Very High:  Greater than or equal to 220 mg/dL   CBC with platelets   Result Value Ref Range    WBC Count 7.7 4.0 - 11.0 10e3/uL    RBC Count 4.06 (L) 4.40 - 5.90 10e6/uL    Hemoglobin 13.0 (L) 13.3 - 17.7 g/dL    Hematocrit 38.1 (L) 40.0 - 53.0 %    MCV 94 78 - 100 fL    MCH 32.0 26.5 - 33.0 pg    MCHC 34.1 31.5 - 36.5 g/dL    RDW 12.1 10.0 - 15.0 %    Platelet Count 200 150 - 450 10e3/uL   Comprehensive metabolic panel (BMP + Alb, Alk Phos, ALT, AST, Total. Bili, TP)   Result Value Ref Range    Sodium 139 135 - 145 mmol/L      Comment:      Reference intervals for this test were updated on 09/26/2023 to more accurately reflect our healthy population. There may be differences in the flagging of prior results with similar values performed with this method. Interpretation of those prior results can be made in the context of the updated reference intervals.     Potassium 4.5 3.4 - 5.3 mmol/L    Carbon Dioxide (CO2) 26 22 - 29 mmol/L    Anion Gap 13 7 - 15 mmol/L    Urea Nitrogen 32.4 (H) 8.0 - 23.0 mg/dL    Creatinine 1.77 (H) 0.67 - 1.17 mg/dL    GFR Estimate 39 (L) >60 mL/min/1.73m2    Calcium 10.1 8.8 - 10.2 mg/dL    Chloride 100 98 -  107 mmol/L    Glucose 159 (H) 70 - 99 mg/dL    Alkaline Phosphatase 89 40 - 150 U/L      Comment:      Reference intervals for this test were updated on 11/14/2023 to more accurately reflect our healthy population. There may be differences in the flagging of prior results with similar values performed with this method. Interpretation of those prior results can be made in the context of the updated reference intervals.    AST 26 0 - 45 U/L      Comment:      Reference intervals for this test were updated on 6/12/2023 to more accurately reflect our healthy population. There may be differences in the flagging of prior results with similar values performed with this method. Interpretation of those prior results can be made in the context of the updated reference intervals.    ALT 31 0 - 70 U/L      Comment:      Reference intervals for this test were updated on 6/12/2023 to more accurately reflect our healthy population. There may be differences in the flagging of prior results with similar values performed with this method. Interpretation of those prior results can be made in the context of the updated reference intervals.      Protein Total 7.5 6.4 - 8.3 g/dL    Albumin 4.3 3.5 - 5.2 g/dL    Bilirubin Total 0.4 <=1.2 mg/dL    Patient Fasting > 8hrs? Yes    TSH with free T4 reflex   Result Value Ref Range    TSH 5.69 (H) 0.30 - 4.20 uIU/mL   PSA, screen   Result Value Ref Range    Prostate Specific Antigen Screen 0.68 0.00 - 6.50 ng/mL    Narrative    This result is obtained using the Roche Elecsys total PSA method on the albert e801 immunoassay analyzer. Results obtained with different assay methods or kits cannot be used interchangeably.   UA with Microscopic reflex to Culture - lab collect   Result Value Ref Range    Color Urine Yellow Colorless, Straw, Light Yellow, Yellow    Appearance Urine Clear Clear    Glucose Urine Negative Negative mg/dL    Bilirubin Urine Negative Negative    Ketones Urine Negative Negative  mg/dL    Specific Gravity Urine 1.015 1.003 - 1.035    Blood Urine Negative Negative    pH Urine 6.0 5.0 - 7.0    Protein Albumin Urine Negative Negative mg/dL    Urobilinogen Urine 0.2 0.2, 1.0 E.U./dL    Nitrite Urine Negative Negative    Leukocyte Esterase Urine Negative Negative   Uric acid   Result Value Ref Range    Uric Acid 7.1 (H) 3.4 - 7.0 mg/dL   UA Microscopic with Reflex to Culture   Result Value Ref Range    Bacteria Urine Few (A) None Seen /HPF    RBC Urine 0-2 0-2 /HPF /HPF    WBC Urine 0-5 0-5 /HPF /HPF    Squamous Epithelials Urine Few (A) None Seen /LPF    Mucus Urine Present (A) None Seen /LPF    Narrative    Urine Culture not indicated   T4 free   Result Value Ref Range    Free T4 1.39 0.90 - 1.70 ng/dL       If you have any questions or concerns, please call the clinic at the number listed above.       Sincerely,      Brennan Mercado MD

## 2024-05-08 NOTE — PROGRESS NOTES
Preventive Care Visit  [facility]  Brennan Mercado MD, Internal Medicine  May 8, 2024      Assessment & Plan     Need for shingles vaccine    - zoster vaccine recombinant adjuvanted (SHINGRIX) injection; Inject 0.5 mLs into the muscle once for 1 dose Pharmacist administered    Type 2 diabetes mellitus without complication, without long-term current use of insulin (H)  Assess lab   - HEMOGLOBIN A1C  - Lipid panel reflex to direct LDL Non-fasting  - OFFICE/OUTPT VISIT,EST,LEVL III    Stage 3a chronic kidney disease (H)  Monitor renal function   - Albumin Random Urine Quantitative with Creat Ratio  - OFFICE/OUTPT VISIT,EST,LEVL III    Encounter for preventative adult health care examination  advised regular aerobic activity, low cholesterol, low salt diet, wearing seat belt,  self examinations, sunscreen protection.Obtain screening cholesterol, immunizations reviewed.    - zoster vaccine recombinant adjuvanted (SHINGRIX) injection; Inject 0.5 mLs into the muscle once for 1 dose Pharmacist administered  - CBC with platelets  - Comprehensive metabolic panel (BMP + Alb, Alk Phos, ALT, AST, Total. Bili, TP)  - TSH with free T4 reflex  - PSA, screen  - UA with Microscopic reflex to Culture - lab collect    DOMINIK (obstructive sleep apnea)  On CPAP     Hyperlipidemia LDL goal <130  Continue statin   - OFFICE/OUTPT VISIT,EST,LEVL III    Primary hypertension  Controlled, on treatment   - CBC with platelets  - Comprehensive metabolic panel (BMP + Alb, Alk Phos, ALT, AST, Total. 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    Weight as of this encounter: 98.5 kg (217 lb 3.2 oz).   Weight management plan: Discussed healthy diet and exercise guidelines    Counseling  Appropriate preventive services were discussed with this patient, including applicable screening as appropriate for fall prevention, nutrition, physical activity, Tobacco-use cessation, weight loss and cognition.  Checklist reviewing preventive services available has been given to the patient.  Reviewed patient's diet, addressing concerns and/or questions.   Discussed possible causes of fatigue.     See Patient Instructions    Ellie Stevens is a 78 year old, presenting for the following:  Wellness Visit        5/8/2024     8:38 AM   Additional Questions   Roomed by Tarah NARVAEZ   Accompanied by n/a         Health Care Directive  Patient does not have a Health Care Directive or Living Will: Patient states has Advance Directive and will bring in a copy to clinic.    HPI    Has H/O DM. On diet , exercise and oral treatment . Blood sugars are controlled. No parestesias. No hypoglycemias.  Has h/o HTN. on medical treatment. BP has been controlled. No side effects from medications. No CP, HA, dizziness. good compliance with medications and low salt diet.  Has H/O hyperlipidemia. On medical treatment and diet. No side effects. No muscle weakness, myalgias or upset stomach.   Has H/O BPH. On treatment with flomax and proscar . Has chronic symptoms of frequency, decreased urinary stream , no dysuria. No side effects from medications.  Has h/o gout, has had flare ups , on PRN Colchicine     Does patient need colonoscopy or endoscopy; also breathing is getting worse, should he see pulmonary?            5/8/2024   General Health   How would you rate " your overall physical health? Excellent   Feel stress (tense, anxious, or unable to sleep) To some extent   (!) STRESS CONCERN      5/8/2024   Nutrition   Diet: Regular (no restrictions)         5/8/2024   Exercise   Days per week of moderate/strenous exercise 5 days         5/8/2024   Social Factors   Frequency of gathering with friends or relatives Once a week   Worry food won't last until get money to buy more No   Food not last or not have enough money for food? No   Do you have housing?  Yes   Are you worried about losing your housing? No   Lack of transportation? No   Unable to get utilities (heat,electricity)? No         5/8/2024   Fall Risk   Fallen 2 or more times in the past year? Yes   Trouble with walking or balance? No   Gait Speed Test (Document in seconds) 3.39   Gait Speed Test Interpretation Less than or equal to 5.00 seconds - PASS          5/8/2024   Activities of Daily Living- Home Safety   Needs help with the following daily activites None of the above   Safety concerns in the home None of the above         5/8/2024   Dental   Dentist two times every year? Yes         5/8/2024   Hearing Screening   Hearing concerns? None of the above         5/8/2024   Driving Risk Screening   Patient/family members have concerns about driving No         5/8/2024   General Alertness/Fatigue Screening   Have you been more tired than usual lately? (!) YES         5/8/2024   Urinary Incontinence Screening   Bothered by leaking urine in past 6 months No         5/8/2024   TB Screening   Were you born outside of the [place]? No         Today's PHQ-2 Score:       5/8/2024     8:28 AM   PHQ-2 ( 1999 [employer])   Q1: Little interest or pleasure in doing things 0   Q2: Feeling down, depressed or hopeless 0   PHQ-2 Score 0   Q1: Little interest or pleasure in doing things Not at all   Q2: Feeling down, depressed or hopeless Not at all   PHQ-2 Score 0           5/8/2024   Substance Use   Alcohol more than 3/day or more than 7/wk  No   Do you have a current opioid prescription? No   How severe/bad is pain from 1 to 10? 0/10 (No Pain)   Do you use any other substances recreationally? No     Social History     Tobacco Use    Smoking status: Former     Current packs/day: 0.00     Types: Cigarettes     Quit date: 1983     Years since quittin.0    Smokeless tobacco: Never   Vaping Use    Vaping status: Never Used   Substance Use Topics    Alcohol use: Yes     Comment: once every couple monthly    Drug use: No       ASCVD Risk   The 10-year ASCVD risk score (Nidhi JASMINE, et al., 2019) is: 71%    Values used to calculate the score:      Age: 78 years      Sex: Male      Is Non- : No      Diabetic: Yes      Tobacco smoker: No      Systolic Blood Pressure: 160 mmHg      Is BP treated: Yes      HDL Cholesterol: 38 mg/dL      Total Cholesterol: 179 mg/dL            Reviewed and updated as needed this visit by Provider                    Lab work is in process  Labs reviewed in EPIC  Current providers sharing in care for this patient include:  Patient Care Team:  Brennan Mercado MD as PCP - General (Internal Medicine)  Brennan Mercado MD as Assigned PCP  Paulette Garcia APRN CNP as Assigned Pulmonology Provider    The following health maintenance items are reviewed in Epic and correct as of today:  Health Maintenance   Topic Date Due    DIABETIC FOOT EXAM  Never done    EYE EXAM  Never done    MICROALBUMIN  2023    A1C  2023    COVID-19 Vaccine ( season) 2024    BMP  2024    LIPID  2024    MEDICARE ANNUAL WELLNESS VISIT  2024    ANNUAL REVIEW OF HM ORDERS  2024    ZOSTER IMMUNIZATION (2 of 2) 2024    HEMOGLOBIN  2024    FALL RISK ASSESSMENT  2025    ADVANCE CARE PLANNING  2028    DTAP/TDAP/TD IMMUNIZATION (3 - Td or Tdap) 2032    HEPATITIS C SCREENING  Completed    PHQ-2 (once per calendar year)  Completed     "INFLUENZA VACCINE  Completed    Pneumococcal Vaccine: 65+ Years  Completed    URINALYSIS  Completed    RSV VACCINE (Pregnancy & 60+)  Completed    IPV IMMUNIZATION  Aged Out    HPV IMMUNIZATION  Aged Out    MENINGITIS IMMUNIZATION  Aged Out    RSV MONOCLONAL ANTIBODY  Aged Out    COLORECTAL CANCER SCREENING  Discontinued         Review of Systems  Constitutional, HEENT, cardiovascular, pulmonary, GI, , musculoskeletal, neuro, skin, endocrine and psych systems are negative, except as otherwise noted.     Objective    Exam  BP (!) 160/84 (BP Location: Left arm, Cuff Size: Adult Regular)   Pulse 81   Temp 97.2  F (36.2  C) (Tympanic)   Resp 20   Ht 1.632 m (5' 4.25\")   Wt 98.5 kg (217 lb 3.2 oz)   SpO2 96%   BMI 36.99 kg/m     Estimated body mass index is 36.99 kg/m  as calculated from the following:    Height as of this encounter: 1.632 m (5' 4.25\").    Weight as of this encounter: 98.5 kg (217 lb 3.2 oz).    Physical Exam  GENERAL: alert and no distress, obese  EYES: Eyes grossly normal to inspection, PERRL and conjunctivae and sclerae normal  HENT: ear canals and TM's normal, nose and mouth without ulcers or lesions  NECK: no adenopathy, no asymmetry, masses, or scars  RESP: lungs clear to auscultation - no rales, rhonchi or wheezes  CV: regular rate and rhythm, normal S1 S2, no S3 or S4, no murmur, click or rub, no peripheral edema  ABDOMEN: soft, nontender, no hepatosplenomegaly, no masses and bowel sounds normal  MS: no gross musculoskeletal defects noted, no edema  SKIN: no suspicious lesions or rashes  NEURO: Normal strength and tone, mentation intact and speech normal  PSYCH: mentation appears normal, affect normal/bright         5/8/2024   Mini Cog   Clock Draw Score 2 Normal   3 Item Recall 2 objects recalled   Mini Cog Total Score 4              Signed Electronically by: Brennan Mercado MD    "

## 2024-05-09 LAB
ALBUMIN SERPL BCG-MCNC: 4.3 G/DL (ref 3.5–5.2)
ALP SERPL-CCNC: 89 U/L (ref 40–150)
ALT SERPL W P-5'-P-CCNC: 31 U/L (ref 0–70)
ANION GAP SERPL CALCULATED.3IONS-SCNC: 13 MMOL/L (ref 7–15)
AST SERPL W P-5'-P-CCNC: 26 U/L (ref 0–45)
BILIRUB SERPL-MCNC: 0.4 MG/DL
BUN SERPL-MCNC: 32.4 MG/DL (ref 8–23)
CALCIUM SERPL-MCNC: 10.1 MG/DL (ref 8.8–10.2)
CHLORIDE SERPL-SCNC: 100 MMOL/L (ref 98–107)
CHOLEST SERPL-MCNC: 158 MG/DL
CREAT SERPL-MCNC: 1.77 MG/DL (ref 0.67–1.17)
CREAT UR-MCNC: 104 MG/DL
DEPRECATED HCO3 PLAS-SCNC: 26 MMOL/L (ref 22–29)
EGFRCR SERPLBLD CKD-EPI 2021: 39 ML/MIN/1.73M2
FASTING STATUS PATIENT QL REPORTED: YES
FASTING STATUS PATIENT QL REPORTED: YES
GLUCOSE SERPL-MCNC: 159 MG/DL (ref 70–99)
HDLC SERPL-MCNC: 38 MG/DL
LDLC SERPL CALC-MCNC: 89 MG/DL
MICROALBUMIN UR-MCNC: <12 MG/L
MICROALBUMIN/CREAT UR: NORMAL MG/G{CREAT}
NONHDLC SERPL-MCNC: 120 MG/DL
POTASSIUM SERPL-SCNC: 4.5 MMOL/L (ref 3.4–5.3)
PROT SERPL-MCNC: 7.5 G/DL (ref 6.4–8.3)
PSA SERPL DL<=0.01 NG/ML-MCNC: 0.68 NG/ML (ref 0–6.5)
SODIUM SERPL-SCNC: 139 MMOL/L (ref 135–145)
T4 FREE SERPL-MCNC: 1.39 NG/DL (ref 0.9–1.7)
TRIGL SERPL-MCNC: 154 MG/DL
TSH SERPL DL<=0.005 MIU/L-ACNC: 5.69 UIU/ML (ref 0.3–4.2)
URATE SERPL-MCNC: 7.1 MG/DL (ref 3.4–7)

## 2024-06-06 ENCOUNTER — OFFICE VISIT (OUTPATIENT)
Dept: SLEEP MEDICINE | Facility: CLINIC | Age: 79
End: 2024-06-06
Payer: COMMERCIAL

## 2024-06-06 VITALS
DIASTOLIC BLOOD PRESSURE: 81 MMHG | SYSTOLIC BLOOD PRESSURE: 124 MMHG | OXYGEN SATURATION: 98 % | HEART RATE: 72 BPM | WEIGHT: 218 LBS | HEIGHT: 64 IN | BODY MASS INDEX: 37.22 KG/M2

## 2024-06-06 DIAGNOSIS — G47.33 OSA (OBSTRUCTIVE SLEEP APNEA): Primary | ICD-10-CM

## 2024-06-06 PROCEDURE — 99213 OFFICE O/P EST LOW 20 MIN: CPT | Performed by: NURSE PRACTITIONER

## 2024-06-06 ASSESSMENT — SLEEP AND FATIGUE QUESTIONNAIRES
HOW LIKELY ARE YOU TO NOD OFF OR FALL ASLEEP WHILE SITTING AND READING: HIGH CHANCE OF DOZING
HOW LIKELY ARE YOU TO NOD OFF OR FALL ASLEEP WHILE SITTING AND TALKING TO SOMEONE: SLIGHT CHANCE OF DOZING
HOW LIKELY ARE YOU TO NOD OFF OR FALL ASLEEP IN A CAR, WHILE STOPPED FOR A FEW MINUTES IN TRAFFIC: SLIGHT CHANCE OF DOZING
HOW LIKELY ARE YOU TO NOD OFF OR FALL ASLEEP WHILE WATCHING TV: HIGH CHANCE OF DOZING
HOW LIKELY ARE YOU TO NOD OFF OR FALL ASLEEP WHILE SITTING INACTIVE IN A PUBLIC PLACE: HIGH CHANCE OF DOZING
HOW LIKELY ARE YOU TO NOD OFF OR FALL ASLEEP WHILE SITTING QUIETLY AFTER LUNCH WITHOUT ALCOHOL: SLIGHT CHANCE OF DOZING
HOW LIKELY ARE YOU TO NOD OFF OR FALL ASLEEP WHILE LYING DOWN TO REST IN THE AFTERNOON WHEN CIRCUMSTANCES PERMIT: MODERATE CHANCE OF DOZING
HOW LIKELY ARE YOU TO NOD OFF OR FALL ASLEEP WHEN YOU ARE A PASSENGER IN A CAR FOR AN HOUR WITHOUT A BREAK: HIGH CHANCE OF DOZING

## 2024-06-06 NOTE — PROGRESS NOTES
Sleep Apnea - Follow-up Visit:    Impression/Plan:  1. DOMINIK (obstructive sleep apnea)  - Comprehensive DME     Moderate Sleep apnea. Tolerating PAP well. Daytime symptoms are stable, but indicative of excessive daytime sleepiness.  This appears to be multifactorial with evidence of intermittent hypothyroidism (elevated TSH), mild anemia, frequent awakening due to BPH/up to urinate at night, pets waking him, and suboptimally controlled DM 2 likely contributing.  He does continue to use and benefit from PAP therapy.    A review of his APAP download data over the last 30 days shows excellent use and compliance and moderate DOMINIK that is well-controlled on current pressure settings.    Continue current plan of care.  A comprehensive DME order placed for new mask and supplies sent to [facility], today.    Rodney Hendrickson will follow up in about 1 year(s).  Patient prefers the [place] location due to proximity to his home in [place].    Twenty-five minutes spent with patient, all of which were spent face-to-face counseling, consulting, chart review/documentation, and coordinating plan of care on the date of the encounter.      MAXIMILIANO Ortega CNP  Sleep Medicine      CC:  Brennan Mercado,         History of Present Illness:  Chief Complaint   Patient presents with    CPAP Follow Up       Rodney Hendrickson is a 78-year-old male with a PMH significant for HTN, HLD, DM 2, CKD stage III, BPH, GERD, DOMINIK, and obesity who presents for annual follow-up of their moderate sleep apnea, managed with CPAP.  He was last seen in an office visit on 7/31/2023 with Paulette Garcia NP, in follow-up for DOMINIK on CPAP therapy.  Overall, he is doing well on PAP therapy and has no immediate concerns.  He continues to use and benefit from PAP therapy.    Previous Study Results:  - HST  Date: 4/12/2017.  Weight 197 pounds.  AHI: 25.5/hr. Supine AHI: 65.8/hr. Non-Supine AHI: 18.2/hr. O2 gaye 71.0%.  Time Spent oxygen  saturation below 88% was 16.1 minutes.       Do you use a CPAP Machine at home: Yes  Overall, on a scale of 0-10 how would you rate your CPAP (0 poor, 10 great): 8    What type of mask do you use: Nasal Mask  Is your mask comfortable: Yes  If not, why:      Is your mask leaking: No  If yes, where do you feel it:    How many night per week does the mask leak (0-7):      Do you notice snoring with mask on: No  Do you notice gasping arousals with mask on: No  Are you having significant oral or nasal dryness: No  Is the pressure setting comfortable: Yes  If not, why:      What is your typical bedtime: 10pm  How long does it take you to go to sleep on PAP therapy: 5mn  What time do you typically get out of bed for the day: 5am  How many hours on average per night are you using PAP therapy: 7  How many hours are you sleeping per night: 7  Do you feel well rested in the morning: No      ResMed AirSense 10 (set up on 6/30/2023 at [facility])  Auto-PAP 8.0 - 16.0 cmH2O 30 day usage data:  5/07/24 - 6/05/24  100% of days with > 4 hours of use. 0/30 days with no use.   Average use 436 minutes per day.   95%ile Leak 18.27 L/min.   CPAP 95% pressure 13.8 cm.   AHI 2.36 events per hour.        EPWORTH SLEEPINESS SCALE         6/6/2024     9:22 AM    Dorado Sleepiness Scale ( PANTERA Kellogg  [id number]<br>ESS - [place]/English - Final version - 21 Nov 07 - [facility] [place].)   Sitting and reading High chance of dozing   Watching TV High chance of dozing   Sitting, inactive in a public place (e.g. a theatre or a meeting) High chance of dozing   As a passenger in a car for an hour without a break High chance of dozing   Lying down to rest in the afternoon when circumstances permit Moderate chance of dozing   Sitting and talking to someone Slight chance of dozing   Sitting quietly after a lunch without alcohol Slight chance of dozing   In a car, while stopped for a few minutes in traffic Slight chance of dozing   Dorado Score  () 17   [place] Score (Sleep) 17       INSOMNIA SEVERITY INDEX (KRISTIE)          6/6/2024     9:17 AM   Insomnia Severity Index (KRISTIE)   Difficulty falling asleep 2   Difficulty staying asleep 3   Problems waking up too early 2   How SATISFIED/DISSATISFIED are you with your CURRENT sleep pattern? 2   How NOTICEABLE to others do you think your sleep problem is in terms of impairing the quality of your life? 3   How WORRIED/DISTRESSED are you about your current sleep problem? 1   To what extent do you consider your sleep problem to INTERFERE with your daily functioning (e.g. daytime fatigue, mood, ability to function at work/daily chores, concentration, memory, mood, etc.) CURRENTLY? 2   KRISTIE Total Score 15       Guidelines for Scoring/Interpretation:  Total score categories:  0-7 = No clinically significant insomnia   8-14 = Subthreshold insomnia   15-21 = Clinical insomnia (moderate severity)  22-28 = Clinical insomnia (severe)  Used via courtesy of www.[employer].va.gov with permission from Leon Ocampo PhD., [facility]      Past medical/surgical history, family history, social history, medications and allergies were reviewed.        Problem List:  Patient Active Problem List    Diagnosis Date Noted    Diabetes mellitus, type 2 (H) 06/02/2023     Priority: Medium    CKD (chronic kidney disease) stage 3, GFR 30-59 ml/min (H) 12/19/2019     Priority: Medium    DOMINIK (obstructive sleep apnea) 05/23/2017     Priority: Medium    Benign non-nodular prostatic hyperplasia with lower urinary tract symptoms 03/15/2017     Priority: Medium    Morbid obesity due to excess calories (H) 03/15/2017     Priority: Medium    Hyperlipidemia LDL goal <130      Priority: Medium    Hypertension      Priority: Medium      Current Outpatient Medications   Medication Sig Dispense Refill    allopurinol (ZYLOPRIM) 100 MG tablet Take 1 tablet (100 mg) by mouth daily 90 tablet 3    aspirin 81 MG tablet Take  by mouth daily.       "atorvastatin (LIPITOR) 40 MG tablet Take 1 tablet (40 mg) by mouth daily 90 tablet 3    colchicine (COLCRYS) 0.6 MG tablet Take 1 tablet (0.6 mg) by mouth daily For gout flare up 5 tablet 3    ferrous sulfate (IRON) 325 (65 FE) MG tablet Take by mouth At Bedtime       finasteride (PROSCAR) 5 MG tablet Take 1 tablet (5 mg) by mouth daily 90 tablet 3    lisinopril-hydrochlorothiazide (ZESTORETIC) 20-12.5 MG tablet Take 2 tablets by mouth daily 180 tablet 0    metFORMIN (GLUCOPHAGE) 500 MG tablet Take 1 tablet (500 mg) by mouth 2 times daily (with meals) 180 tablet 3    omeprazole (PRILOSEC) 40 MG DR capsule take 1 capsule (40mg) by mouth daily. take 30 to 60 minutes before a meal. 90 capsule 3    tamsulosin (FLOMAX) 0.4 MG capsule TAKE 1 CAPSULE (0.4MG) BY MOUTH DAILY 90 capsule 3     No current facility-administered medications for this visit.     Facility-Administered Medications Ordered in Other Visits   Medication Dose Route Frequency Provider Last Rate Last Admin    benzocaine (HURRICAINE/TOPEX) 20 % spray    Geoffrey Tracey MD   1 applicator at 01/18/17 0808     /81   Pulse 72   Ht 1.632 m (5' 4.25\")   Wt 98.9 kg (218 lb)   SpO2 98%   BMI 37.13 kg/m        This note was written with the assistance of the Dragon voice-dictation technology software. The final document, although reviewed, may contain errors. For corrections, please contact the office.    "

## 2024-06-06 NOTE — PATIENT INSTRUCTIONS
"MY INFORMATION ON SLEEP APNEA-  Rodney Hendrickson    DOCTOR : MAXIMILIANO Ortega CNP  SLEEP CENTER :      MY CONTACT NUMBER:   [facility]  [phone]  [facility]   [phone]  [facility]   [phone]      [facility]  [phone]  [facility]   [phone]    DME:  [facility] [place] [address], [address]  [place] [address]  Phone: [phone]    Hours:  Mon - Fri: 8:00 a.m. - 4:30 p.m.  Sat: Closed  Sun: Closed      Key Points:  1. What is Obstructive Sleep Apnea (DOMINIK)? DOMINIK is the most common type of sleep apnea. Apnea literally means, \"without breath.\" It is characterized by repetitive pauses in breathing, despite continued effort to breathe, and is usually associated with a reduction in blood oxygen saturation. Apneas can last 10 to over 60 seconds. It is caused by narrowing or collapse of the upper airway as muscles relax during sleep.   2. What are the consequences of DOMINIK? Symptoms include: daytime sleepiness- possibly increasing the risk of falling asleep while driving, unrefreshing/restless sleep, snoring, insomnia, waking frequently to urinate, waking with heartburn or reflux, reduced concentration and memory, and morning headaches. Other health consequences may include development of high blood pressure. Untreated DOMINIK also can contribute to heart disease, stroke and diabetes.   3. What are the treatment options? In most situations, sleep apnea is a lifelong disease that must be managed with daily therapy. Continuous Positive Airway (CPAP) is the most reliable treatment. A mouthguard to hold your jaw forward is usually the next most reliable option. Other options include postioning devices (to keep you off your back), nasal valves, tongue retaining device, weight loss, surgery. There is more detail about these options toward the end of this " document.  4. What are the most important things to remember about using CPAP?     WHERE CAN I FIND MORE INFORMATION?    American Academy of Sleep Medicine Patient information on sleep disorders:  http://yoursleep.aasmnet.org    CPAP -  WHY AND HOW?                 Continuous positive airway pressure, or CPAP, is the most effective treatment for obstructive sleep apnea. It works by blowing room air, through a mask, to hold your throat open. A decision to use CPAP is a major step forward in the pursuit of a healthier life. The successful use of CPAP will help you breathe easier, sleep better and live healthier. Using CPAP can be a positive experience if you keep these krishna points in mind:  Commitment  CPAP is not a quick fix for your problem. It involves a long-term commitment to improve your sleep and your health.    Communication  Stay in close communication with both your sleep doctor and your CPAP supplier. Ask lots of questions and seek help when you need it.    Consistency  Use CPAP all night, every night and for every nap. You will receive the maximum health benefits from CPAP when you use it every time that you sleep. This will also make it easier for your body to adjust to the treatment.    Correction  The first machine and mask that you try may not be the best ones for you. Work with your sleep doctor and your CPAP supplier to make corrections to your equipment selection. Ask about trying a different type of machine or mask if you have ongoing problems. Make sure that your mask is a good fit and learn to use your equipment properly.    Challenge  Tell a family member or close friend to ask you each morning if you used your CPAP the previous night. Have someone to challenge you to give it your best effort.    Connection   Your adjustment to CPAP will be easier if you are able to connect with others who use the same treatment. Ask your sleep doctor if there is a support group in your area for people who have  "sleep apnea, or look for one on the Internet.  Comfort   Increase your level of comfort by using a saline spray, decongestant or heated humidifier if CPAP irritates your nose, mouth or throat. Use your unit's \"ramp\" setting to slowly get used to the air pressure level. There may be soft pads you can buy that will fit over your mask straps. Look on www.CPAP.com for accessories that can help make CPAP use more comfortable.  Cleaning   Clean your mask, tubing and headgear on a regular basis. Put this time in your schedule so that you don't forget to do it. Check and replace the filters for your CPAP unit and humidifier.    Completion   Although you are never finished with CPAP therapy, you should reward yourself by celebrating the completion of your first month of treatment. Expect this first month to be your hardest period of adjustment. It will involve some trial and error as you find the machine, mask and pressure settings that are right for you.    Continuation  After your first month of treatment, continue to make a daily commitment to use your CPAP all night, every night and for every nap.    CPAP-Tips to starting with success:  Begin using your CPAP for short periods of time during the day while you watch TV or read.    Use CPAP every night and for every nap. Using it less often reduces the health benefits and makes it harder for your body to get used to it.    Newer CPAP models are virtually silent; however, if you find the sound of your CPAP machine to be bothersome, place the unit under your bed to dampen the sound.     Make small adjustments to your mask, tubing, straps and headgear until you get the right fit. Tightening the mask may actually worsen the leak.  If it leaves significant marks on your face or irritates the bridge of your nose, it may not be the best mask for you.  Speak with the person who supplied the mask and consider trying other masks. Insurances will allow you to try different masks " during the first month of starting CPAP.  Insurance also covers a new mask, hose and filter about every 6 months.    Use a saline nasal spray to ease mild nasal congestion. Neti-Pot or saline nasal rinses may also help. Nasal gel sprays can help reduce nasal dryness.  Biotene mouthwash can be helpful to protect your teeth if you experience frequent dry mouth.  Dry mouth may be a sign of air escaping out of your mouth or out of the mask in the case of a full face mask.  Speak with your provider if you expect that is the case.     Take a nasal decongestant to relieve more severe nasal or sinus congestion.  Do not use Afrin (oxymetazoline) nasal spray more than 3 days in a row.  Speak with your sleep doctor if your nasal congestion is chronic.    Use a heated humidifier that fits your CPAP model to enhance your breathing comfort. Adjust the heat setting up if you get a dry nose or throat, down if you get condensation in the hose or mask.  Position the CPAP lower than you so that any condensation in the hose drains back into the machine rather than towards the mask.    Try a system that uses nasal pillows if traditional masks give you problems.    Clean your mask, tubing and headgear once a week. Make sure the equipment dries fully.    Regularly check and replace the filters for your CPAP unit and humidifier.    Work closely with your sleep provider and your CPAP supplier to make sure that you have the machine, mask and air pressure setting that works best for you. It is better to stop using it and call your provider to solve problems than to lay awake all night frustrated with the device.  Weight Loss:    Weight loss decreases severity of sleep apnea in most people with obesity. For those with mild obesity who have developed snoring with weight gain, even 15-30 pound weight loss can improve and occasionally eliminate sleep apnea.  Structured and life-long dietary and health habits are necessary to lose weight and keep  healthier weight levels.     Though there are significant health benefits from weight loss, long-term weight loss is very difficult to achieve- studies show success with dietary management in less than 10% of people. In addition, substantial weight loss may require years of dietary control and may be difficult if patients have severe obesity. In these cases, surgical management may be considered.    If you are interested in methods for weight loss, you should review the options discussed at the National Institutes of Health patient information sites:     http://win.niddk.nih.gov/publications/index.htm  http:/www.health.nih.gov/topic/WeightLossDieting    Bariatric programs offer counseling in all methods of weight loss:    Http:/www.[facility]edic[facility] Health.org/Specialties/WeightLossSurgeryandMedicalMgmt/htm    Your BMI is Body mass index is 37.13 kg/m .    Weight management plan: Patient was referred to their PCP to discuss a diet and exercise plan.    Body mass index (BMI) is one way to tell whether you are at a healthy weight, overweight, or obese. It measures your weight in relation to your height.  A BMI of 18.5 to 24.9 is in the healthy range. A person with a BMI of 25 to 29.9 is considered overweight, and someone with a BMI of 30 or greater is considered obese.  Another way to find out if you are at risk for health problems caused by overweight and obesity is to measure your waist. If you are a woman and your waist is more than 35 inches, or if you are a man and your waist is more than 40 inches, your risk of disease may be higher.  More than two-thirds of American adults are considered overweight or obese. Being overweight or obese increases the risk for further weight gain.  Excess weight may lead to heart disease and diabetes. Creating and following plans for healthy eating and physical activity may help you improve your health.    Methods for maintaining or losing weight.    Weight control is part of healthy  lifestyle and includes exercise, emotional health, and healthy eating habits.  Careful eating habits lifelong is the mainstay of weight control.  Though there are significant health benefits from weight loss, long-term weight loss with diet alone may be very difficult to achieve- studies show long-term success with dietary management in less than 10% of people. Attaining a healthy weight may be especially difficult to achieve in those with severe obesity. In some cases, medications, devices and surgical management might be considered.    What can you do?    If you are overweight or obese and are interested in methods for weight loss, you should discuss this with your provider. In addition, we recommend that you review healthy life styles and methods for weight loss available through the National Institutes of Health patient information sites:     http://win.niddk.nih.gov/publications/index.htm

## 2024-06-08 DIAGNOSIS — I10 BENIGN ESSENTIAL HYPERTENSION: ICD-10-CM

## 2024-06-10 RX ORDER — LISINOPRIL AND HYDROCHLOROTHIAZIDE 12.5; 2 MG/1; MG/1
2 TABLET ORAL DAILY
Qty: 180 TABLET | Refills: 0 | OUTPATIENT
Start: 2024-06-10

## 2024-06-13 DIAGNOSIS — M1A.0790 CHRONIC IDIOPATHIC GOUT INVOLVING TOE WITHOUT TOPHUS, UNSPECIFIED LATERALITY: ICD-10-CM

## 2024-06-13 RX ORDER — COLCHICINE 0.6 MG/1
0.6 TABLET ORAL DAILY
Qty: 5 TABLET | Refills: 0 | Status: SHIPPED | OUTPATIENT
Start: 2024-06-13

## 2024-08-15 DIAGNOSIS — I10 BENIGN ESSENTIAL HYPERTENSION: ICD-10-CM

## 2024-08-15 RX ORDER — LISINOPRIL AND HYDROCHLOROTHIAZIDE 12.5; 2 MG/1; MG/1
2 TABLET ORAL DAILY
Qty: 180 TABLET | Refills: 0 | Status: SHIPPED | OUTPATIENT
Start: 2024-08-15

## 2024-11-14 DIAGNOSIS — I10 BENIGN ESSENTIAL HYPERTENSION: ICD-10-CM

## 2024-11-14 RX ORDER — LISINOPRIL AND HYDROCHLOROTHIAZIDE 12.5; 2 MG/1; MG/1
2 TABLET ORAL DAILY
Qty: 180 TABLET | Refills: 0 | Status: SHIPPED | OUTPATIENT
Start: 2024-11-14

## 2025-02-06 DIAGNOSIS — I10 BENIGN ESSENTIAL HYPERTENSION: ICD-10-CM

## 2025-02-06 RX ORDER — LISINOPRIL AND HYDROCHLOROTHIAZIDE 12.5; 2 MG/1; MG/1
2 TABLET ORAL DAILY
Qty: 180 TABLET | Refills: 0 | Status: SHIPPED | OUTPATIENT
Start: 2025-02-06

## 2025-02-27 ENCOUNTER — TELEPHONE (OUTPATIENT)
Dept: INTERNAL MEDICINE | Facility: CLINIC | Age: 80
End: 2025-02-27
Payer: COMMERCIAL

## 2025-02-27 DIAGNOSIS — Z12.5 ENCOUNTER FOR SCREENING FOR MALIGNANT NEOPLASM OF PROSTATE: ICD-10-CM

## 2025-02-27 DIAGNOSIS — Z00.00 ENCOUNTER FOR PREVENTATIVE ADULT HEALTH CARE EXAMINATION: Primary | ICD-10-CM

## 2025-02-27 NOTE — TELEPHONE ENCOUNTER
FYI - Status Update    Who is Calling: patient    Update: He would like his blood work done before his wellness and he has a lab scheduled     Does caller want a call/response back: No

## 2025-03-25 ENCOUNTER — OFFICE VISIT (OUTPATIENT)
Dept: URGENT CARE | Facility: URGENT CARE | Age: 80
End: 2025-03-25
Payer: COMMERCIAL

## 2025-03-25 ENCOUNTER — ANCILLARY PROCEDURE (OUTPATIENT)
Dept: GENERAL RADIOLOGY | Facility: CLINIC | Age: 80
End: 2025-03-25
Attending: PHYSICIAN ASSISTANT
Payer: COMMERCIAL

## 2025-03-25 VITALS
BODY MASS INDEX: 37.22 KG/M2 | HEART RATE: 100 BPM | RESPIRATION RATE: 16 BRPM | DIASTOLIC BLOOD PRESSURE: 74 MMHG | SYSTOLIC BLOOD PRESSURE: 126 MMHG | HEIGHT: 64 IN | OXYGEN SATURATION: 96 % | TEMPERATURE: 98.2 F | WEIGHT: 218 LBS

## 2025-03-25 DIAGNOSIS — A08.4 VIRAL GASTROENTERITIS: ICD-10-CM

## 2025-03-25 DIAGNOSIS — R05.3 CHRONIC COUGH: ICD-10-CM

## 2025-03-25 DIAGNOSIS — J68.3 REACTIVE AIRWAYS DYSFUNCTION SYNDROME (H): ICD-10-CM

## 2025-03-25 DIAGNOSIS — I10 ESSENTIAL HYPERTENSION, BENIGN: ICD-10-CM

## 2025-03-25 DIAGNOSIS — R09.81 CHRONIC NASAL CONGESTION: ICD-10-CM

## 2025-03-25 DIAGNOSIS — R11.10 ACUTE VOMITING: Primary | ICD-10-CM

## 2025-03-25 PROCEDURE — 3078F DIAST BP <80 MM HG: CPT | Performed by: PHYSICIAN ASSISTANT

## 2025-03-25 PROCEDURE — 3074F SYST BP LT 130 MM HG: CPT | Performed by: PHYSICIAN ASSISTANT

## 2025-03-25 PROCEDURE — 71046 X-RAY EXAM CHEST 2 VIEWS: CPT | Mod: TC | Performed by: RADIOLOGY

## 2025-03-25 PROCEDURE — 99214 OFFICE O/P EST MOD 30 MIN: CPT | Performed by: PHYSICIAN ASSISTANT

## 2025-03-25 RX ORDER — ONDANSETRON 4 MG/1
4 TABLET, ORALLY DISINTEGRATING ORAL EVERY 8 HOURS PRN
Qty: 10 TABLET | Refills: 0 | Status: SHIPPED | OUTPATIENT
Start: 2025-03-25

## 2025-03-25 RX ORDER — LOSARTAN POTASSIUM AND HYDROCHLOROTHIAZIDE 12.5; 5 MG/1; MG/1
1 TABLET ORAL DAILY
Qty: 90 TABLET | Refills: 0 | Status: SHIPPED | OUTPATIENT
Start: 2025-03-25

## 2025-03-25 RX ORDER — FLUTICASONE PROPIONATE 50 MCG
1 SPRAY, SUSPENSION (ML) NASAL DAILY
Qty: 16 G | Refills: 0 | Status: SHIPPED | OUTPATIENT
Start: 2025-03-25

## 2025-03-25 RX ORDER — ALBUTEROL SULFATE 90 UG/1
2-4 INHALANT RESPIRATORY (INHALATION) EVERY 6 HOURS PRN
Qty: 18 G | Refills: 0 | Status: SHIPPED | OUTPATIENT
Start: 2025-03-25

## 2025-03-25 ASSESSMENT — ENCOUNTER SYMPTOMS
COUGH: 1
SINUS PAIN: 0
CHILLS: 1
SINUS PRESSURE: 0
FATIGUE: 1
RHINORRHEA: 1
WHEEZING: 1
FEVER: 0

## 2025-03-25 NOTE — PROGRESS NOTES
Assessment & Plan:        ICD-10-CM    1. Acute vomiting  R11.10 ondansetron (ZOFRAN ODT) 4 MG ODT tab      2. Viral gastroenteritis  A08.4 ondansetron (ZOFRAN ODT) 4 MG ODT tab      3. Chronic cough  R05.3 XR Chest 2 Views      4. Chronic nasal congestion  R09.81 fluticasone (FLONASE) 50 MCG/ACT nasal spray      5. Essential hypertension, benign  I10 losartan-hydrochlorothiazide (HYZAAR) 50-12.5 MG tablet      6. Reactive airways dysfunction syndrome (H)  J68.3 albuterol (PROAIR HFA/PROVENTIL HFA/VENTOLIN HFA) 108 (90 Base) MCG/ACT inhaler            Plan/Clinical Decision Makin) Acute vomiting/diarrhea.   Rest, fluids, good hand washing. Clear fluids.   Progress to bland diet. Zofran if needed for vomiting and nausea.     2) Chronic cough and nasal congestion for 4-6 weeks.   Discussed using Flonase daily and albuterol inhaler.   I independently visualized the xray:  no infiltrate, opacity seen. No effusion.     3) Long term cough  Wife mentioned during visit has had mild longer term cough.   GERD well controlled.   Will switch Lisinopril to losartan.  Has primary care Follow-up and can recheck at that time.     Patient Instructions   1) Use Zofran for nausea and vomiting.   Clear fluids.     2) Due to years of a chronic cough, I'll switch you from Lisinopril to Losartan.   Monitor blood pressure and then further recheck with primary at next visit in May.     3) Due to subacute cough, wheezing, and nasal congestion  Start daily Flonase and use albuterol inhaler as needed for wheezing.     Return if symptoms worsen or fail to improve, for in 5-7 days.     At the end of the encounter, I discussed results, diagnosis, medications. Discussed red flags for immediate return to clinic/ER, as well as indications for follow up if no improvement. Patient understood and agreed to plan. Patient was stable for discharge.        Shyla Kemp PA-C on 3/25/2025 at 12:05 PM          Subjective:     HPI:    Rodney is a  79 year old male who presents to clinic today for the following health issues:  Chief Complaint   Patient presents with    Vomiting     Diarrhea/Vomiting x 1 day and hacky/phlemy coughing, sinus pressure, drainage 4 weeks --taking nothing for this     HPI    1) Patient complains of vomiting and diarrhea.   Feeling tired and run down.   Started vomiting last night and today, about 5 times.   Has had about 3 bouts of diarrhea.   Feeling chills.   Holding down fluids.   No recent travel.     2) For the past month has had heavy cough.   Having nasal congestion. No persistent rhinorrhea.   Nonsmoker.   Quit smoking 40-50 years ago.   Has had some wheezing.   Denies hx of asthma or lung problems.     Wife mentions long term chronic cough for at least a year.   Is on lisinopril.     Review of Systems   Constitutional:  Positive for chills and fatigue. Negative for fever.   HENT:  Positive for congestion (chronic) and rhinorrhea (minimal). Negative for sinus pressure and sinus pain.    Respiratory:  Positive for cough and wheezing.          Patient Active Problem List   Diagnosis    Benign non-nodular prostatic hyperplasia with lower urinary tract symptoms    Hyperlipidemia LDL goal <130    Hypertension    Morbid obesity due to excess calories (H)    DOMINIK (obstructive sleep apnea)    CKD (chronic kidney disease) stage 3, GFR 30-59 ml/min (H)    Diabetes mellitus, type 2 (H)        Past Medical History:   Diagnosis Date    BPH (benign prostatic hyperplasia)     GERD (gastroesophageal reflux disease)     Hyperlipidemia LDL goal <130     Hypertension        Social History     Tobacco Use    Smoking status: Former     Current packs/day: 0.00     Types: Cigarettes     Quit date: 1983     Years since quittin.9    Smokeless tobacco: Never   Substance Use Topics    Alcohol use: Yes     Comment: once every couple monthly             Objective:     Vitals:    25 1144   BP: 126/74   BP Location: Right arm   Patient  "Position: Chair   Cuff Size: Adult Regular   Pulse: 100   Resp: 16   Temp: 98.2  F (36.8  C)   TempSrc: Oral   SpO2: 96%   Weight: 98.9 kg (218 lb)   Height: 1.626 m (5' 4\")         Physical Exam   EXAM:   Pleasant, alert, appropriate appearance. NAD.  Head Exam: Normocephalic, atraumatic.  Eye Exam:  , non icteric/injection.    Ear Exam: TMs grey without bulging. Normal canals.  Normal pinna.  Nose Exam: Normal external nose.  Mild congestion. No sinus tenderness.   OroPharynx Exam:  Moist mucous membranes. No erythema, pharynx without exudate or hypertrophy.  Neck/Thyroid Exam:  No LAD.  No nodules or enlargement.  Chest/Respiratory Exam: slight wheezing.   Cardiovascular Exam: RRR. No murmur or rubs.      Results:  Results for orders placed or performed in visit on 03/25/25   XR Chest 2 Views     Status: None    Narrative    EXAM: XR CHEST 2 VIEWS  LOCATION: [facility]  DATE: 3/25/2025    INDICATION:  Chronic cough  COMPARISON: None.      Impression    IMPRESSION: Borderline enlarged cardiomediastinal silhouette. Vascular calcifications are seen in the thoracic aorta. Subsegmental atelectasis is seen in the lung bases. Multilevel degenerative changes are present in the spine.       "

## 2025-03-25 NOTE — PATIENT INSTRUCTIONS
1) Use Zofran for nausea and vomiting.   Clear fluids.     2) Due to years of a chronic cough, I'll switch you from Lisinopril to Losartan.   Monitor blood pressure and then further recheck with primary at next visit in May.     3) Due to subacute cough, wheezing, and nasal congestion  Start daily Flonase and use albuterol inhaler as needed for wheezing.

## 2025-05-04 DIAGNOSIS — K21.9 GASTROESOPHAGEAL REFLUX DISEASE WITHOUT ESOPHAGITIS: ICD-10-CM

## 2025-05-04 DIAGNOSIS — E78.5 HYPERLIPIDEMIA LDL GOAL <130: ICD-10-CM

## 2025-05-04 DIAGNOSIS — M1A.0790 CHRONIC IDIOPATHIC GOUT INVOLVING TOE WITHOUT TOPHUS, UNSPECIFIED LATERALITY: ICD-10-CM

## 2025-05-04 DIAGNOSIS — N40.1 BENIGN NON-NODULAR PROSTATIC HYPERPLASIA WITH LOWER URINARY TRACT SYMPTOMS: ICD-10-CM

## 2025-05-04 DIAGNOSIS — E11.9 TYPE 2 DIABETES MELLITUS WITHOUT COMPLICATION, WITHOUT LONG-TERM CURRENT USE OF INSULIN (H): ICD-10-CM

## 2025-05-04 DIAGNOSIS — R35.0 BENIGN PROSTATIC HYPERPLASIA WITH URINARY FREQUENCY: ICD-10-CM

## 2025-05-04 DIAGNOSIS — N40.1 BENIGN PROSTATIC HYPERPLASIA WITH URINARY FREQUENCY: ICD-10-CM

## 2025-05-05 RX ORDER — OMEPRAZOLE 40 MG/1
CAPSULE, DELAYED RELEASE ORAL
Qty: 90 CAPSULE | Refills: 0 | Status: SHIPPED | OUTPATIENT
Start: 2025-05-05

## 2025-05-05 RX ORDER — ATORVASTATIN CALCIUM 40 MG/1
40 TABLET, FILM COATED ORAL DAILY
Qty: 90 TABLET | Refills: 0 | Status: SHIPPED | OUTPATIENT
Start: 2025-05-05

## 2025-05-05 RX ORDER — ALLOPURINOL 100 MG/1
100 TABLET ORAL DAILY
Qty: 90 TABLET | Refills: 0 | Status: SHIPPED | OUTPATIENT
Start: 2025-05-05

## 2025-05-05 RX ORDER — FINASTERIDE 5 MG/1
1 TABLET, FILM COATED ORAL DAILY
Qty: 90 TABLET | Refills: 0 | Status: SHIPPED | OUTPATIENT
Start: 2025-05-05

## 2025-05-05 RX ORDER — TAMSULOSIN HYDROCHLORIDE 0.4 MG/1
CAPSULE ORAL
Qty: 90 CAPSULE | Refills: 0 | Status: SHIPPED | OUTPATIENT
Start: 2025-05-05

## 2025-05-07 ENCOUNTER — ALLIED HEALTH/NURSE VISIT (OUTPATIENT)
Dept: INTERNAL MEDICINE | Facility: CLINIC | Age: 80
End: 2025-05-07
Payer: COMMERCIAL

## 2025-05-07 VITALS — SYSTOLIC BLOOD PRESSURE: 157 MMHG | DIASTOLIC BLOOD PRESSURE: 80 MMHG

## 2025-05-07 DIAGNOSIS — Z01.30 BLOOD PRESSURE CHECK: Primary | ICD-10-CM

## 2025-05-07 PROCEDURE — 99207 PR NO CHARGE NURSE ONLY: CPT | Performed by: INTERNAL MEDICINE

## 2025-05-07 PROCEDURE — 3079F DIAST BP 80-89 MM HG: CPT | Performed by: INTERNAL MEDICINE

## 2025-05-07 PROCEDURE — 3077F SYST BP >= 140 MM HG: CPT | Performed by: INTERNAL MEDICINE

## 2025-05-07 NOTE — PROGRESS NOTES
Rodney Hendrickson was evaluated at [facility] on May 7, 2025 at which time his blood pressure was:    BP Readings from Last 1 Encounters:   05/07/25 (!) 157/80     No data recorded      Reviewed lifestyle modifications for blood pressure control and reduction: including making healthy food choices, managing weight, getting regular exercise, smoking cessation, reducing alcohol consumption, monitoring blood pressure regularly.     Symptoms: None    BP Goal:< 140/90 mmHg    BP Assessment:  BP too high    Potential Reasons for BP too high: NA - Not applicable    BP Follow-Up Plan: Recheck BP in 30 days in the pharmacy.Date of next scheduled BP visit or call:      Recommendation to Provider:     Note completed by: Lester Hickey RPH

## 2025-05-12 ENCOUNTER — LAB (OUTPATIENT)
Dept: LAB | Facility: CLINIC | Age: 80
End: 2025-05-12
Payer: COMMERCIAL

## 2025-05-12 ENCOUNTER — OFFICE VISIT (OUTPATIENT)
Dept: INTERNAL MEDICINE | Facility: CLINIC | Age: 80
End: 2025-05-12
Payer: COMMERCIAL

## 2025-05-12 VITALS
HEIGHT: 64 IN | OXYGEN SATURATION: 97 % | HEART RATE: 91 BPM | TEMPERATURE: 98 F | DIASTOLIC BLOOD PRESSURE: 84 MMHG | SYSTOLIC BLOOD PRESSURE: 160 MMHG | BODY MASS INDEX: 37.27 KG/M2 | RESPIRATION RATE: 18 BRPM | WEIGHT: 218.3 LBS

## 2025-05-12 DIAGNOSIS — E78.5 HYPERLIPIDEMIA LDL GOAL <130: ICD-10-CM

## 2025-05-12 DIAGNOSIS — E11.9 TYPE 2 DIABETES MELLITUS WITHOUT COMPLICATION, WITHOUT LONG-TERM CURRENT USE OF INSULIN (H): ICD-10-CM

## 2025-05-12 DIAGNOSIS — Z00.00 ENCOUNTER FOR PREVENTATIVE ADULT HEALTH CARE EXAMINATION: ICD-10-CM

## 2025-05-12 DIAGNOSIS — I10 ESSENTIAL HYPERTENSION, BENIGN: ICD-10-CM

## 2025-05-12 DIAGNOSIS — E11.22 TYPE 2 DIABETES MELLITUS WITH STAGE 3 CHRONIC KIDNEY DISEASE, WITHOUT LONG-TERM CURRENT USE OF INSULIN, UNSPECIFIED WHETHER STAGE 3A OR 3B CKD (H): ICD-10-CM

## 2025-05-12 DIAGNOSIS — Z00.00 ENCOUNTER FOR PREVENTATIVE ADULT HEALTH CARE EXAMINATION: Primary | ICD-10-CM

## 2025-05-12 DIAGNOSIS — N18.32 STAGE 3B CHRONIC KIDNEY DISEASE (H): ICD-10-CM

## 2025-05-12 DIAGNOSIS — Z12.5 ENCOUNTER FOR SCREENING FOR MALIGNANT NEOPLASM OF PROSTATE: ICD-10-CM

## 2025-05-12 DIAGNOSIS — E66.01 MORBID OBESITY DUE TO EXCESS CALORIES (H): ICD-10-CM

## 2025-05-12 DIAGNOSIS — N40.1 BENIGN NON-NODULAR PROSTATIC HYPERPLASIA WITH LOWER URINARY TRACT SYMPTOMS: ICD-10-CM

## 2025-05-12 DIAGNOSIS — N18.30 TYPE 2 DIABETES MELLITUS WITH STAGE 3 CHRONIC KIDNEY DISEASE, WITHOUT LONG-TERM CURRENT USE OF INSULIN, UNSPECIFIED WHETHER STAGE 3A OR 3B CKD (H): ICD-10-CM

## 2025-05-12 LAB
ALBUMIN SERPL BCG-MCNC: 4.3 G/DL (ref 3.5–5.2)
ALBUMIN UR-MCNC: NEGATIVE MG/DL
ALP SERPL-CCNC: 77 U/L (ref 40–150)
ALT SERPL W P-5'-P-CCNC: 36 U/L (ref 0–70)
ANION GAP SERPL CALCULATED.3IONS-SCNC: 12 MMOL/L (ref 7–15)
APPEARANCE UR: CLEAR
AST SERPL W P-5'-P-CCNC: 30 U/L (ref 0–45)
BILIRUB SERPL-MCNC: 0.4 MG/DL
BILIRUB UR QL STRIP: NEGATIVE
BUN SERPL-MCNC: 32.8 MG/DL (ref 8–23)
CALCIUM SERPL-MCNC: 9.7 MG/DL (ref 8.8–10.4)
CHLORIDE SERPL-SCNC: 101 MMOL/L (ref 98–107)
CHOLEST SERPL-MCNC: 177 MG/DL
COLOR UR AUTO: YELLOW
CREAT SERPL-MCNC: 1.63 MG/DL (ref 0.67–1.17)
EGFRCR SERPLBLD CKD-EPI 2021: 43 ML/MIN/1.73M2
ERYTHROCYTE [DISTWIDTH] IN BLOOD BY AUTOMATED COUNT: 12.4 % (ref 10–15)
EST. AVERAGE GLUCOSE BLD GHB EST-MCNC: 189 MG/DL
FASTING STATUS PATIENT QL REPORTED: YES
FASTING STATUS PATIENT QL REPORTED: YES
GLUCOSE SERPL-MCNC: 202 MG/DL (ref 70–99)
GLUCOSE UR STRIP-MCNC: NEGATIVE MG/DL
HBA1C MFR BLD: 8.2 % (ref 0–5.6)
HCO3 SERPL-SCNC: 26 MMOL/L (ref 22–29)
HCT VFR BLD AUTO: 38.9 % (ref 40–53)
HDLC SERPL-MCNC: 36 MG/DL
HGB BLD-MCNC: 13.6 G/DL (ref 13.3–17.7)
HGB UR QL STRIP: NEGATIVE
KETONES UR STRIP-MCNC: NEGATIVE MG/DL
LDLC SERPL CALC-MCNC: 92 MG/DL
LEUKOCYTE ESTERASE UR QL STRIP: NEGATIVE
MCH RBC QN AUTO: 32.4 PG (ref 26.5–33)
MCHC RBC AUTO-ENTMCNC: 35 G/DL (ref 31.5–36.5)
MCV RBC AUTO: 93 FL (ref 78–100)
NITRATE UR QL: NEGATIVE
NONHDLC SERPL-MCNC: 141 MG/DL
PH UR STRIP: 6.5 [PH] (ref 5–7)
PLATELET # BLD AUTO: 218 10E3/UL (ref 150–450)
POTASSIUM SERPL-SCNC: 4 MMOL/L (ref 3.4–5.3)
PROT SERPL-MCNC: 7.3 G/DL (ref 6.4–8.3)
PSA SERPL DL<=0.01 NG/ML-MCNC: 0.58 NG/ML (ref 0–6.5)
RBC # BLD AUTO: 4.2 10E6/UL (ref 4.4–5.9)
RBC #/AREA URNS AUTO: NORMAL /HPF
SODIUM SERPL-SCNC: 139 MMOL/L (ref 135–145)
SP GR UR STRIP: 1.02 (ref 1–1.03)
T4 FREE SERPL-MCNC: 1.33 NG/DL (ref 0.9–1.7)
TRIGL SERPL-MCNC: 245 MG/DL
TSH SERPL DL<=0.005 MIU/L-ACNC: 4.8 UIU/ML (ref 0.3–4.2)
URATE SERPL-MCNC: 7.1 MG/DL (ref 3.4–7)
UROBILINOGEN UR STRIP-ACNC: 0.2 E.U./DL
WBC # BLD AUTO: 5.9 10E3/UL (ref 4–11)
WBC #/AREA URNS AUTO: NORMAL /HPF

## 2025-05-12 PROCEDURE — 36415 COLL VENOUS BLD VENIPUNCTURE: CPT

## 2025-05-12 PROCEDURE — 81001 URINALYSIS AUTO W/SCOPE: CPT

## 2025-05-12 PROCEDURE — 85027 COMPLETE CBC AUTOMATED: CPT

## 2025-05-12 PROCEDURE — 99214 OFFICE O/P EST MOD 30 MIN: CPT | Mod: 25 | Performed by: INTERNAL MEDICINE

## 2025-05-12 PROCEDURE — 84443 ASSAY THYROID STIM HORMONE: CPT

## 2025-05-12 PROCEDURE — 82043 UR ALBUMIN QUANTITATIVE: CPT

## 2025-05-12 PROCEDURE — 3079F DIAST BP 80-89 MM HG: CPT | Performed by: INTERNAL MEDICINE

## 2025-05-12 PROCEDURE — 80053 COMPREHEN METABOLIC PANEL: CPT

## 2025-05-12 PROCEDURE — 84550 ASSAY OF BLOOD/URIC ACID: CPT

## 2025-05-12 PROCEDURE — G0103 PSA SCREENING: HCPCS

## 2025-05-12 PROCEDURE — 80061 LIPID PANEL: CPT

## 2025-05-12 PROCEDURE — 84439 ASSAY OF FREE THYROXINE: CPT

## 2025-05-12 PROCEDURE — 83036 HEMOGLOBIN GLYCOSYLATED A1C: CPT

## 2025-05-12 PROCEDURE — G0439 PPPS, SUBSEQ VISIT: HCPCS | Performed by: INTERNAL MEDICINE

## 2025-05-12 PROCEDURE — 3077F SYST BP >= 140 MM HG: CPT | Performed by: INTERNAL MEDICINE

## 2025-05-12 PROCEDURE — G2211 COMPLEX E/M VISIT ADD ON: HCPCS | Performed by: INTERNAL MEDICINE

## 2025-05-12 PROCEDURE — 1126F AMNT PAIN NOTED NONE PRSNT: CPT | Performed by: INTERNAL MEDICINE

## 2025-05-12 PROCEDURE — 82570 ASSAY OF URINE CREATININE: CPT

## 2025-05-12 RX ORDER — LOSARTAN POTASSIUM AND HYDROCHLOROTHIAZIDE 25; 100 MG/1; MG/1
1 TABLET ORAL DAILY
Qty: 90 TABLET | Refills: 3 | Status: SHIPPED | OUTPATIENT
Start: 2025-05-12

## 2025-05-12 SDOH — HEALTH STABILITY: PHYSICAL HEALTH: ON AVERAGE, HOW MANY DAYS PER WEEK DO YOU ENGAGE IN MODERATE TO STRENUOUS EXERCISE (LIKE A BRISK WALK)?: 3 DAYS

## 2025-05-12 ASSESSMENT — SOCIAL DETERMINANTS OF HEALTH (SDOH): HOW OFTEN DO YOU GET TOGETHER WITH FRIENDS OR RELATIVES?: TWICE A WEEK

## 2025-05-12 ASSESSMENT — PAIN SCALES - GENERAL: PAINLEVEL_OUTOF10: NO PAIN (0)

## 2025-05-12 NOTE — PROGRESS NOTES
"Preventive Care Visit  [facility]  Brennan Mercado MD, Internal Medicine  May 12, 2025      Assessment & Plan     Type 2 diabetes mellitus without complication, without long-term current use of insulin (H)  Continue treatment  Assess lab  - OFFICE/OUTPT VISIT,EST,LEVL III    Essential hypertension, benign  Increase dose of Hyzaar, keep low salt diet, monitor lab   - losartan-hydrochlorothiazide (HYZAAR) 100-25 MG tablet; Take 1 tablet by mouth daily.  - OFFICE/OUTPT VISIT,EST,LEVL III    Benign non-nodular prostatic hyperplasia with lower urinary tract symptoms  On treatment  Controlled  - OFFICE/OUTPT VISIT,EST,LEVL III    Hyperlipidemia LDL goal <130  Monitor lab, on statin   - OFFICE/OUTPT VISIT,EST,LEVL III    Stage 3b chronic kidney disease (H)  Keep hydrated   Avoid NSAID use  Monitor lab    - OFFICE/OUTPT VISIT,EST,LEVL III    Encounter for preventative adult health care examination  advised regular aerobic activity, low cholesterol, low salt diet, wearing seat belt,  self examinations, sunscreen protection.Obtain screening cholesterol, immunizations reviewed.      Patient has been advised of split billing requirements and indicates understanding: Yes        BMI  Estimated body mass index is 37.47 kg/m  as calculated from the following:    Height as of this encounter: 1.626 m (5' 4\").    Weight as of this encounter: 99 kg (218 lb 4.8 oz).   Weight management plan: Discussed healthy diet and exercise guidelines    Counseling  Appropriate preventive services were addressed with this patient via screening, questionnaire, or discussion as appropriate for fall prevention, nutrition, physical activity, Tobacco-use cessation, social engagement, weight loss and cognition.  Checklist reviewing preventive services available has been given to the patient.  Reviewed patient's diet, addressing concerns and/or questions.   He is at risk for lack of exercise and has been provided with " information to increase physical activity for the benefit of his well-being.       Follow-up in 6 months       Ellie Stevens is a 79 year old, presenting for the following:  Wellness Visit        5/12/2025     2:44 PM   Additional Questions   Roomed by Tarah NARVAEZ   Accompanied by n/a         ANA LAURA  No acute complaints, no medication change or new medical conditions.  Has h/o HTN. on medical treatment. BP has not been controlled. No side effects from medications. No CP, HA, dizziness. good compliance with medications and low salt diet.  Has  history of  DM. On diet , exercise and oral treatment. Blood sugars are controlled. Checking 1 time daily. No paresthesias. No hypoglycemias.  Has H/O hyperlipidemia. On medical treatment and diet. No side effects. No muscle weakness, myalgias or upset stomach.   Has h/o CRF. Monitoring BP, BG, medications, avoiding OTC NSAIDs. Needs periodic recheck of kidney function.           Advance Care Planning    Document on file is a Health Care Directive or POLST.        5/12/2025   General Health   How would you rate your overall physical health? Good   Feel stress (tense, anxious, or unable to sleep) Not at all         5/12/2025   Nutrition   Diet: I don't know         5/12/2025   Exercise   Days per week of moderate/strenous exercise 3 days         5/12/2025   Social Factors   Frequency of gathering with friends or relatives Twice a week   Worry food won't last until get money to buy more No   Food not last or not have enough money for food? No   Do you have housing? (Housing is defined as stable permanent housing and does not include staying outside in a car, in a tent, in an abandoned building, in an overnight shelter, or couch-surfing.) Yes   Are you worried about losing your housing? No   Lack of transportation? No   Unable to get utilities (heat,electricity)? No         5/12/2025   Fall Risk   Fallen 2 or more times in the past year? Yes   Trouble with walking or balance? No    Gait Speed Test (Document in seconds) 3.32   Gait Speed Test Interpretation Less than or equal to 5.00 seconds - PASS          2025   Activities of Daily Living- Home Safety   Needs help with the following daily activites None of the above   Safety concerns in the home None of the above         2025   Dental   Dentist two times every year? Yes         2025   Hearing Screening   Hearing concerns? None of the above         2025   Driving Risk Screening   Patient/family members have concerns about driving No         2025   General Alertness/Fatigue Screening   Have you been more tired than usual lately? No         2025   Urinary Incontinence Screening   Bothered by leaking urine in past 6 months No         Today's PHQ-2 Score:       2025     2:24 PM   PHQ-2 (  [employer])   Q1: Little interest or pleasure in doing things 0   Q2: Feeling down, depressed or hopeless 0   PHQ-2 Score 0    Q1: Little interest or pleasure in doing things Not at all   Q2: Feeling down, depressed or hopeless Not at all   PHQ-2 Score 0       Patient-reported           2025   Substance Use   Alcohol more than 3/day or more than 7/wk No   Do you have a current opioid prescription? No   How severe/bad is pain from 1 to 10? 0/10 (No Pain)   Do you use any other substances recreationally? No     Social History     Tobacco Use    Smoking status: Former     Current packs/day: 0.00     Types: Cigarettes     Quit date: 1983     Years since quittin.0    Smokeless tobacco: Never   Vaping Use    Vaping status: Never Used   Substance Use Topics    Alcohol use: Yes     Comment: once every couple monthly    Drug use: No       ASCVD Risk   The 10-year ASCVD risk score (Nidhi JASMINE, et al., 2019) is: 75.4%    Values used to calculate the score:      Age: 79 years      Sex: Male      Is Non- : No      Diabetic: Yes      Tobacco smoker: No      Systolic Blood Pressure: 168 mmHg      " Is BP treated: Yes      HDL Cholesterol: 38 mg/dL      Total Cholesterol: 158 mg/dL            Reviewed and updated as needed this visit by Provider                    Lab work is in process  Labs reviewed in EPIC  Current providers sharing in care for this patient include:  Patient Care Team:  Brennan Mercado MD as PCP - General (Internal Medicine)  Brennan Mercado MD as Assigned PCP  Alessandro Ybarra APRN CNP as Assigned Sleep Provider    The following health maintenance items are reviewed in Epic and correct as of today:  Health Maintenance   Topic Date Due    DIABETIC FOOT EXAM  Never done    EYE EXAM  Never done    BMP  05/08/2025    LIPID  05/08/2025    MICROALBUMIN  05/08/2025    ANNUAL REVIEW OF HM ORDERS  05/08/2025    MEDICARE ANNUAL WELLNESS VISIT  05/08/2025    URIC ACID  05/08/2025    COVID-19 Vaccine (7 - 2024-25 season) 05/25/2025    A1C  08/12/2025    FALL RISK ASSESSMENT  05/12/2026    HEMOGLOBIN  05/12/2026    ADVANCE CARE PLANNING  05/08/2029    DTAP/TDAP/TD IMMUNIZATION (3 - Td or Tdap) 12/13/2032    HEPATITIS C SCREENING  Completed    PHQ-2 (once per calendar year)  Completed    INFLUENZA VACCINE  Completed    Pneumococcal Vaccine: 50+ Years  Completed    URINALYSIS  Completed    ZOSTER IMMUNIZATION  Completed    RSV VACCINE  Completed    HPV IMMUNIZATION  Aged Out    MENINGITIS IMMUNIZATION  Aged Out    COLORECTAL CANCER SCREENING  Discontinued         Review of Systems  Constitutional, HEENT, cardiovascular, pulmonary, GI, , musculoskeletal, neuro, skin, endocrine and psych systems are negative, except as otherwise noted.     Objective    Exam  BP (!) 168/114 (BP Location: Left arm, Cuff Size: Adult Regular)   Pulse 91   Temp 98  F (36.7  C) (Tympanic)   Resp 18   Ht 1.626 m (5' 4\")   Wt 99 kg (218 lb 4.8 oz)   SpO2 97%   BMI 37.47 kg/m     Estimated body mass index is 37.47 kg/m  as calculated from the following:    Height as of this encounter: 1.626 m (5' 4\").    Weight as " of this encounter: 99 kg (218 lb 4.8 oz).    Physical Exam  GENERAL: alert and no distress, obese   EYES: Eyes grossly normal to inspection, PERRL and conjunctivae and sclerae normal  HENT: ear canals and TM's normal, nose and mouth without ulcers or lesions  NECK: no adenopathy, no asymmetry, masses, or scars  RESP: lungs clear to auscultation - no rales, rhonchi or wheezes  CV: regular rate and rhythm, normal S1 S2, no S3 or S4, no murmur, click or rub  ABDOMEN: soft, nontender, no hepatosplenomegaly, no masses and bowel sounds normal  MS: no gross musculoskeletal defects noted, trace LE edema  SKIN: no suspicious lesions or rashes  NEURO: Normal strength and tone, mentation intact and speech normal  PSYCH: mentation appears normal, affect normal/bright         5/12/2025   Mini Cog   Clock Draw Score 2 Normal   3 Item Recall 2 objects recalled   Mini Cog Total Score 4              Signed Electronically by: Brennan Mercado MD

## 2025-05-13 ENCOUNTER — RESULTS FOLLOW-UP (OUTPATIENT)
Dept: INTERNAL MEDICINE | Facility: CLINIC | Age: 80
End: 2025-05-13

## 2025-05-13 LAB
CREAT UR-MCNC: 113.7 MG/DL
MICROALBUMIN UR-MCNC: <12 MG/L
MICROALBUMIN/CREAT UR: NORMAL MG/G{CREAT}

## 2025-05-13 NOTE — LETTER
May 14, 2025      Rodney Hendrickson  [id number] [facility] [id number]        Dear ,    We are writing to inform you of your test results.    Mild anemia and decreased kidney function, but stable.   Diabetic control is slightly worsened. Try to improve diet and exercise. Recheck in 6 months.     Resulted Orders   Lipid panel reflex to direct LDL Fasting   Result Value Ref Range    Cholesterol 177 <200 mg/dL    Triglycerides 245 (H) <150 mg/dL    Direct Measure HDL 36 (L) >=40 mg/dL    LDL Cholesterol Calculated 92 <100 mg/dL    Non HDL Cholesterol 141 (H) <130 mg/dL    Patient Fasting > 8hrs? Yes     Narrative    Cholesterol  Desirable: < 200 mg/dL  Borderline High: 200 - 239 mg/dL  High: >= 240 mg/dL    Triglycerides  Normal: < 150 mg/dL  Borderline High: 150 - 199 mg/dL  High: 200-499 mg/dL  Very High: >= 500 mg/dL    Direct Measure HDL  Female: >= 50 mg/dL   Male: >= 40 mg/dL    LDL Cholesterol  Desirable: < 100 mg/dL  Above Desirable: 100 - 129 mg/dL   Borderline High: 130 - 159 mg/dL   High:  160 - 189 mg/dL   Very High: >= 190 mg/dL    Non HDL Cholesterol  Desirable: < 130 mg/dL  Above Desirable: 130 - 159 mg/dL  Borderline High: 160 - 189 mg/dL  High: 190 - 219 mg/dL  Very High: >= 220 mg/dL   CBC with platelets   Result Value Ref Range    WBC Count 5.9 4.0 - 11.0 10e3/uL    RBC Count 4.20 (L) 4.40 - 5.90 10e6/uL    Hemoglobin 13.6 13.3 - 17.7 g/dL    Hematocrit 38.9 (L) 40.0 - 53.0 %    MCV 93 78 - 100 fL    MCH 32.4 26.5 - 33.0 pg    MCHC 35.0 31.5 - 36.5 g/dL    RDW 12.4 10.0 - 15.0 %    Platelet Count 218 150 - 450 10e3/uL   Comprehensive metabolic panel (BMP + Alb, Alk Phos, ALT, AST, Total. Bili, TP)   Result Value Ref Range    Sodium 139 135 - 145 mmol/L    Potassium 4.0 3.4 - 5.3 mmol/L    Carbon Dioxide (CO2) 26 22 - 29 mmol/L    Anion Gap 12 7 - 15 mmol/L    Urea Nitrogen 32.8 (H) 8.0 - 23.0 mg/dL    Creatinine 1.63 (H) 0.67 - 1.17 mg/dL    GFR Estimate 43 (L) >60 mL/min/1.73m2       Comment:      eGFR calculated using 2021 CKD-EPI equation.    Calcium 9.7 8.8 - 10.4 mg/dL    Chloride 101 98 - 107 mmol/L    Glucose 202 (H) 70 - 99 mg/dL    Alkaline Phosphatase 77 40 - 150 U/L    AST 30 0 - 45 U/L    ALT 36 0 - 70 U/L    Protein Total 7.3 6.4 - 8.3 g/dL    Albumin 4.3 3.5 - 5.2 g/dL    Bilirubin Total 0.4 <=1.2 mg/dL    Patient Fasting > 8hrs? Yes    TSH with free T4 reflex   Result Value Ref Range    TSH 4.80 (H) 0.30 - 4.20 uIU/mL   PSA, screen   Result Value Ref Range    Prostate Specific Antigen Screen 0.58 0.00 - 6.50 ng/mL    Narrative    This result is obtained using the Roche Elecsys total PSA method on the albert e801 immunoassay analyzer, which is an ultrasensitive method. Results obtained with different assay methods or kits cannot be used interchangeably.  This test is intended for initial prostate cancer screening. PSA values exceeding the age-specific limits are suspicious for prostate disease, but additional testing, such as prostate biopsy, is needed to diagnose prostate pathology. The American Cancer Society recommends annual examination with digital rectal examination and serum PSA beginning at age 50 and for men with a life expectancy of at least 10 years after detection of prostate cancer. For men in high-risk groups, such as  Americans or men with a first-degree relative diagnosed at a younger age, testing should begin at a younger age. It is generally recommended that information be provided to patients about the benefits and limitations of testing and treatment so they can make informed decisions.   UA with Microscopic reflex to Culture - lab collect   Result Value Ref Range    Color Urine Yellow Colorless, Straw, Light Yellow, Yellow    Appearance Urine Clear Clear    Glucose Urine Negative Negative mg/dL    Bilirubin Urine Negative Negative    Ketones Urine Negative Negative mg/dL    Specific Gravity Urine 1.025 1.003 - 1.035    Blood Urine Negative Negative    pH  Urine 6.5 5.0 - 7.0    Protein Albumin Urine Negative Negative mg/dL    Urobilinogen Urine 0.2 0.2, 1.0 E.U./dL    Nitrite Urine Negative Negative    Leukocyte Esterase Urine Negative Negative   Albumin Random Urine Quantitative with Creat Ratio   Result Value Ref Range    Creatinine Urine mg/dL 113.7 mg/dL      Comment:      The reference ranges have not been established in urine creatinine. The results should be integrated into the clinical context for interpretation.    Albumin Urine mg/L <12.0 mg/L      Comment:      The reference ranges have not been established in urine albumin. The results should be integrated into the clinical context for interpretation.    Albumin Urine mg/g Cr        Comment:      Unable to calculate, urine albumin and/or urine creatinine is outside detectable limits.  Microalbuminuria is defined as an albumin:creatinine ratio of 17 to 299 for males and 25 to 299 for females. A ratio of albumin:creatinine of 300 or higher is indicative of overt proteinuria.  Due to biologic variability, positive results should be confirmed by a second, first-morning random or 24-hour timed urine specimen. If there is discrepancy, a third specimen is recommended. When 2 out of 3 results are in the microalbuminuria range, this is evidence for incipient nephropathy and warrants increased efforts at glucose control, blood pressure control, and institution of therapy with an angiotensin-converting-enzyme (ACE) inhibitor (if the patient can tolerate it).     Hemoglobin A1c   Result Value Ref Range    Estimated Average Glucose 189 (H) <117 mg/dL    Hemoglobin A1C 8.2 (H) 0.0 - 5.6 %      Comment:      Normal <5.7%   Prediabetes 5.7-6.4%    Diabetes 6.5% or higher     Note: Adopted from ADA consensus guidelines.    Narrative    Results confirmed by repeat test.     Uric acid   Result Value Ref Range    Uric Acid 7.1 (H) 3.4 - 7.0 mg/dL   UA Microscopic with Reflex to Culture   Result Value Ref Range    RBC Urine  0-2 0-2 /HPF /HPF    WBC Urine 0-5 0-5 /HPF /HPF    Narrative    Urine Culture not indicated   T4 free   Result Value Ref Range    Free T4 1.33 0.90 - 1.70 ng/dL       If you have any questions or concerns, please call the clinic at the number listed above.       Sincerely,      Brennan Mercado MD    Electronically signed

## 2025-05-19 ENCOUNTER — TELEPHONE (OUTPATIENT)
Dept: INTERNAL MEDICINE | Facility: CLINIC | Age: 80
End: 2025-05-19
Payer: COMMERCIAL

## 2025-05-19 NOTE — TELEPHONE ENCOUNTER
Test Results    Contacts       Contact Date/Time Type Contact Phone/Fax    05/19/2025 10:17 AM CDT Phone (Incoming) Rodney Hendrickson (Self) [phone] (H)     results            Who ordered the test:  Dr. Mercado    Type of test: Lab    Date of test:  5/12/25    Where was the test performed:  [facility]    What are your questions/concerns?:  Patient requesting advice regarding his recent lab results.    Okay to leave a detailed message?: Yes at Home number on file [phone] ([place])

## 2025-05-19 NOTE — TELEPHONE ENCOUNTER
"Per result note message 5/12/25 from primary care provider:    \"Mild anemia and decreased kidney function , stable.   Diabetic control is slightly worsened. Try to improve diet and exercise. Recheck in 6 months.\"     A letter was mailed to patient 5/14/25 with this info.    Patient informed of primary care provider's result note message.  "

## 2025-05-29 ENCOUNTER — ALLIED HEALTH/NURSE VISIT (OUTPATIENT)
Dept: INTERNAL MEDICINE | Facility: CLINIC | Age: 80
End: 2025-05-29
Payer: COMMERCIAL

## 2025-05-29 VITALS — DIASTOLIC BLOOD PRESSURE: 83 MMHG | SYSTOLIC BLOOD PRESSURE: 145 MMHG

## 2025-05-29 DIAGNOSIS — Z01.30 BLOOD PRESSURE CHECK: Primary | ICD-10-CM

## 2025-05-29 NOTE — PROGRESS NOTES
Rodney Hendrickson was evaluated at [facility] on May 29, 2025 at which time his blood pressure was:    BP Readings from Last 1 Encounters:   05/29/25 (!) 145/83     No data recorded      Reviewed lifestyle modifications for blood pressure control and reduction: including making healthy food choices, managing weight, getting regular exercise, smoking cessation, reducing alcohol consumption, monitoring blood pressure regularly.     Symptoms: None    BP Goal:< 140/90 mmHg    BP Assessment:  BP too high    Potential Reasons for BP too high: NA - Not applicable    BP Follow-Up Plan: Recheck BP in 30 days in the pharmacy.Date of next scheduled BP visit or call:      Recommendation to Provider:     Note completed by: Lester Hickey RPH

## 2025-06-02 ENCOUNTER — ALLIED HEALTH/NURSE VISIT (OUTPATIENT)
Dept: INTERNAL MEDICINE | Facility: CLINIC | Age: 80
End: 2025-06-02
Payer: COMMERCIAL

## 2025-06-02 VITALS — SYSTOLIC BLOOD PRESSURE: 164 MMHG | DIASTOLIC BLOOD PRESSURE: 83 MMHG

## 2025-06-02 DIAGNOSIS — Z01.30 BP CHECK: Primary | ICD-10-CM

## 2025-06-02 PROCEDURE — 3077F SYST BP >= 140 MM HG: CPT | Performed by: INTERNAL MEDICINE

## 2025-06-02 PROCEDURE — 99207 PR NO CHARGE NURSE ONLY: CPT | Performed by: INTERNAL MEDICINE

## 2025-06-02 PROCEDURE — 3079F DIAST BP 80-89 MM HG: CPT | Performed by: INTERNAL MEDICINE

## 2025-06-02 NOTE — PROGRESS NOTES
Rodney Hendrickson was evaluated at [facility] on June 2, 2025 at which time his blood pressure was:    BP Readings from Last 1 Encounters:   06/02/25 (!) 164/83     No data recorded      Reviewed lifestyle modifications for blood pressure control and reduction: including making healthy food choices, managing weight, getting regular exercise, smoking cessation, reducing alcohol consumption, monitoring blood pressure regularly.     Symptoms: None    BP Goal:Other: 140/90    BP Assessment:  BP too high    Potential Reasons for BP too high: Unknown/Other:      BP Follow-Up Plan: Referral to PCP    Recommendation to Provider: Mr. Hendrickson has been in several times for BP checks.  His readings are trending higher than 140/90.  Possibly needs dosing adjustments to BP meds.   Told the patient we would send the readings to you and encouraged him to stop back again for further evaluation.    Note completed by: Pablo Mayorga [facility]

## 2025-06-03 ENCOUNTER — TELEPHONE (OUTPATIENT)
Dept: INTERNAL MEDICINE | Facility: CLINIC | Age: 80
End: 2025-06-03
Payer: COMMERCIAL

## 2025-06-03 DIAGNOSIS — I10 PRIMARY HYPERTENSION: Primary | ICD-10-CM

## 2025-06-03 RX ORDER — AMLODIPINE BESYLATE 2.5 MG/1
2.5 TABLET ORAL DAILY
Qty: 30 TABLET | Refills: 3 | Status: SHIPPED | OUTPATIENT
Start: 2025-06-03

## 2025-06-03 NOTE — TELEPHONE ENCOUNTER
Advised patient of provider recommendation via telephone. He will  medication and likely start today. Writer advised to continue to monitor BP    Summer RN 11:25 AM Adriana 3, 2025   [facility]

## 2025-06-03 NOTE — TELEPHONE ENCOUNTER
Pt states his BP is still running high. He went to the pharmacy yesterday and on 5/29/25.     He has been taking the increase of the Hyzaar. Saw Dr Mercado on 5/12/25.     Pt denies any symptoms.     BP Readings from Last 3 Encounters:   06/02/25 (!) 164/83   05/29/25 (!) 145/83   05/12/25 (!) 160/84      Please advise.

## 2025-06-11 ENCOUNTER — OFFICE VISIT (OUTPATIENT)
Dept: SLEEP MEDICINE | Facility: CLINIC | Age: 80
End: 2025-06-11
Payer: COMMERCIAL

## 2025-06-11 VITALS
BODY MASS INDEX: 38.98 KG/M2 | HEIGHT: 64 IN | WEIGHT: 228.3 LBS | HEART RATE: 80 BPM | OXYGEN SATURATION: 95 % | DIASTOLIC BLOOD PRESSURE: 76 MMHG | SYSTOLIC BLOOD PRESSURE: 168 MMHG

## 2025-06-11 DIAGNOSIS — I10 PRIMARY HYPERTENSION: ICD-10-CM

## 2025-06-11 DIAGNOSIS — G47.33 OSA (OBSTRUCTIVE SLEEP APNEA): Primary | ICD-10-CM

## 2025-06-11 PROCEDURE — 3078F DIAST BP <80 MM HG: CPT | Performed by: PHYSICIAN ASSISTANT

## 2025-06-11 PROCEDURE — 3077F SYST BP >= 140 MM HG: CPT | Performed by: PHYSICIAN ASSISTANT

## 2025-06-11 PROCEDURE — 1126F AMNT PAIN NOTED NONE PRSNT: CPT | Performed by: PHYSICIAN ASSISTANT

## 2025-06-11 PROCEDURE — 99213 OFFICE O/P EST LOW 20 MIN: CPT | Performed by: PHYSICIAN ASSISTANT

## 2025-06-11 ASSESSMENT — SLEEP AND FATIGUE QUESTIONNAIRES
HOW LIKELY ARE YOU TO NOD OFF OR FALL ASLEEP WHILE SITTING INACTIVE IN A PUBLIC PLACE: SLIGHT CHANCE OF DOZING
HOW LIKELY ARE YOU TO NOD OFF OR FALL ASLEEP WHILE LYING DOWN TO REST IN THE AFTERNOON WHEN CIRCUMSTANCES PERMIT: SLIGHT CHANCE OF DOZING
HOW LIKELY ARE YOU TO NOD OFF OR FALL ASLEEP IN A CAR, WHILE STOPPED FOR A FEW MINUTES IN TRAFFIC: MODERATE CHANCE OF DOZING
HOW LIKELY ARE YOU TO NOD OFF OR FALL ASLEEP WHILE SITTING AND READING: SLIGHT CHANCE OF DOZING
HOW LIKELY ARE YOU TO NOD OFF OR FALL ASLEEP WHEN YOU ARE A PASSENGER IN A CAR FOR AN HOUR WITHOUT A BREAK: MODERATE CHANCE OF DOZING
HOW LIKELY ARE YOU TO NOD OFF OR FALL ASLEEP WHILE SITTING QUIETLY AFTER LUNCH WITHOUT ALCOHOL: SLIGHT CHANCE OF DOZING
HOW LIKELY ARE YOU TO NOD OFF OR FALL ASLEEP WHILE SITTING AND TALKING TO SOMEONE: WOULD NEVER DOZE
HOW LIKELY ARE YOU TO NOD OFF OR FALL ASLEEP WHILE WATCHING TV: SLIGHT CHANCE OF DOZING

## 2025-06-11 NOTE — PROGRESS NOTES
"[facility]   Outpatient Sleep Medicine  Jun 11, 2025       Name: Rodney Hendrickson MRN# [id number]   Age: 79 year old YOB: 1945            Assessment and Plan:   1. DOMINIK (obstructive sleep apnea) (Primary)  Patient's sleep apnea is adequately managed and well treated with current PAP settings [id number] with low residual AHI of 1.8 events per hour. Compliance is good. Tolerating PAP well. Daytime symptoms and nighttime sleep quality are improved with use of CPAP. Prescription renewed for mask/supplies.    - Comprehensive DME    2. Primary hypertension  Blood pressure elevated x2 today, reports recently been changing meds to work on control, will continue to follow PCP.     Rodney Hendrickson will follow up in about 1 year for annual visit or sooner prn.        Chief Complaint      Chief Complaint   Patient presents with    CPAP Follow Up     DOMINIK          History of Present Illness:     Rodney eHndrickson is a 79 year old male with morbid obesity, HTN, HLD, BPH, T2DM, who presents to the clinic for follow-up of their moderate obstructive sleep apnea treated with CPAP.     Previous Study Results: [facility]  Date: 4/12/2017.  Weight 197 pounds.  AHI: 25.5/hr. Supine AHI: 65.8/hr. Non-Supine AHI: 18.2/hr. O2 gaye 71.0%.  Time Spent oxygen saturation below 88% was 16.1 minutes.      Set up with his current Resmed Airsense 10 machine on 6/30/2023 through [facility].     Last seen 6/2024 by Alessandro Ramos CNP and presents today for annual PAP follow-up visit. No concerns today.     Overall, the patient rates their experience with PAP as 6/10. Patient is using a nasal pillow mask.  Mask is comfortable and denies significant leaking.  Does have some dry mouth but tolerable, didn't tolerate chin strap in past. Didn't tolerate Biotene lozenges either. Pressure setting is comfortable.  No residual snoring with mask on but does wake gasping for air \"maybe once every 6 months not often\".     Bedtime is typically " "10:00 PM. Usually it takes about 10 minutes to fall asleep with the mask on. Wake time is typically 6:00 AM.  Patient estimates they are using PAP therapy 8 hours per night. The patient estimates they are usually getting 8 hours of sleep per night.    ResMed AirSense 10 Auto-PAP 8-16 cmH2O download (5/11/25-6/9/25):  30 total days of use. 0 nonuse days. 25 days with >4 hours use.  Average use 6 hours 18 minutes per day. Median Leak 6.2 L/min. 95%ile Leak 29.1 L/min. Median pressure 9.5cm. CPAP 95% pressure 12.3cm. AHI 1.8    SCALES:   INSOMNIA: Insomnia Severity Score: 9   SLEEPINESS: [place] Sleepiness Score: 9    Past medical/surgical history, family history, social history, medications and allergies were reviewed.           Physical Examination:   BP (!) 169/84   Pulse 80   Ht 1.626 m (5' 4\")   Wt 103.6 kg (228 lb 4.8 oz)   SpO2 95%   BMI 39.19 kg/m    General appearance: Awake, alert, cooperative. Well groomed. Sitting comfortably in chair. In no apparent distress.  HEENT: Head: Normocephalic, atraumatic. Eyes:Conjunctiva clear. Sclera normal. Nose: External appearance without deformity.   Pulmonary:  Able to speak easily in full sentences. No cough or wheeze.   Skin:  No rashes or significant lesions on visible skin.   Neurologic: Alert, oriented x3.   Psychiatric: Mood euthymic. Affect congruent with full range and intensity.      CC:  Brennan Mercado PA-C  Jun 11, 2025     [facility]  [address] [place] [address]     [facility]  [address] [place] [id number]    Chart documentation was completed, in part, with [employer] voice-recognition software. Even though reviewed, some grammatical, spelling, and word errors may remain.    20 minutes spent on day of encounter doing chart review, history and exam, documentation, and further activities as noted above    "

## 2025-06-11 NOTE — PROGRESS NOTES
"Chief Complaint   Patient presents with    CPAP Follow Up     DOMINIK       Initial BP (!) 169/84   Pulse 80   Ht 1.626 m (5' 4\")   Wt 103.6 kg (228 lb 4.8 oz)   SpO2 95%   BMI 39.19 kg/m   Estimated body mass index is 39.19 kg/m  as calculated from the following:    Height as of this encounter: 1.626 m (5' 4\").    Weight as of this encounter: 103.6 kg (228 lb 4.8 oz).    Medication Reconciliation: complete  ESS  9  KRISTIE 9    DME: MARCY Okeefe MA      "

## 2025-06-17 ENCOUNTER — ALLIED HEALTH/NURSE VISIT (OUTPATIENT)
Dept: INTERNAL MEDICINE | Facility: CLINIC | Age: 80
End: 2025-06-17
Payer: COMMERCIAL

## 2025-06-17 ENCOUNTER — TELEPHONE (OUTPATIENT)
Dept: INTERNAL MEDICINE | Facility: CLINIC | Age: 80
End: 2025-06-17
Payer: COMMERCIAL

## 2025-06-17 VITALS — SYSTOLIC BLOOD PRESSURE: 159 MMHG | DIASTOLIC BLOOD PRESSURE: 85 MMHG

## 2025-06-17 DIAGNOSIS — I10 PRIMARY HYPERTENSION: ICD-10-CM

## 2025-06-17 DIAGNOSIS — Z01.30 BP CHECK: Primary | ICD-10-CM

## 2025-06-17 PROCEDURE — 3077F SYST BP >= 140 MM HG: CPT | Performed by: INTERNAL MEDICINE

## 2025-06-17 PROCEDURE — 3079F DIAST BP 80-89 MM HG: CPT | Performed by: INTERNAL MEDICINE

## 2025-06-17 PROCEDURE — 99207 PR NO CHARGE NURSE ONLY: CPT | Performed by: INTERNAL MEDICINE

## 2025-06-17 RX ORDER — AMLODIPINE BESYLATE 5 MG/1
5 TABLET ORAL DAILY
Qty: 90 TABLET | Refills: 3 | Status: SHIPPED | OUTPATIENT
Start: 2025-06-17

## 2025-06-17 NOTE — TELEPHONE ENCOUNTER
Patient calls in to see if the message was received from  Pharmacy.     Please see message from  Pharmacy dated 6/17/2025 and advise if medication adjustment is recommended.

## 2025-06-17 NOTE — PROGRESS NOTES
Rodney Hendrickson was evaluated at [facility] on June 17, 2025 at which time his blood pressure was:    BP Readings from Last 1 Encounters:   06/17/25 (!) 159/85     No data recorded      Reviewed lifestyle modifications for blood pressure control and reduction: including making healthy food choices, managing weight, getting regular exercise, smoking cessation, reducing alcohol consumption, monitoring blood pressure regularly.     Symptoms: None    BP Goal:< 140/90 mmHg    BP Assessment:  BP too high    Potential Reasons for BP too high: Unknown/Other:      BP Follow-Up Plan: Referral to PCP    Recommendation to Provider: Mr. Hendrickson has been coming in for BP checks consistently over the last several weeks.  He says his BP meds had been adjusted several weeks ago but his numbers continue to be above goal both here and at other providers recent checks.  I encouraged him to touch base with Dr Mercado and to continue to stop by for regular BP checks here.    Note completed by: Pablo Mayorga [facility]

## 2025-06-17 NOTE — PROGRESS NOTES
Patient calls in to see if this message was received.    Please see message from [facility] and advise if medication adjustment is recommended.

## 2025-06-27 ENCOUNTER — ALLIED HEALTH/NURSE VISIT (OUTPATIENT)
Dept: INTERNAL MEDICINE | Facility: CLINIC | Age: 80
End: 2025-06-27
Payer: COMMERCIAL

## 2025-06-27 VITALS — SYSTOLIC BLOOD PRESSURE: 145 MMHG | DIASTOLIC BLOOD PRESSURE: 85 MMHG

## 2025-06-27 DIAGNOSIS — Z01.30 BP CHECK: Primary | ICD-10-CM

## 2025-06-27 PROCEDURE — 3079F DIAST BP 80-89 MM HG: CPT | Performed by: INTERNAL MEDICINE

## 2025-06-27 PROCEDURE — 3077F SYST BP >= 140 MM HG: CPT | Performed by: INTERNAL MEDICINE

## 2025-06-27 PROCEDURE — 99207 PR NO CHARGE NURSE ONLY: CPT | Performed by: INTERNAL MEDICINE

## 2025-06-27 NOTE — PROGRESS NOTES
Rodney Hendrickson was evaluated at [facility] on June 27, 2025 at which time his blood pressure was:    BP Readings from Last 1 Encounters:   06/27/25 (!) 145/85     No data recorded      Reviewed lifestyle modifications for blood pressure control and reduction: including making healthy food choices, managing weight, getting regular exercise, smoking cessation, reducing alcohol consumption, monitoring blood pressure regularly.     Symptoms: None    BP Goal:Other: <140/90    BP Assessment:  BP too high    Potential Reasons for BP too high: Unknown/Other:      BP Follow-Up Plan: Recheck BP in 30 days in the pharmacy.Date of next scheduled BP visit or call: Patient recently had a dose change with his BP medications, urged to stop back weekly and evaluate BP in the pharmacy    Recommendation to Provider: Potentially will need further dosing adjustments, We will continue to forward BP readings.    Note completed by: Pablo Mayorga RPH

## 2025-07-11 ENCOUNTER — ALLIED HEALTH/NURSE VISIT (OUTPATIENT)
Dept: INTERNAL MEDICINE | Facility: CLINIC | Age: 80
End: 2025-07-11
Payer: COMMERCIAL

## 2025-07-11 VITALS — SYSTOLIC BLOOD PRESSURE: 159 MMHG | DIASTOLIC BLOOD PRESSURE: 88 MMHG

## 2025-07-11 DIAGNOSIS — Z01.30 BP CHECK: Primary | ICD-10-CM

## 2025-07-11 PROCEDURE — 3079F DIAST BP 80-89 MM HG: CPT | Performed by: INTERNAL MEDICINE

## 2025-07-11 PROCEDURE — 99207 PR NO CHARGE NURSE ONLY: CPT | Performed by: INTERNAL MEDICINE

## 2025-07-11 PROCEDURE — 3077F SYST BP >= 140 MM HG: CPT | Performed by: INTERNAL MEDICINE

## 2025-07-11 NOTE — PROGRESS NOTES
Patient calls in to see if there should be a change to his BP medication based on his nurse visit at the  Pharmacy.  Please see Health Nurse Notes and advise.    Rogelio is out of the office so sending this to covering providers

## 2025-07-11 NOTE — PROGRESS NOTES
Rodney Hendrickson was evaluated at [facility] on July 11, 2025 at which time his blood pressure was:    BP Readings from Last 1 Encounters:   07/11/25 (!) 159/88     No data recorded      Reviewed lifestyle modifications for blood pressure control and reduction: including making healthy food choices, managing weight, getting regular exercise, smoking cessation, reducing alcohol consumption, monitoring blood pressure regularly.     Symptoms: None    BP Goal:Other: <140/90    BP Assessment:  BP too high    Potential Reasons for BP too high: Unknown/Other: BP medications dosing adjustments    BP Follow-Up Plan: Recheck BP in 30 days in the pharmacy.Date of next scheduled BP visit or call: patient will return next week for follow-up BP check    Recommendation to Provider: Continue to monitor BP and make dosing adjustments to BP medications as needed    Note completed by: Pablo Mayorga RPH

## 2025-07-14 NOTE — PROGRESS NOTES
Patient calls again regarding elevated BP reading in pharmacy .  He takes Amlodipine 5 mg daily and Losartan-hydrochlorothiazide 100-25 mg once a day and has not missed any doses.  He denies headache, dizziness, vision changes.  He just wants to know if he should change BP medications or doses.  Call him at home ajay Can R.N.    BP Readings from Last 6 Encounters:   07/11/25 (!) 159/88   06/27/25 (!) 145/85   06/17/25 (!) 159/85   06/11/25 (!) 168/76   06/02/25 (!) 164/83   05/29/25 (!) 145/83

## 2025-07-14 NOTE — PROGRESS NOTES
Call to patient to relay Dr. Francois's message. Patient verbalizes understanding of instructions and indicates no further questions at this time.    Thank you,  Joaquin, Triage RN Wu Rapp   12:22 PM 7/14/2025

## 2025-07-17 ENCOUNTER — ALLIED HEALTH/NURSE VISIT (OUTPATIENT)
Dept: INTERNAL MEDICINE | Facility: CLINIC | Age: 80
End: 2025-07-17
Payer: COMMERCIAL

## 2025-07-17 VITALS — DIASTOLIC BLOOD PRESSURE: 77 MMHG | SYSTOLIC BLOOD PRESSURE: 161 MMHG

## 2025-07-17 DIAGNOSIS — Z01.30 BP CHECK: Primary | ICD-10-CM

## 2025-07-17 PROCEDURE — 99207 PR NO CHARGE NURSE ONLY: CPT | Performed by: INTERNAL MEDICINE

## 2025-07-17 PROCEDURE — 3077F SYST BP >= 140 MM HG: CPT | Performed by: INTERNAL MEDICINE

## 2025-07-17 PROCEDURE — 3078F DIAST BP <80 MM HG: CPT | Performed by: INTERNAL MEDICINE

## 2025-07-17 NOTE — PROGRESS NOTES
Rodney Hendrickson was evaluated at [facility] on July 17, 2025 at which time his blood pressure was:    BP Readings from Last 1 Encounters:   07/17/25 (!) 161/77     No data recorded      Reviewed lifestyle modifications for blood pressure control and reduction: including making healthy food choices, managing weight, getting regular exercise, smoking cessation, reducing alcohol consumption, monitoring blood pressure regularly.     Symptoms: None    BP Goal:< 140/90 mmHg    BP Assessment:  BP too high    Potential Reasons for BP too high: NA - Not applicable     BP Follow-Up Plan: Recheck BP in 30 days in the pharmacy.Date of next scheduled BP visit or call: [phone]. He is planning to come back next week.    Recommendation to Provider: Recheck BP next week. Amlodipine was increased July 14.      Note completed by: Marysol Molina RPH

## 2025-07-24 ENCOUNTER — ALLIED HEALTH/NURSE VISIT (OUTPATIENT)
Dept: INTERNAL MEDICINE | Facility: CLINIC | Age: 80
End: 2025-07-24
Payer: COMMERCIAL

## 2025-07-24 VITALS — DIASTOLIC BLOOD PRESSURE: 77 MMHG | SYSTOLIC BLOOD PRESSURE: 137 MMHG

## 2025-07-24 DIAGNOSIS — Z01.30 BP CHECK: Primary | ICD-10-CM

## 2025-07-24 NOTE — PROGRESS NOTES
Rodney Hendrickson was evaluated at [facility] on July 24, 2025 at which time his blood pressure was:    BP Readings from Last 1 Encounters:   07/24/25 137/77     No data recorded      Reviewed lifestyle modifications for blood pressure control and reduction: including making healthy food choices, managing weight, getting regular exercise, smoking cessation, reducing alcohol consumption, monitoring blood pressure regularly.     Symptoms: None    BP Goal:< 140/90 mmHg    BP Assessment:  BP at goal    Potential Reasons for BP too high: NA - Not applicable    BP Follow-Up Plan: Recheck BP in 30 days in the pharmacy.Date of next scheduled BP visit or call:      Recommendation to Provider: Ivone Qiu    Note completed by: Ivone Qiu RP

## 2025-07-29 ENCOUNTER — OFFICE VISIT (OUTPATIENT)
Dept: URGENT CARE | Facility: URGENT CARE | Age: 80
End: 2025-07-29
Payer: COMMERCIAL

## 2025-07-29 VITALS
RESPIRATION RATE: 16 BRPM | HEART RATE: 91 BPM | TEMPERATURE: 97 F | SYSTOLIC BLOOD PRESSURE: 159 MMHG | OXYGEN SATURATION: 98 % | DIASTOLIC BLOOD PRESSURE: 77 MMHG

## 2025-07-29 DIAGNOSIS — R21 RASH AND NONSPECIFIC SKIN ERUPTION: Primary | ICD-10-CM

## 2025-07-29 PROCEDURE — 99213 OFFICE O/P EST LOW 20 MIN: CPT | Performed by: FAMILY MEDICINE

## 2025-07-29 PROCEDURE — 3078F DIAST BP <80 MM HG: CPT | Performed by: FAMILY MEDICINE

## 2025-07-29 PROCEDURE — 3077F SYST BP >= 140 MM HG: CPT | Performed by: FAMILY MEDICINE

## 2025-07-29 RX ORDER — TRIAMCINOLONE ACETONIDE 1 MG/G
CREAM TOPICAL 2 TIMES DAILY
Qty: 30 G | Refills: 0 | Status: SHIPPED | OUTPATIENT
Start: 2025-07-29 | End: 2025-08-05

## 2025-07-29 NOTE — PROGRESS NOTES
Urgent Care Clinic Visit    Chief Complaint   Patient presents with    Urgent Care     - Today  - Rash   - Denies Itchy  - Bilateral Legs              7/29/2025     1:35 PM   Additional Questions   Roomed by Tello GUILLEN   Accompanied by Wife

## 2025-07-29 NOTE — PATIENT INSTRUCTIONS
If your rash gets itchy, you can fill the prescription for triamcinolone cream and use this topically once or twice a day for up to one week.    Try to keep your legs elevated when you're sitting for the next few days.

## 2025-07-29 NOTE — PROGRESS NOTES
"  ICD-10-CM    1. 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    PLAN:  Patient Instructions   If your rash gets itchy, you can fill the prescription for triamcinolone cream and use this topically once or twice a day for up to one week.    Try to keep your legs elevated when you're sitting for the next few days.    SUBJECTIVE:  Rodney Hendrickson is a 79 year old male who presents to  today with rash on both lower legs that he noticed today.  Was recently up in [place] and did a lot of walking.  Leaving for [place] and Friday and wants to make sure this rash isn't anything that needs treatment.  Currently rash is not itchy or painful.  No one else he was with has a similar rash.  Was near poison ivy but hasn't seen any blisters within this rash.  Has not applied anything topically.    OBJECTIVE:  BP (!) 159/77   Pulse 91   Temp 97  F (36.1  C) (Tympanic)   Resp 16   SpO2 98%   GEN: well-appearing, in NAD  Skin:  bilateral anterior and medial lower legs below mid shin with scattered areas of flat and slightly raised areas of erythema that do not chelle, no excoriations or other skin breakdown, no increased  warmth, no edema noted, no red streaks extending proximally  "

## 2025-08-06 DIAGNOSIS — E78.5 HYPERLIPIDEMIA LDL GOAL <130: ICD-10-CM

## 2025-08-06 DIAGNOSIS — E11.9 TYPE 2 DIABETES MELLITUS WITHOUT COMPLICATION, WITHOUT LONG-TERM CURRENT USE OF INSULIN (H): ICD-10-CM

## 2025-08-06 DIAGNOSIS — R35.0 BENIGN PROSTATIC HYPERPLASIA WITH URINARY FREQUENCY: ICD-10-CM

## 2025-08-06 DIAGNOSIS — K21.9 GASTROESOPHAGEAL REFLUX DISEASE WITHOUT ESOPHAGITIS: ICD-10-CM

## 2025-08-06 DIAGNOSIS — N40.1 BENIGN PROSTATIC HYPERPLASIA WITH URINARY FREQUENCY: ICD-10-CM

## 2025-08-06 DIAGNOSIS — M1A.0790 CHRONIC IDIOPATHIC GOUT INVOLVING TOE WITHOUT TOPHUS, UNSPECIFIED LATERALITY: ICD-10-CM

## 2025-08-06 DIAGNOSIS — N40.1 BENIGN NON-NODULAR PROSTATIC HYPERPLASIA WITH LOWER URINARY TRACT SYMPTOMS: ICD-10-CM

## 2025-08-06 RX ORDER — TAMSULOSIN HYDROCHLORIDE 0.4 MG/1
CAPSULE ORAL
Qty: 90 CAPSULE | Refills: 0 | Status: SHIPPED | OUTPATIENT
Start: 2025-08-06

## 2025-08-06 RX ORDER — OMEPRAZOLE 40 MG/1
CAPSULE, DELAYED RELEASE ORAL
Qty: 90 CAPSULE | Refills: 0 | Status: SHIPPED | OUTPATIENT
Start: 2025-08-06

## 2025-08-06 RX ORDER — FINASTERIDE 5 MG/1
1 TABLET, FILM COATED ORAL DAILY
Qty: 90 TABLET | Refills: 0 | Status: SHIPPED | OUTPATIENT
Start: 2025-08-06

## 2025-08-06 RX ORDER — ALLOPURINOL 100 MG/1
100 TABLET ORAL DAILY
Qty: 90 TABLET | Refills: 0 | Status: SHIPPED | OUTPATIENT
Start: 2025-08-06

## 2025-08-06 RX ORDER — ATORVASTATIN CALCIUM 40 MG/1
40 TABLET, FILM COATED ORAL DAILY
Qty: 90 TABLET | Refills: 0 | Status: SHIPPED | OUTPATIENT
Start: 2025-08-06

## 2025-08-07 ENCOUNTER — OFFICE VISIT (OUTPATIENT)
Dept: URGENT CARE | Facility: URGENT CARE | Age: 80
End: 2025-08-07
Payer: COMMERCIAL

## 2025-08-07 VITALS
OXYGEN SATURATION: 99 % | TEMPERATURE: 98.3 F | SYSTOLIC BLOOD PRESSURE: 118 MMHG | DIASTOLIC BLOOD PRESSURE: 76 MMHG | BODY MASS INDEX: 38.93 KG/M2 | HEART RATE: 75 BPM | RESPIRATION RATE: 14 BRPM | WEIGHT: 228 LBS | HEIGHT: 64 IN

## 2025-08-07 DIAGNOSIS — M10.9 ACUTE GOUT INVOLVING TOE OF RIGHT FOOT, UNSPECIFIED CAUSE: Primary | ICD-10-CM

## 2025-08-07 DIAGNOSIS — N18.32 STAGE 3B CHRONIC KIDNEY DISEASE (H): ICD-10-CM

## 2025-08-07 DIAGNOSIS — M1A.0790 CHRONIC IDIOPATHIC GOUT INVOLVING TOE WITHOUT TOPHUS, UNSPECIFIED LATERALITY: ICD-10-CM

## 2025-08-07 DIAGNOSIS — N18.30 TYPE 2 DIABETES MELLITUS WITH STAGE 3 CHRONIC KIDNEY DISEASE, WITHOUT LONG-TERM CURRENT USE OF INSULIN, UNSPECIFIED WHETHER STAGE 3A OR 3B CKD (H): ICD-10-CM

## 2025-08-07 DIAGNOSIS — E11.22 TYPE 2 DIABETES MELLITUS WITH STAGE 3 CHRONIC KIDNEY DISEASE, WITHOUT LONG-TERM CURRENT USE OF INSULIN, UNSPECIFIED WHETHER STAGE 3A OR 3B CKD (H): ICD-10-CM

## 2025-08-07 RX ORDER — COLCHICINE 0.6 MG/1
0.6 TABLET ORAL PRN
Qty: 5 TABLET | Refills: 0 | Status: SHIPPED | OUTPATIENT
Start: 2025-08-07

## 2025-08-07 RX ORDER — PREDNISONE 20 MG/1
TABLET ORAL
Qty: 20 TABLET | Refills: 0 | Status: SHIPPED | OUTPATIENT
Start: 2025-08-07 | End: 2025-08-19

## 2025-08-25 ENCOUNTER — TELEPHONE (OUTPATIENT)
Dept: INTERNAL MEDICINE | Facility: CLINIC | Age: 80
End: 2025-08-25
Payer: COMMERCIAL

## 2025-08-25 DIAGNOSIS — Z79.2 NEED FOR PROPHYLACTIC ANTIBIOTIC: Primary | ICD-10-CM

## 2025-08-25 RX ORDER — AMOXICILLIN 500 MG/1
2000 CAPSULE ORAL
Qty: 4 CAPSULE | Refills: 3 | Status: SHIPPED | OUTPATIENT
Start: 2025-08-25

## (undated) DEVICE — ENDO FORCEP ENDOJAW BIOPSY 2.8MMX160CM FB-220K

## (undated) DEVICE — ENDO FORCEP ENDOJAW BIOPSY 2.8MMX230CM FB-220U

## (undated) DEVICE — KIT ENDO TURNOVER/PROCEDURE CARRY-ON 101822

## (undated) DEVICE — KIT ENDO TURNOVER/PROCEDURE W/CLEAN A SCOPE LINERS 103888

## (undated) DEVICE — ENDO BITE BLOCK ADULT OLYMPUS LATEX FREE MAJ-1632

## (undated) RX ORDER — FENTANYL CITRATE 50 UG/ML
INJECTION, SOLUTION INTRAMUSCULAR; INTRAVENOUS
Status: DISPENSED
Start: 2018-02-13

## (undated) RX ORDER — FENTANYL CITRATE 50 UG/ML
INJECTION, SOLUTION INTRAMUSCULAR; INTRAVENOUS
Status: DISPENSED
Start: 2023-02-13

## (undated) RX ORDER — FENTANYL CITRATE 50 UG/ML
INJECTION, SOLUTION INTRAMUSCULAR; INTRAVENOUS
Status: DISPENSED
Start: 2017-01-18